# Patient Record
Sex: FEMALE | Race: OTHER | HISPANIC OR LATINO | URBAN - METROPOLITAN AREA
[De-identification: names, ages, dates, MRNs, and addresses within clinical notes are randomized per-mention and may not be internally consistent; named-entity substitution may affect disease eponyms.]

---

## 2017-04-04 ENCOUNTER — INPATIENT (INPATIENT)
Facility: HOSPITAL | Age: 57
LOS: 21 days | Discharge: HOME | End: 2017-04-26
Attending: PSYCHIATRY & NEUROLOGY | Admitting: PSYCHIATRY & NEUROLOGY
Payer: SELF-PAY

## 2017-04-04 VITALS
SYSTOLIC BLOOD PRESSURE: 149 MMHG | DIASTOLIC BLOOD PRESSURE: 104 MMHG | OXYGEN SATURATION: 100 % | HEART RATE: 101 BPM | RESPIRATION RATE: 16 BRPM | TEMPERATURE: 98 F

## 2017-04-04 DIAGNOSIS — F31.4 BIPOLAR DISORDER, CURRENT EPISODE DEPRESSED, SEVERE, WITHOUT PSYCHOTIC FEATURES: ICD-10-CM

## 2017-04-04 LAB
ALBUMIN SERPL ELPH-MCNC: 4.4 G/DL — SIGNIFICANT CHANGE UP (ref 3.3–5)
ALP SERPL-CCNC: 113 U/L — SIGNIFICANT CHANGE UP (ref 40–120)
ALT FLD-CCNC: 51 U/L — HIGH (ref 4–33)
APAP SERPL-MCNC: < 15 UG/ML — LOW (ref 15–25)
AST SERPL-CCNC: 57 U/L — HIGH (ref 4–32)
B-OH-BUTYR SERPL-SCNC: 0.2 MMOL/L — SIGNIFICANT CHANGE UP (ref 0–0.4)
BARBITURATES MEASUREMENT: NEGATIVE — SIGNIFICANT CHANGE UP
BASOPHILS # BLD AUTO: 0.03 K/UL — SIGNIFICANT CHANGE UP (ref 0–0.2)
BASOPHILS NFR BLD AUTO: 0.4 % — SIGNIFICANT CHANGE UP (ref 0–2)
BENZODIAZ SERPL-MCNC: NEGATIVE — SIGNIFICANT CHANGE UP
BILIRUB SERPL-MCNC: 0.7 MG/DL — SIGNIFICANT CHANGE UP (ref 0.2–1.2)
BUN SERPL-MCNC: 12 MG/DL — SIGNIFICANT CHANGE UP (ref 7–23)
CALCIUM SERPL-MCNC: 9.3 MG/DL — SIGNIFICANT CHANGE UP (ref 8.4–10.5)
CHLORIDE SERPL-SCNC: 95 MMOL/L — LOW (ref 98–107)
CO2 SERPL-SCNC: 25 MMOL/L — SIGNIFICANT CHANGE UP (ref 22–31)
CREAT SERPL-MCNC: 0.63 MG/DL — SIGNIFICANT CHANGE UP (ref 0.5–1.3)
EOSINOPHIL # BLD AUTO: 0.07 K/UL — SIGNIFICANT CHANGE UP (ref 0–0.5)
EOSINOPHIL NFR BLD AUTO: 1 % — SIGNIFICANT CHANGE UP (ref 0–6)
ETHANOL BLD-MCNC: < 10 MG/DL — SIGNIFICANT CHANGE UP
GLUCOSE SERPL-MCNC: 164 MG/DL — HIGH (ref 70–99)
HCT VFR BLD CALC: 40.8 % — SIGNIFICANT CHANGE UP (ref 34.5–45)
HGB BLD-MCNC: 13.4 G/DL — SIGNIFICANT CHANGE UP (ref 11.5–15.5)
IMM GRANULOCYTES NFR BLD AUTO: 0.3 % — SIGNIFICANT CHANGE UP (ref 0–1.5)
LYMPHOCYTES # BLD AUTO: 3.07 K/UL — SIGNIFICANT CHANGE UP (ref 1–3.3)
LYMPHOCYTES # BLD AUTO: 44.9 % — HIGH (ref 13–44)
MCHC RBC-ENTMCNC: 29.4 PG — SIGNIFICANT CHANGE UP (ref 27–34)
MCHC RBC-ENTMCNC: 32.8 % — SIGNIFICANT CHANGE UP (ref 32–36)
MCV RBC AUTO: 89.5 FL — SIGNIFICANT CHANGE UP (ref 80–100)
MONOCYTES # BLD AUTO: 0.42 K/UL — SIGNIFICANT CHANGE UP (ref 0–0.9)
MONOCYTES NFR BLD AUTO: 6.1 % — SIGNIFICANT CHANGE UP (ref 2–14)
NEUTROPHILS # BLD AUTO: 3.22 K/UL — SIGNIFICANT CHANGE UP (ref 1.8–7.4)
NEUTROPHILS NFR BLD AUTO: 47.3 % — SIGNIFICANT CHANGE UP (ref 43–77)
PLATELET # BLD AUTO: 381 K/UL — SIGNIFICANT CHANGE UP (ref 150–400)
PMV BLD: 10.2 FL — SIGNIFICANT CHANGE UP (ref 7–13)
POTASSIUM SERPL-MCNC: 3.5 MMOL/L — SIGNIFICANT CHANGE UP (ref 3.5–5.3)
POTASSIUM SERPL-SCNC: 3.5 MMOL/L — SIGNIFICANT CHANGE UP (ref 3.5–5.3)
PROT SERPL-MCNC: 7.3 G/DL — SIGNIFICANT CHANGE UP (ref 6–8.3)
RBC # BLD: 4.56 M/UL — SIGNIFICANT CHANGE UP (ref 3.8–5.2)
RBC # FLD: 13.1 % — SIGNIFICANT CHANGE UP (ref 10.3–14.5)
SALICYLATES SERPL-MCNC: < 5 MG/DL — LOW (ref 15–30)
SODIUM SERPL-SCNC: 140 MMOL/L — SIGNIFICANT CHANGE UP (ref 135–145)
TSH SERPL-MCNC: 1.44 UIU/ML — SIGNIFICANT CHANGE UP (ref 0.27–4.2)
WBC # BLD: 6.83 K/UL — SIGNIFICANT CHANGE UP (ref 3.8–10.5)
WBC # FLD AUTO: 6.83 K/UL — SIGNIFICANT CHANGE UP (ref 3.8–10.5)

## 2017-04-04 PROCEDURE — 99285 EMERGENCY DEPT VISIT HI MDM: CPT

## 2017-04-04 RX ORDER — ARIPIPRAZOLE 15 MG/1
5 TABLET ORAL DAILY
Qty: 0 | Refills: 0 | Status: DISCONTINUED | OUTPATIENT
Start: 2017-04-04 | End: 2017-04-06

## 2017-04-04 RX ORDER — LAMOTRIGINE 25 MG/1
25 TABLET, ORALLY DISINTEGRATING ORAL AT BEDTIME
Qty: 0 | Refills: 0 | Status: DISCONTINUED | OUTPATIENT
Start: 2017-04-04 | End: 2017-04-26

## 2017-04-04 RX ORDER — HALOPERIDOL DECANOATE 100 MG/ML
5 INJECTION INTRAMUSCULAR EVERY 6 HOURS
Qty: 0 | Refills: 0 | Status: DISCONTINUED | OUTPATIENT
Start: 2017-04-04 | End: 2017-04-26

## 2017-04-04 RX ORDER — INSULIN LISPRO 100/ML
VIAL (ML) SUBCUTANEOUS
Qty: 0 | Refills: 0 | Status: DISCONTINUED | OUTPATIENT
Start: 2017-04-04 | End: 2017-04-05

## 2017-04-04 RX ORDER — HALOPERIDOL DECANOATE 100 MG/ML
5 INJECTION INTRAMUSCULAR ONCE
Qty: 0 | Refills: 0 | Status: DISCONTINUED | OUTPATIENT
Start: 2017-04-04 | End: 2017-04-26

## 2017-04-04 RX ORDER — DIPHENHYDRAMINE HCL 50 MG
50 CAPSULE ORAL EVERY 6 HOURS
Qty: 0 | Refills: 0 | Status: DISCONTINUED | OUTPATIENT
Start: 2017-04-04 | End: 2017-04-26

## 2017-04-04 RX ORDER — DIPHENHYDRAMINE HCL 50 MG
50 CAPSULE ORAL ONCE
Qty: 0 | Refills: 0 | Status: DISCONTINUED | OUTPATIENT
Start: 2017-04-04 | End: 2017-04-26

## 2017-04-04 RX ORDER — QUETIAPINE FUMARATE 200 MG/1
25 TABLET, FILM COATED ORAL AT BEDTIME
Qty: 0 | Refills: 0 | Status: DISCONTINUED | OUTPATIENT
Start: 2017-04-04 | End: 2017-04-05

## 2017-04-04 RX ADMIN — Medication 2 MILLIGRAM(S): at 17:05

## 2017-04-04 NOTE — ED BEHAVIORAL HEALTH ASSESSMENT NOTE - DETAILS
about 15 years ago she put a bottle's worth of ambien in her mouth, then thought of her son (6 years old at that time) and spit them out Maternal aunt completed suicide via hanging self. Pt's sister completed suicide by [place] self-referred

## 2017-04-04 NOTE — ED PROVIDER NOTE - CHPI ED SYMPTOMS NEG
no weight loss/no homicidal/no agitation/no confusion/no weakness/no change in level of consciousness/no disorientation/no suicidal/no paranoia/no hallucinations

## 2017-04-04 NOTE — ED PROVIDER NOTE - CARE PLAN
Principal Discharge DX:	Bipolar disorder, current episode depressed, severe, without psychotic features

## 2017-04-04 NOTE — ED ADULT TRIAGE NOTE - CHIEF COMPLAINT QUOTE
Pt c/o increasing depression, but states she will "never act on it." Pt intermittently crying in triage.

## 2017-04-04 NOTE — ED PROVIDER NOTE - MEDICAL DECISION MAKING DETAILS
The patient is a 57y Female hx of bipolar, chronic depression, htn, sleep apnea, type IIDM and noncompliant w/ metformin and Januvia presents to ed for psych eval stating that she is very stressed out because she had an argument w/ her children and would not elaborate.  Pt is well appearing, calm and cooperative w/o visible signs of physical injuries or report of infectious symptoms-  Initial FS was 220mg/dl. Will give pt her missing dose Januvia 100mg tab, metformin dose unknown-   Spoke w/ pt son's Juan Alberto Romeo who confirmed Januvia dosage. Labs unremarkable, blood sugar improved to 162mg/dl, beta hydroxy 0.2- Will clear pt medically for psych disposition-  recs- continue home diabetic meds, FS Q 6 hours with sliding scale while admitted, needs glucometer upon d/c. The patient is a 57y Female hx of bipolar, chronic depression, htn, sleep apnea, type IIDM and noncompliant w/ metformin and Januvia presents to ed for psych eval stating that she is very stressed out because she had an argument w/ her children and would not elaborate.  Pt is well appearing, calm and cooperative w/o visible signs of physical injuries or report of infectious symptoms-  Initial FS was 220mg/dl. Will give pt her missing dose of Januvia 100mg tab, metformin dose unknown-   Spoke w/ pt son's Juan Alberto Romeo who confirmed Januvia dosage. Labs unremarkable, blood sugar improved to 162mg/dl, beta hydroxy 0.2- Will clear pt medically for psych disposition-  recs- continue home diabetic meds, glucophage 1 gram BID- FS Q 6 hours with sliding scale while admitted, needs glucometer upon d/c, awaiting for pt's son to call back with name of antihypertensive meds. The patient is a 57y Female hx of bipolar, chronic depression, htn, sleep apnea, type II DM and noncompliant w/ metformin and Januvia presents to ed for psych eval stating that she is very stressed out because she had an argument w/ her children and would not elaborate.  Pt is well appearing, calm and cooperative w/o visible signs of physical injuries or report of infectious symptoms-  Initial FS was 220mg/dl. Will give pt her missing dose of Januvia 100mg tab, metformin dose unknown-   Spoke w/ pt son's Juan Alberto Roemo who confirmed Januvia dosage. Labs unremarkable, blood sugar improved to 162mg/dl, beta hydroxy 0.2- Will clear pt medically for psych disposition-  recs- continue home diabetic meds, Januvia 100mg tab po QD, FS Q 6 hours with sliding scale while admitted, endocrine f/u in AM, needs glucometer upon d/c, awaiting for pt's son to call back with name of antihypertensive meds.

## 2017-04-04 NOTE — ED BEHAVIORAL HEALTH ASSESSMENT NOTE - DESCRIPTION
labile, agitated, became threatening towards staff DM, HTN, osteoporosis, arthritis, genital herpes, sleep apnea domiciled with adult children, previously working at [employer], was fired because unable to function

## 2017-04-04 NOTE — ED BEHAVIORAL HEALTH ASSESSMENT NOTE - RISK ASSESSMENT
high risk at this time given mood episode, lability, sig fam hx of suicide, poor adherence to medication

## 2017-04-04 NOTE — ED BEHAVIORAL HEALTH ASSESSMENT NOTE - PSYCHIATRIC ISSUES AND PLAN (INCLUDE STANDING AND PRN MEDICATION)
resume psychotropics at starting doses given poor adherence-> lamictal 25 mg PO QHS, Abilify 5 mg PO qdaily, seroquel for sleep (reports trazodone no longer helping, hx of OD attempt on ambien) resume psychotropics at starting doses given poor adherence-> lamictal 25 mg PO QHS, Abilify 5 mg PO qdaily.  Start seroquel for sleep for now, recommend d/c once more stable (reports trazodone no longer helping, hx of OD attempt on ambien)

## 2017-04-04 NOTE — ED PROVIDER NOTE - OBJECTIVE STATEMENT
The patient is a 57y Female hx of chronic depression, OA, htn, DM noncompliant w/ meds- Januvia and Glucophage presents to ED for psych eval stating that she is stressed out because she is not getting along with her children.  Pt denies all medical complaints at present, no recent injuries, trauma, falls or physical abuse.  Denies headache, back or neck pain, no abd/flank pain, LUTS, nausea or vomiting, no fever or chills, no cp or sob, no palpitations or diaphoresis.  Denies etoh or illicit drugs, no SI/HI, no AVH.  Endorsed no other symptoms.

## 2017-04-04 NOTE — ED BEHAVIORAL HEALTH ASSESSMENT NOTE - SUMMARY
56 yo  F, domiciled with adult son + adult daughter, hx of bipolar disorder, fired from job about 1 month ago, reported hx of about 20 inpatient psychiatric hospitalizations (last April 2016), no known hx of violence/substance abuse, 1 prior self-aborted suicide attempt, significant fam hx of complete suicide (sister jumped in front of train, maternal aunt hanged self), called 911 today herself c/o severely depressed mood x about 1 week.  Upon evaluation-> depressive sxs, very labile affect, persecutory ideation, became agitated, threatening while in the ED.   Required Ativan 2 mg IM.  Pt has not been able to function or care for self.  Has been poorly adherent to psychotropics.  Requires inpatient psychiatric hospitalization for safety and stabilization.

## 2017-04-04 NOTE — ED BEHAVIORAL HEALTH ASSESSMENT NOTE - HPI (INCLUDE ILLNESS QUALITY, SEVERITY, DURATION, TIMING, CONTEXT, MODIFYING FACTORS, ASSOCIATED SIGNS AND SYMPTOMS)
56 yo  F, domiciled with adult son + adult daughter, hx of bipolar disorder, fired from job about 1 month ago, reported hx of about 20 inpatient psychiatric hospitalizations (last April 2016), no known hx of violence/substance abuse, 1 prior self-aborted suicide attempt, significant fam hx of suicide (see below), called 911 today herself c/o severely depressed mood.  Pt reports that since returning from the [place] about 1 week ago she has felt very sad/depressed, poor energy/concentration/sleep, difficulty functioning.  Reports normal appetite.  She is very labile on interview.  Reports feeling that her family members are "giving her a hard time."  Reports poor adherence to her medication for last several months.  Last saw her psychiatrist 3 days ago, at [facility], she is unable to recall his name.  She denies suicidal/homicidal ideation/intent/plan.    Spoke with son Juan Alberto who reports patient has appeared emotional unstable, very tearful x about 4 days.  He reports this is how she appears when severely depressed.  She is able to recognize when she is overwhelmed and reaches her limit and calls 911 herself when she "can not take it anymore."  He denies that she is suicidal/physically aggressive or homicidal, but states that she knows when she is not doing well and contacts 911 to "avoid doing something stupid." She last called 911 one year ago and was psychiatrically hospitalized at that time. He confirms she has been poorly adherent to her medications.  He is concerned about her and how her behavior affects his 8 year old nephew and believes she would benefit from inpatient psychiatric hospitalization.  Per Juan Alberto when pt is having a mood episode she begins to feel that everyone is against her/treating her badly.  No recent psychosis, but has a hx of delusions while depressed in the past.

## 2017-04-04 NOTE — ED BEHAVIORAL HEALTH ASSESSMENT NOTE - MEDICAL ISSUES AND PLAN (INCLUDE STANDING AND PRN MEDICATION)
c/w medical medications per EM NP, for DM restart Januvia 100 mg PO Qdaily + sliding scale + FS Q6hrs, and recommend endocrinology follow-up in AM

## 2017-04-05 PROCEDURE — 99222 1ST HOSP IP/OBS MODERATE 55: CPT | Mod: GC

## 2017-04-05 PROCEDURE — 90853 GROUP PSYCHOTHERAPY: CPT

## 2017-04-05 RX ORDER — AMLODIPINE BESYLATE 2.5 MG/1
10 TABLET ORAL ONCE
Qty: 0 | Refills: 0 | Status: COMPLETED | OUTPATIENT
Start: 2017-04-05 | End: 2017-04-05

## 2017-04-05 RX ORDER — CARVEDILOL PHOSPHATE 80 MG/1
6.25 CAPSULE, EXTENDED RELEASE ORAL EVERY 12 HOURS
Qty: 0 | Refills: 0 | Status: DISCONTINUED | OUTPATIENT
Start: 2017-04-05 | End: 2017-04-07

## 2017-04-05 RX ORDER — DIPHENHYDRAMINE HCL 50 MG
50 CAPSULE ORAL EVERY 6 HOURS
Qty: 0 | Refills: 0 | Status: DISCONTINUED | OUTPATIENT
Start: 2017-04-05 | End: 2017-04-26

## 2017-04-05 RX ORDER — PANTOPRAZOLE SODIUM 20 MG/1
40 TABLET, DELAYED RELEASE ORAL
Qty: 0 | Refills: 0 | Status: DISCONTINUED | OUTPATIENT
Start: 2017-04-05 | End: 2017-04-26

## 2017-04-05 RX ORDER — CARVEDILOL PHOSPHATE 80 MG/1
6.25 CAPSULE, EXTENDED RELEASE ORAL ONCE
Qty: 0 | Refills: 0 | Status: COMPLETED | OUTPATIENT
Start: 2017-04-05 | End: 2017-04-05

## 2017-04-05 RX ORDER — PANTOPRAZOLE SODIUM 20 MG/1
40 TABLET, DELAYED RELEASE ORAL ONCE
Qty: 0 | Refills: 0 | Status: COMPLETED | OUTPATIENT
Start: 2017-04-05 | End: 2017-04-05

## 2017-04-05 RX ORDER — CARVEDILOL PHOSPHATE 80 MG/1
6.25 CAPSULE, EXTENDED RELEASE ORAL EVERY 12 HOURS
Qty: 0 | Refills: 0 | Status: DISCONTINUED | OUTPATIENT
Start: 2017-04-05 | End: 2017-04-05

## 2017-04-05 RX ORDER — ATORVASTATIN CALCIUM 80 MG/1
20 TABLET, FILM COATED ORAL AT BEDTIME
Qty: 0 | Refills: 0 | Status: DISCONTINUED | OUTPATIENT
Start: 2017-04-05 | End: 2017-04-26

## 2017-04-05 RX ORDER — QUETIAPINE FUMARATE 200 MG/1
25 TABLET, FILM COATED ORAL AT BEDTIME
Qty: 0 | Refills: 0 | Status: DISCONTINUED | OUTPATIENT
Start: 2017-04-05 | End: 2017-04-26

## 2017-04-05 RX ORDER — VALSARTAN 80 MG/1
320 TABLET ORAL DAILY
Qty: 0 | Refills: 0 | Status: DISCONTINUED | OUTPATIENT
Start: 2017-04-05 | End: 2017-04-26

## 2017-04-05 RX ORDER — IBUPROFEN 200 MG
600 TABLET ORAL EVERY 6 HOURS
Qty: 0 | Refills: 0 | Status: DISCONTINUED | OUTPATIENT
Start: 2017-04-05 | End: 2017-04-26

## 2017-04-05 RX ORDER — METFORMIN HYDROCHLORIDE 850 MG/1
1000 TABLET ORAL
Qty: 0 | Refills: 0 | Status: DISCONTINUED | OUTPATIENT
Start: 2017-04-05 | End: 2017-04-26

## 2017-04-05 RX ORDER — AMLODIPINE BESYLATE 2.5 MG/1
10 TABLET ORAL DAILY
Qty: 0 | Refills: 0 | Status: DISCONTINUED | OUTPATIENT
Start: 2017-04-05 | End: 2017-04-24

## 2017-04-05 RX ORDER — INSULIN LISPRO 100/ML
VIAL (ML) SUBCUTANEOUS
Qty: 0 | Refills: 0 | Status: DISCONTINUED | OUTPATIENT
Start: 2017-04-05 | End: 2017-04-26

## 2017-04-05 RX ORDER — INSULIN LISPRO 100/ML
VIAL (ML) SUBCUTANEOUS AT BEDTIME
Qty: 0 | Refills: 0 | Status: DISCONTINUED | OUTPATIENT
Start: 2017-04-05 | End: 2017-04-26

## 2017-04-05 RX ORDER — AMLODIPINE BESYLATE 2.5 MG/1
10 TABLET ORAL DAILY
Qty: 0 | Refills: 0 | Status: DISCONTINUED | OUTPATIENT
Start: 2017-04-05 | End: 2017-04-05

## 2017-04-05 RX ADMIN — ATORVASTATIN CALCIUM 20 MILLIGRAM(S): 80 TABLET, FILM COATED ORAL at 20:44

## 2017-04-05 RX ADMIN — Medication: at 16:48

## 2017-04-05 RX ADMIN — Medication: at 21:11

## 2017-04-05 RX ADMIN — Medication: at 12:12

## 2017-04-05 RX ADMIN — METFORMIN HYDROCHLORIDE 1000 MILLIGRAM(S): 850 TABLET ORAL at 16:48

## 2017-04-05 RX ADMIN — QUETIAPINE FUMARATE 25 MILLIGRAM(S): 200 TABLET, FILM COATED ORAL at 00:08

## 2017-04-05 RX ADMIN — ARIPIPRAZOLE 5 MILLIGRAM(S): 15 TABLET ORAL at 09:10

## 2017-04-05 RX ADMIN — LAMOTRIGINE 25 MILLIGRAM(S): 25 TABLET, ORALLY DISINTEGRATING ORAL at 20:44

## 2017-04-05 RX ADMIN — Medication 30 MILLILITER(S): at 21:11

## 2017-04-05 RX ADMIN — Medication 30 MILLILITER(S): at 00:10

## 2017-04-05 RX ADMIN — Medication 600 MILLIGRAM(S): at 10:48

## 2017-04-05 RX ADMIN — Medication 1 TABLET(S): at 20:44

## 2017-04-05 RX ADMIN — CARVEDILOL PHOSPHATE 6.25 MILLIGRAM(S): 80 CAPSULE, EXTENDED RELEASE ORAL at 20:44

## 2017-04-05 RX ADMIN — PANTOPRAZOLE SODIUM 40 MILLIGRAM(S): 20 TABLET, DELAYED RELEASE ORAL at 03:29

## 2017-04-05 RX ADMIN — QUETIAPINE FUMARATE 25 MILLIGRAM(S): 200 TABLET, FILM COATED ORAL at 21:48

## 2017-04-05 RX ADMIN — Medication 1 MILLIGRAM(S): at 22:41

## 2017-04-05 RX ADMIN — Medication 600 MILLIGRAM(S): at 12:13

## 2017-04-05 RX ADMIN — AMLODIPINE BESYLATE 10 MILLIGRAM(S): 2.5 TABLET ORAL at 12:12

## 2017-04-05 RX ADMIN — CARVEDILOL PHOSPHATE 6.25 MILLIGRAM(S): 80 CAPSULE, EXTENDED RELEASE ORAL at 12:12

## 2017-04-06 LAB
CHOLEST SERPL-MCNC: 229 MG/DL — HIGH (ref 120–199)
HBA1C BLD-MCNC: 9.5 % — HIGH (ref 4–5.6)
HDLC SERPL-MCNC: 65 MG/DL — SIGNIFICANT CHANGE UP (ref 45–65)
LIPID PNL WITH DIRECT LDL SERPL: 142 MG/DL — SIGNIFICANT CHANGE UP
TRIGL SERPL-MCNC: 157 MG/DL — HIGH (ref 10–149)

## 2017-04-06 PROCEDURE — 99232 SBSQ HOSP IP/OBS MODERATE 35: CPT | Mod: 25

## 2017-04-06 PROCEDURE — 90853 GROUP PSYCHOTHERAPY: CPT

## 2017-04-06 RX ORDER — ARIPIPRAZOLE 15 MG/1
10 TABLET ORAL DAILY
Qty: 0 | Refills: 0 | Status: DISCONTINUED | OUTPATIENT
Start: 2017-04-07 | End: 2017-04-10

## 2017-04-06 RX ADMIN — ATORVASTATIN CALCIUM 20 MILLIGRAM(S): 80 TABLET, FILM COATED ORAL at 21:14

## 2017-04-06 RX ADMIN — AMLODIPINE BESYLATE 10 MILLIGRAM(S): 2.5 TABLET ORAL at 09:09

## 2017-04-06 RX ADMIN — METFORMIN HYDROCHLORIDE 1000 MILLIGRAM(S): 850 TABLET ORAL at 17:10

## 2017-04-06 RX ADMIN — CARVEDILOL PHOSPHATE 6.25 MILLIGRAM(S): 80 CAPSULE, EXTENDED RELEASE ORAL at 09:08

## 2017-04-06 RX ADMIN — PANTOPRAZOLE SODIUM 40 MILLIGRAM(S): 20 TABLET, DELAYED RELEASE ORAL at 07:15

## 2017-04-06 RX ADMIN — VALSARTAN 320 MILLIGRAM(S): 80 TABLET ORAL at 09:08

## 2017-04-06 RX ADMIN — QUETIAPINE FUMARATE 25 MILLIGRAM(S): 200 TABLET, FILM COATED ORAL at 22:35

## 2017-04-06 RX ADMIN — ARIPIPRAZOLE 5 MILLIGRAM(S): 15 TABLET ORAL at 09:08

## 2017-04-06 RX ADMIN — LAMOTRIGINE 25 MILLIGRAM(S): 25 TABLET, ORALLY DISINTEGRATING ORAL at 21:15

## 2017-04-06 RX ADMIN — Medication: at 17:11

## 2017-04-06 RX ADMIN — Medication: at 12:20

## 2017-04-06 RX ADMIN — METFORMIN HYDROCHLORIDE 1000 MILLIGRAM(S): 850 TABLET ORAL at 09:08

## 2017-04-06 RX ADMIN — Medication 1 TABLET(S): at 09:08

## 2017-04-06 RX ADMIN — Medication 1 TABLET(S): at 21:15

## 2017-04-06 RX ADMIN — CARVEDILOL PHOSPHATE 6.25 MILLIGRAM(S): 80 CAPSULE, EXTENDED RELEASE ORAL at 21:15

## 2017-04-06 RX ADMIN — Medication: at 08:59

## 2017-04-06 RX ADMIN — Medication 30 MILLILITER(S): at 17:50

## 2017-04-07 PROCEDURE — 99223 1ST HOSP IP/OBS HIGH 75: CPT

## 2017-04-07 PROCEDURE — 90853 GROUP PSYCHOTHERAPY: CPT

## 2017-04-07 PROCEDURE — 99232 SBSQ HOSP IP/OBS MODERATE 35: CPT | Mod: 25

## 2017-04-07 PROCEDURE — 93010 ELECTROCARDIOGRAM REPORT: CPT

## 2017-04-07 RX ORDER — GLIMEPIRIDE 1 MG
1 TABLET ORAL
Qty: 0 | Refills: 0 | Status: DISCONTINUED | OUTPATIENT
Start: 2017-04-07 | End: 2017-04-10

## 2017-04-07 RX ORDER — ACYCLOVIR SODIUM 500 MG
400 VIAL (EA) INTRAVENOUS
Qty: 0 | Refills: 0 | Status: DISCONTINUED | OUTPATIENT
Start: 2017-04-07 | End: 2017-04-26

## 2017-04-07 RX ORDER — CARVEDILOL PHOSPHATE 80 MG/1
12.5 CAPSULE, EXTENDED RELEASE ORAL EVERY 12 HOURS
Qty: 0 | Refills: 0 | Status: DISCONTINUED | OUTPATIENT
Start: 2017-04-07 | End: 2017-04-24

## 2017-04-07 RX ORDER — HYDROXYZINE HCL 10 MG
50 TABLET ORAL EVERY 6 HOURS
Qty: 0 | Refills: 0 | Status: DISCONTINUED | OUTPATIENT
Start: 2017-04-07 | End: 2017-04-26

## 2017-04-07 RX ADMIN — ATORVASTATIN CALCIUM 20 MILLIGRAM(S): 80 TABLET, FILM COATED ORAL at 20:57

## 2017-04-07 RX ADMIN — Medication 600 MILLIGRAM(S): at 10:14

## 2017-04-07 RX ADMIN — Medication 600 MILLIGRAM(S): at 20:54

## 2017-04-07 RX ADMIN — Medication 400 MILLIGRAM(S): at 20:57

## 2017-04-07 RX ADMIN — METFORMIN HYDROCHLORIDE 1000 MILLIGRAM(S): 850 TABLET ORAL at 16:37

## 2017-04-07 RX ADMIN — Medication 50 MILLIGRAM(S): at 20:52

## 2017-04-07 RX ADMIN — Medication 1 TABLET(S): at 08:54

## 2017-04-07 RX ADMIN — Medication 600 MILLIGRAM(S): at 09:14

## 2017-04-07 RX ADMIN — VALSARTAN 320 MILLIGRAM(S): 80 TABLET ORAL at 08:54

## 2017-04-07 RX ADMIN — Medication 1 TABLET(S): at 20:57

## 2017-04-07 RX ADMIN — CARVEDILOL PHOSPHATE 12.5 MILLIGRAM(S): 80 CAPSULE, EXTENDED RELEASE ORAL at 20:57

## 2017-04-07 RX ADMIN — Medication 50 MILLIGRAM(S): at 20:54

## 2017-04-07 RX ADMIN — Medication: at 16:37

## 2017-04-07 RX ADMIN — Medication: at 08:56

## 2017-04-07 RX ADMIN — CARVEDILOL PHOSPHATE 6.25 MILLIGRAM(S): 80 CAPSULE, EXTENDED RELEASE ORAL at 08:54

## 2017-04-07 RX ADMIN — METFORMIN HYDROCHLORIDE 1000 MILLIGRAM(S): 850 TABLET ORAL at 09:14

## 2017-04-07 RX ADMIN — Medication 600 MILLIGRAM(S): at 22:04

## 2017-04-07 RX ADMIN — Medication: at 12:30

## 2017-04-07 RX ADMIN — LAMOTRIGINE 25 MILLIGRAM(S): 25 TABLET, ORALLY DISINTEGRATING ORAL at 20:57

## 2017-04-07 RX ADMIN — PANTOPRAZOLE SODIUM 40 MILLIGRAM(S): 20 TABLET, DELAYED RELEASE ORAL at 08:54

## 2017-04-07 RX ADMIN — AMLODIPINE BESYLATE 10 MILLIGRAM(S): 2.5 TABLET ORAL at 08:54

## 2017-04-07 RX ADMIN — ARIPIPRAZOLE 10 MILLIGRAM(S): 15 TABLET ORAL at 08:54

## 2017-04-07 RX ADMIN — Medication 30 MILLILITER(S): at 18:00

## 2017-04-08 PROCEDURE — 99232 SBSQ HOSP IP/OBS MODERATE 35: CPT

## 2017-04-08 RX ORDER — DOCUSATE SODIUM 100 MG
100 CAPSULE ORAL
Qty: 0 | Refills: 0 | Status: DISCONTINUED | OUTPATIENT
Start: 2017-04-08 | End: 2017-04-26

## 2017-04-08 RX ORDER — SENNA PLUS 8.6 MG/1
2 TABLET ORAL AT BEDTIME
Qty: 0 | Refills: 0 | Status: DISCONTINUED | OUTPATIENT
Start: 2017-04-08 | End: 2017-04-26

## 2017-04-08 RX ADMIN — METFORMIN HYDROCHLORIDE 1000 MILLIGRAM(S): 850 TABLET ORAL at 16:26

## 2017-04-08 RX ADMIN — METFORMIN HYDROCHLORIDE 1000 MILLIGRAM(S): 850 TABLET ORAL at 08:49

## 2017-04-08 RX ADMIN — Medication 600 MILLIGRAM(S): at 09:43

## 2017-04-08 RX ADMIN — Medication 50 MILLIGRAM(S): at 22:28

## 2017-04-08 RX ADMIN — Medication 30 MILLILITER(S): at 17:40

## 2017-04-08 RX ADMIN — CARVEDILOL PHOSPHATE 12.5 MILLIGRAM(S): 80 CAPSULE, EXTENDED RELEASE ORAL at 21:35

## 2017-04-08 RX ADMIN — ARIPIPRAZOLE 10 MILLIGRAM(S): 15 TABLET ORAL at 08:48

## 2017-04-08 RX ADMIN — CARVEDILOL PHOSPHATE 12.5 MILLIGRAM(S): 80 CAPSULE, EXTENDED RELEASE ORAL at 08:48

## 2017-04-08 RX ADMIN — Medication 400 MILLIGRAM(S): at 21:35

## 2017-04-08 RX ADMIN — Medication 50 MILLIGRAM(S): at 17:15

## 2017-04-08 RX ADMIN — AMLODIPINE BESYLATE 10 MILLIGRAM(S): 2.5 TABLET ORAL at 08:48

## 2017-04-08 RX ADMIN — Medication 400 MILLIGRAM(S): at 08:48

## 2017-04-08 RX ADMIN — Medication 100 MILLIGRAM(S): at 22:28

## 2017-04-08 RX ADMIN — Medication: at 08:45

## 2017-04-08 RX ADMIN — Medication 30 MILLILITER(S): at 10:00

## 2017-04-08 RX ADMIN — Medication 1 TABLET(S): at 21:35

## 2017-04-08 RX ADMIN — Medication 1 MILLIGRAM(S): at 08:49

## 2017-04-08 RX ADMIN — LAMOTRIGINE 25 MILLIGRAM(S): 25 TABLET, ORALLY DISINTEGRATING ORAL at 21:35

## 2017-04-08 RX ADMIN — VALSARTAN 320 MILLIGRAM(S): 80 TABLET ORAL at 08:49

## 2017-04-08 RX ADMIN — Medication 50 MILLIGRAM(S): at 10:22

## 2017-04-08 RX ADMIN — Medication: at 16:25

## 2017-04-08 RX ADMIN — ATORVASTATIN CALCIUM 20 MILLIGRAM(S): 80 TABLET, FILM COATED ORAL at 21:35

## 2017-04-08 RX ADMIN — Medication 1 TABLET(S): at 08:48

## 2017-04-08 RX ADMIN — QUETIAPINE FUMARATE 25 MILLIGRAM(S): 200 TABLET, FILM COATED ORAL at 00:00

## 2017-04-08 RX ADMIN — PANTOPRAZOLE SODIUM 40 MILLIGRAM(S): 20 TABLET, DELAYED RELEASE ORAL at 08:49

## 2017-04-09 PROCEDURE — 99232 SBSQ HOSP IP/OBS MODERATE 35: CPT

## 2017-04-09 RX ADMIN — AMLODIPINE BESYLATE 10 MILLIGRAM(S): 2.5 TABLET ORAL at 08:58

## 2017-04-09 RX ADMIN — Medication 1 TABLET(S): at 21:20

## 2017-04-09 RX ADMIN — ARIPIPRAZOLE 10 MILLIGRAM(S): 15 TABLET ORAL at 08:58

## 2017-04-09 RX ADMIN — QUETIAPINE FUMARATE 25 MILLIGRAM(S): 200 TABLET, FILM COATED ORAL at 00:32

## 2017-04-09 RX ADMIN — Medication 1 TABLET(S): at 08:58

## 2017-04-09 RX ADMIN — Medication 600 MILLIGRAM(S): at 22:20

## 2017-04-09 RX ADMIN — Medication: at 16:57

## 2017-04-09 RX ADMIN — Medication 400 MILLIGRAM(S): at 08:58

## 2017-04-09 RX ADMIN — Medication 1 MILLIGRAM(S): at 08:58

## 2017-04-09 RX ADMIN — ATORVASTATIN CALCIUM 20 MILLIGRAM(S): 80 TABLET, FILM COATED ORAL at 21:20

## 2017-04-09 RX ADMIN — CARVEDILOL PHOSPHATE 12.5 MILLIGRAM(S): 80 CAPSULE, EXTENDED RELEASE ORAL at 08:58

## 2017-04-09 RX ADMIN — METFORMIN HYDROCHLORIDE 1000 MILLIGRAM(S): 850 TABLET ORAL at 08:58

## 2017-04-09 RX ADMIN — Medication: at 08:09

## 2017-04-09 RX ADMIN — Medication 50 MILLIGRAM(S): at 10:40

## 2017-04-09 RX ADMIN — PANTOPRAZOLE SODIUM 40 MILLIGRAM(S): 20 TABLET, DELAYED RELEASE ORAL at 08:58

## 2017-04-09 RX ADMIN — VALSARTAN 320 MILLIGRAM(S): 80 TABLET ORAL at 08:58

## 2017-04-09 RX ADMIN — QUETIAPINE FUMARATE 25 MILLIGRAM(S): 200 TABLET, FILM COATED ORAL at 23:20

## 2017-04-09 RX ADMIN — METFORMIN HYDROCHLORIDE 1000 MILLIGRAM(S): 850 TABLET ORAL at 16:57

## 2017-04-09 RX ADMIN — Medication 600 MILLIGRAM(S): at 01:00

## 2017-04-09 RX ADMIN — Medication 600 MILLIGRAM(S): at 00:05

## 2017-04-09 RX ADMIN — LAMOTRIGINE 25 MILLIGRAM(S): 25 TABLET, ORALLY DISINTEGRATING ORAL at 21:20

## 2017-04-09 RX ADMIN — Medication 400 MILLIGRAM(S): at 21:20

## 2017-04-09 RX ADMIN — Medication: at 11:37

## 2017-04-09 RX ADMIN — CARVEDILOL PHOSPHATE 12.5 MILLIGRAM(S): 80 CAPSULE, EXTENDED RELEASE ORAL at 21:20

## 2017-04-09 RX ADMIN — Medication 600 MILLIGRAM(S): at 23:21

## 2017-04-09 RX ADMIN — Medication 600 MILLIGRAM(S): at 10:30

## 2017-04-10 LAB — T PALLIDUM AB TITR SER: NEGATIVE — SIGNIFICANT CHANGE UP

## 2017-04-10 PROCEDURE — 99232 SBSQ HOSP IP/OBS MODERATE 35: CPT

## 2017-04-10 PROCEDURE — 99232 SBSQ HOSP IP/OBS MODERATE 35: CPT | Mod: 25

## 2017-04-10 RX ORDER — QUETIAPINE FUMARATE 200 MG/1
50 TABLET, FILM COATED ORAL AT BEDTIME
Qty: 0 | Refills: 0 | Status: DISCONTINUED | OUTPATIENT
Start: 2017-04-10 | End: 2017-04-13

## 2017-04-10 RX ORDER — GLIMEPIRIDE 1 MG
2 TABLET ORAL
Qty: 0 | Refills: 0 | Status: DISCONTINUED | OUTPATIENT
Start: 2017-04-10 | End: 2017-04-12

## 2017-04-10 RX ORDER — ARIPIPRAZOLE 15 MG/1
15 TABLET ORAL DAILY
Qty: 0 | Refills: 0 | Status: DISCONTINUED | OUTPATIENT
Start: 2017-04-11 | End: 2017-04-11

## 2017-04-10 RX ADMIN — AMLODIPINE BESYLATE 10 MILLIGRAM(S): 2.5 TABLET ORAL at 09:00

## 2017-04-10 RX ADMIN — METFORMIN HYDROCHLORIDE 1000 MILLIGRAM(S): 850 TABLET ORAL at 17:00

## 2017-04-10 RX ADMIN — Medication 50 MILLIGRAM(S): at 22:46

## 2017-04-10 RX ADMIN — ATORVASTATIN CALCIUM 20 MILLIGRAM(S): 80 TABLET, FILM COATED ORAL at 22:32

## 2017-04-10 RX ADMIN — Medication 1 TABLET(S): at 08:56

## 2017-04-10 RX ADMIN — Medication 400 MILLIGRAM(S): at 08:58

## 2017-04-10 RX ADMIN — LAMOTRIGINE 25 MILLIGRAM(S): 25 TABLET, ORALLY DISINTEGRATING ORAL at 22:33

## 2017-04-10 RX ADMIN — Medication: at 12:35

## 2017-04-10 RX ADMIN — METFORMIN HYDROCHLORIDE 1000 MILLIGRAM(S): 850 TABLET ORAL at 08:54

## 2017-04-10 RX ADMIN — Medication 1 MILLIGRAM(S): at 09:00

## 2017-04-10 RX ADMIN — Medication 400 MILLIGRAM(S): at 22:32

## 2017-04-10 RX ADMIN — QUETIAPINE FUMARATE 50 MILLIGRAM(S): 200 TABLET, FILM COATED ORAL at 22:33

## 2017-04-10 RX ADMIN — ARIPIPRAZOLE 10 MILLIGRAM(S): 15 TABLET ORAL at 09:00

## 2017-04-10 RX ADMIN — PANTOPRAZOLE SODIUM 40 MILLIGRAM(S): 20 TABLET, DELAYED RELEASE ORAL at 08:56

## 2017-04-10 RX ADMIN — CARVEDILOL PHOSPHATE 12.5 MILLIGRAM(S): 80 CAPSULE, EXTENDED RELEASE ORAL at 22:32

## 2017-04-10 RX ADMIN — CARVEDILOL PHOSPHATE 12.5 MILLIGRAM(S): 80 CAPSULE, EXTENDED RELEASE ORAL at 08:59

## 2017-04-10 RX ADMIN — Medication: at 08:11

## 2017-04-10 RX ADMIN — Medication 1 TABLET(S): at 22:32

## 2017-04-10 RX ADMIN — Medication: at 16:59

## 2017-04-10 RX ADMIN — VALSARTAN 320 MILLIGRAM(S): 80 TABLET ORAL at 08:54

## 2017-04-11 PROCEDURE — 99232 SBSQ HOSP IP/OBS MODERATE 35: CPT

## 2017-04-11 RX ORDER — ALBUTEROL 90 UG/1
1 AEROSOL, METERED ORAL EVERY 6 HOURS
Qty: 0 | Refills: 0 | Status: DISCONTINUED | OUTPATIENT
Start: 2017-04-11 | End: 2017-04-26

## 2017-04-11 RX ORDER — DIVALPROEX SODIUM 500 MG/1
500 TABLET, DELAYED RELEASE ORAL AT BEDTIME
Qty: 0 | Refills: 0 | Status: DISCONTINUED | OUTPATIENT
Start: 2017-04-11 | End: 2017-04-13

## 2017-04-11 RX ORDER — ARIPIPRAZOLE 15 MG/1
20 TABLET ORAL DAILY
Qty: 0 | Refills: 0 | Status: DISCONTINUED | OUTPATIENT
Start: 2017-04-12 | End: 2017-04-12

## 2017-04-11 RX ADMIN — ATORVASTATIN CALCIUM 20 MILLIGRAM(S): 80 TABLET, FILM COATED ORAL at 21:55

## 2017-04-11 RX ADMIN — METFORMIN HYDROCHLORIDE 1000 MILLIGRAM(S): 850 TABLET ORAL at 09:35

## 2017-04-11 RX ADMIN — CARVEDILOL PHOSPHATE 12.5 MILLIGRAM(S): 80 CAPSULE, EXTENDED RELEASE ORAL at 09:34

## 2017-04-11 RX ADMIN — Medication 600 MILLIGRAM(S): at 05:10

## 2017-04-11 RX ADMIN — Medication 50 MILLIGRAM(S): at 21:45

## 2017-04-11 RX ADMIN — QUETIAPINE FUMARATE 50 MILLIGRAM(S): 200 TABLET, FILM COATED ORAL at 21:55

## 2017-04-11 RX ADMIN — Medication 600 MILLIGRAM(S): at 09:39

## 2017-04-11 RX ADMIN — DIVALPROEX SODIUM 500 MILLIGRAM(S): 500 TABLET, DELAYED RELEASE ORAL at 21:55

## 2017-04-11 RX ADMIN — PANTOPRAZOLE SODIUM 40 MILLIGRAM(S): 20 TABLET, DELAYED RELEASE ORAL at 09:35

## 2017-04-11 RX ADMIN — AMLODIPINE BESYLATE 10 MILLIGRAM(S): 2.5 TABLET ORAL at 09:33

## 2017-04-11 RX ADMIN — Medication 1 TABLET(S): at 09:34

## 2017-04-11 RX ADMIN — Medication 600 MILLIGRAM(S): at 15:36

## 2017-04-11 RX ADMIN — LAMOTRIGINE 25 MILLIGRAM(S): 25 TABLET, ORALLY DISINTEGRATING ORAL at 21:55

## 2017-04-11 RX ADMIN — Medication: at 09:35

## 2017-04-11 RX ADMIN — METFORMIN HYDROCHLORIDE 1000 MILLIGRAM(S): 850 TABLET ORAL at 16:54

## 2017-04-11 RX ADMIN — CARVEDILOL PHOSPHATE 12.5 MILLIGRAM(S): 80 CAPSULE, EXTENDED RELEASE ORAL at 21:55

## 2017-04-11 RX ADMIN — Medication 50 MILLIGRAM(S): at 15:16

## 2017-04-11 RX ADMIN — Medication 1 TABLET(S): at 21:55

## 2017-04-11 RX ADMIN — VALSARTAN 320 MILLIGRAM(S): 80 TABLET ORAL at 09:36

## 2017-04-11 RX ADMIN — QUETIAPINE FUMARATE 25 MILLIGRAM(S): 200 TABLET, FILM COATED ORAL at 22:07

## 2017-04-11 RX ADMIN — Medication: at 16:55

## 2017-04-11 RX ADMIN — Medication 400 MILLIGRAM(S): at 21:55

## 2017-04-11 RX ADMIN — Medication 2 MILLIGRAM(S): at 09:35

## 2017-04-11 RX ADMIN — Medication 600 MILLIGRAM(S): at 15:38

## 2017-04-11 RX ADMIN — Medication 400 MILLIGRAM(S): at 09:32

## 2017-04-11 RX ADMIN — Medication 600 MILLIGRAM(S): at 22:07

## 2017-04-11 RX ADMIN — ARIPIPRAZOLE 15 MILLIGRAM(S): 15 TABLET ORAL at 09:33

## 2017-04-12 PROCEDURE — 99232 SBSQ HOSP IP/OBS MODERATE 35: CPT

## 2017-04-12 RX ORDER — ARIPIPRAZOLE 15 MG/1
25 TABLET ORAL DAILY
Qty: 0 | Refills: 0 | Status: DISCONTINUED | OUTPATIENT
Start: 2017-04-13 | End: 2017-04-17

## 2017-04-12 RX ORDER — PHENYLEPHRINE-SHARK LIVER OIL-MINERAL OIL-PETROLATUM RECTAL OINTMENT
1 OINTMENT (GRAM) RECTAL
Qty: 0 | Refills: 0 | Status: DISCONTINUED | OUTPATIENT
Start: 2017-04-12 | End: 2017-04-26

## 2017-04-12 RX ORDER — GLIMEPIRIDE 1 MG
4 TABLET ORAL
Qty: 0 | Refills: 0 | Status: DISCONTINUED | OUTPATIENT
Start: 2017-04-13 | End: 2017-04-26

## 2017-04-12 RX ADMIN — Medication 400 MILLIGRAM(S): at 09:35

## 2017-04-12 RX ADMIN — METFORMIN HYDROCHLORIDE 1000 MILLIGRAM(S): 850 TABLET ORAL at 16:53

## 2017-04-12 RX ADMIN — ATORVASTATIN CALCIUM 20 MILLIGRAM(S): 80 TABLET, FILM COATED ORAL at 20:52

## 2017-04-12 RX ADMIN — CARVEDILOL PHOSPHATE 12.5 MILLIGRAM(S): 80 CAPSULE, EXTENDED RELEASE ORAL at 20:53

## 2017-04-12 RX ADMIN — CARVEDILOL PHOSPHATE 12.5 MILLIGRAM(S): 80 CAPSULE, EXTENDED RELEASE ORAL at 09:35

## 2017-04-12 RX ADMIN — AMLODIPINE BESYLATE 10 MILLIGRAM(S): 2.5 TABLET ORAL at 09:35

## 2017-04-12 RX ADMIN — Medication 600 MILLIGRAM(S): at 17:17

## 2017-04-12 RX ADMIN — Medication: at 09:07

## 2017-04-12 RX ADMIN — Medication: at 13:06

## 2017-04-12 RX ADMIN — Medication 2 MILLIGRAM(S): at 09:35

## 2017-04-12 RX ADMIN — Medication 600 MILLIGRAM(S): at 23:52

## 2017-04-12 RX ADMIN — Medication 600 MILLIGRAM(S): at 10:12

## 2017-04-12 RX ADMIN — ALBUTEROL 1 PUFF(S): 90 AEROSOL, METERED ORAL at 19:43

## 2017-04-12 RX ADMIN — Medication 50 MILLIGRAM(S): at 10:12

## 2017-04-12 RX ADMIN — Medication 1 TABLET(S): at 09:35

## 2017-04-12 RX ADMIN — ARIPIPRAZOLE 20 MILLIGRAM(S): 15 TABLET ORAL at 09:35

## 2017-04-12 RX ADMIN — Medication 400 MILLIGRAM(S): at 20:52

## 2017-04-12 RX ADMIN — Medication 50 MILLIGRAM(S): at 23:52

## 2017-04-12 RX ADMIN — Medication 200 MILLIGRAM(S): at 22:28

## 2017-04-12 RX ADMIN — METFORMIN HYDROCHLORIDE 1000 MILLIGRAM(S): 850 TABLET ORAL at 09:35

## 2017-04-12 RX ADMIN — ALBUTEROL 1 PUFF(S): 90 AEROSOL, METERED ORAL at 13:34

## 2017-04-12 RX ADMIN — LAMOTRIGINE 25 MILLIGRAM(S): 25 TABLET, ORALLY DISINTEGRATING ORAL at 20:53

## 2017-04-12 RX ADMIN — Medication 1 TABLET(S): at 20:53

## 2017-04-12 RX ADMIN — PANTOPRAZOLE SODIUM 40 MILLIGRAM(S): 20 TABLET, DELAYED RELEASE ORAL at 09:37

## 2017-04-12 RX ADMIN — QUETIAPINE FUMARATE 50 MILLIGRAM(S): 200 TABLET, FILM COATED ORAL at 20:53

## 2017-04-12 RX ADMIN — DIVALPROEX SODIUM 500 MILLIGRAM(S): 500 TABLET, DELAYED RELEASE ORAL at 20:53

## 2017-04-12 RX ADMIN — VALSARTAN 320 MILLIGRAM(S): 80 TABLET ORAL at 09:37

## 2017-04-13 PROCEDURE — 90853 GROUP PSYCHOTHERAPY: CPT

## 2017-04-13 PROCEDURE — 99232 SBSQ HOSP IP/OBS MODERATE 35: CPT | Mod: 25

## 2017-04-13 RX ORDER — DIVALPROEX SODIUM 500 MG/1
750 TABLET, DELAYED RELEASE ORAL AT BEDTIME
Qty: 0 | Refills: 0 | Status: DISCONTINUED | OUTPATIENT
Start: 2017-04-13 | End: 2017-04-14

## 2017-04-13 RX ORDER — QUETIAPINE FUMARATE 200 MG/1
100 TABLET, FILM COATED ORAL AT BEDTIME
Qty: 0 | Refills: 0 | Status: DISCONTINUED | OUTPATIENT
Start: 2017-04-13 | End: 2017-04-14

## 2017-04-13 RX ADMIN — Medication: at 08:55

## 2017-04-13 RX ADMIN — Medication: at 12:42

## 2017-04-13 RX ADMIN — METFORMIN HYDROCHLORIDE 1000 MILLIGRAM(S): 850 TABLET ORAL at 08:47

## 2017-04-13 RX ADMIN — QUETIAPINE FUMARATE 100 MILLIGRAM(S): 200 TABLET, FILM COATED ORAL at 20:54

## 2017-04-13 RX ADMIN — Medication 100 MILLIGRAM(S): at 20:54

## 2017-04-13 RX ADMIN — Medication 600 MILLIGRAM(S): at 15:34

## 2017-04-13 RX ADMIN — Medication 600 MILLIGRAM(S): at 00:46

## 2017-04-13 RX ADMIN — METFORMIN HYDROCHLORIDE 1000 MILLIGRAM(S): 850 TABLET ORAL at 17:02

## 2017-04-13 RX ADMIN — ALBUTEROL 1 PUFF(S): 90 AEROSOL, METERED ORAL at 10:24

## 2017-04-13 RX ADMIN — ATORVASTATIN CALCIUM 20 MILLIGRAM(S): 80 TABLET, FILM COATED ORAL at 20:53

## 2017-04-13 RX ADMIN — PANTOPRAZOLE SODIUM 40 MILLIGRAM(S): 20 TABLET, DELAYED RELEASE ORAL at 08:47

## 2017-04-13 RX ADMIN — Medication 4 MILLIGRAM(S): at 08:47

## 2017-04-13 RX ADMIN — LAMOTRIGINE 25 MILLIGRAM(S): 25 TABLET, ORALLY DISINTEGRATING ORAL at 20:54

## 2017-04-13 RX ADMIN — Medication 30 MILLILITER(S): at 17:07

## 2017-04-13 RX ADMIN — Medication 1 TABLET(S): at 08:47

## 2017-04-13 RX ADMIN — Medication 1 TABLET(S): at 20:53

## 2017-04-13 RX ADMIN — ARIPIPRAZOLE 25 MILLIGRAM(S): 15 TABLET ORAL at 08:47

## 2017-04-13 RX ADMIN — AMLODIPINE BESYLATE 10 MILLIGRAM(S): 2.5 TABLET ORAL at 08:47

## 2017-04-13 RX ADMIN — ALBUTEROL 1 PUFF(S): 90 AEROSOL, METERED ORAL at 00:03

## 2017-04-13 RX ADMIN — DIVALPROEX SODIUM 750 MILLIGRAM(S): 500 TABLET, DELAYED RELEASE ORAL at 20:54

## 2017-04-13 RX ADMIN — CARVEDILOL PHOSPHATE 12.5 MILLIGRAM(S): 80 CAPSULE, EXTENDED RELEASE ORAL at 08:47

## 2017-04-13 RX ADMIN — Medication 400 MILLIGRAM(S): at 20:53

## 2017-04-13 RX ADMIN — ALBUTEROL 1 PUFF(S): 90 AEROSOL, METERED ORAL at 20:23

## 2017-04-13 RX ADMIN — Medication: at 17:03

## 2017-04-13 RX ADMIN — Medication 400 MILLIGRAM(S): at 08:47

## 2017-04-13 RX ADMIN — Medication 600 MILLIGRAM(S): at 16:04

## 2017-04-13 RX ADMIN — CARVEDILOL PHOSPHATE 12.5 MILLIGRAM(S): 80 CAPSULE, EXTENDED RELEASE ORAL at 20:53

## 2017-04-13 RX ADMIN — Medication 50 MILLIGRAM(S): at 14:22

## 2017-04-13 RX ADMIN — VALSARTAN 320 MILLIGRAM(S): 80 TABLET ORAL at 08:47

## 2017-04-14 PROCEDURE — 99232 SBSQ HOSP IP/OBS MODERATE 35: CPT

## 2017-04-14 RX ORDER — DIVALPROEX SODIUM 500 MG/1
1000 TABLET, DELAYED RELEASE ORAL AT BEDTIME
Qty: 0 | Refills: 0 | Status: DISCONTINUED | OUTPATIENT
Start: 2017-04-14 | End: 2017-04-26

## 2017-04-14 RX ORDER — QUETIAPINE FUMARATE 200 MG/1
150 TABLET, FILM COATED ORAL AT BEDTIME
Qty: 0 | Refills: 0 | Status: DISCONTINUED | OUTPATIENT
Start: 2017-04-14 | End: 2017-04-17

## 2017-04-14 RX ADMIN — Medication 600 MILLIGRAM(S): at 23:20

## 2017-04-14 RX ADMIN — QUETIAPINE FUMARATE 150 MILLIGRAM(S): 200 TABLET, FILM COATED ORAL at 20:35

## 2017-04-14 RX ADMIN — Medication 600 MILLIGRAM(S): at 14:03

## 2017-04-14 RX ADMIN — Medication 30 MILLILITER(S): at 22:20

## 2017-04-14 RX ADMIN — CARVEDILOL PHOSPHATE 12.5 MILLIGRAM(S): 80 CAPSULE, EXTENDED RELEASE ORAL at 09:31

## 2017-04-14 RX ADMIN — Medication: at 17:11

## 2017-04-14 RX ADMIN — LAMOTRIGINE 25 MILLIGRAM(S): 25 TABLET, ORALLY DISINTEGRATING ORAL at 20:35

## 2017-04-14 RX ADMIN — Medication: at 08:47

## 2017-04-14 RX ADMIN — Medication 400 MILLIGRAM(S): at 20:35

## 2017-04-14 RX ADMIN — Medication 400 MILLIGRAM(S): at 09:02

## 2017-04-14 RX ADMIN — Medication 1 TABLET(S): at 20:35

## 2017-04-14 RX ADMIN — METFORMIN HYDROCHLORIDE 1000 MILLIGRAM(S): 850 TABLET ORAL at 09:04

## 2017-04-14 RX ADMIN — ATORVASTATIN CALCIUM 20 MILLIGRAM(S): 80 TABLET, FILM COATED ORAL at 20:35

## 2017-04-14 RX ADMIN — Medication: at 14:02

## 2017-04-14 RX ADMIN — AMLODIPINE BESYLATE 10 MILLIGRAM(S): 2.5 TABLET ORAL at 09:02

## 2017-04-14 RX ADMIN — Medication 30 MILLILITER(S): at 08:46

## 2017-04-14 RX ADMIN — METFORMIN HYDROCHLORIDE 1000 MILLIGRAM(S): 850 TABLET ORAL at 17:12

## 2017-04-14 RX ADMIN — Medication 600 MILLIGRAM(S): at 22:39

## 2017-04-14 RX ADMIN — VALSARTAN 320 MILLIGRAM(S): 80 TABLET ORAL at 09:05

## 2017-04-14 RX ADMIN — Medication 50 MILLIGRAM(S): at 13:50

## 2017-04-14 RX ADMIN — PANTOPRAZOLE SODIUM 40 MILLIGRAM(S): 20 TABLET, DELAYED RELEASE ORAL at 09:04

## 2017-04-14 RX ADMIN — Medication 600 MILLIGRAM(S): at 08:47

## 2017-04-14 RX ADMIN — ARIPIPRAZOLE 25 MILLIGRAM(S): 15 TABLET ORAL at 09:02

## 2017-04-14 RX ADMIN — DIVALPROEX SODIUM 1000 MILLIGRAM(S): 500 TABLET, DELAYED RELEASE ORAL at 20:35

## 2017-04-14 RX ADMIN — Medication 4 MILLIGRAM(S): at 09:03

## 2017-04-14 RX ADMIN — ALBUTEROL 1 PUFF(S): 90 AEROSOL, METERED ORAL at 17:53

## 2017-04-14 RX ADMIN — Medication 1 TABLET(S): at 09:03

## 2017-04-14 RX ADMIN — CARVEDILOL PHOSPHATE 12.5 MILLIGRAM(S): 80 CAPSULE, EXTENDED RELEASE ORAL at 20:35

## 2017-04-15 RX ADMIN — Medication 400 MILLIGRAM(S): at 21:14

## 2017-04-15 RX ADMIN — ALBUTEROL 1 PUFF(S): 90 AEROSOL, METERED ORAL at 04:03

## 2017-04-15 RX ADMIN — METFORMIN HYDROCHLORIDE 1000 MILLIGRAM(S): 850 TABLET ORAL at 16:31

## 2017-04-15 RX ADMIN — Medication 4 MILLIGRAM(S): at 09:49

## 2017-04-15 RX ADMIN — Medication 1 TABLET(S): at 21:14

## 2017-04-15 RX ADMIN — VALSARTAN 320 MILLIGRAM(S): 80 TABLET ORAL at 09:49

## 2017-04-15 RX ADMIN — CARVEDILOL PHOSPHATE 12.5 MILLIGRAM(S): 80 CAPSULE, EXTENDED RELEASE ORAL at 09:49

## 2017-04-15 RX ADMIN — Medication 30 MILLILITER(S): at 09:50

## 2017-04-15 RX ADMIN — ARIPIPRAZOLE 25 MILLIGRAM(S): 15 TABLET ORAL at 09:49

## 2017-04-15 RX ADMIN — DIVALPROEX SODIUM 1000 MILLIGRAM(S): 500 TABLET, DELAYED RELEASE ORAL at 21:17

## 2017-04-15 RX ADMIN — Medication 400 MILLIGRAM(S): at 09:49

## 2017-04-15 RX ADMIN — Medication 50 MILLIGRAM(S): at 00:43

## 2017-04-15 RX ADMIN — Medication 50 MILLIGRAM(S): at 09:50

## 2017-04-15 RX ADMIN — AMLODIPINE BESYLATE 10 MILLIGRAM(S): 2.5 TABLET ORAL at 09:49

## 2017-04-15 RX ADMIN — LAMOTRIGINE 25 MILLIGRAM(S): 25 TABLET, ORALLY DISINTEGRATING ORAL at 21:18

## 2017-04-15 RX ADMIN — Medication 1 TABLET(S): at 09:49

## 2017-04-15 RX ADMIN — Medication 600 MILLIGRAM(S): at 05:04

## 2017-04-15 RX ADMIN — PANTOPRAZOLE SODIUM 40 MILLIGRAM(S): 20 TABLET, DELAYED RELEASE ORAL at 09:49

## 2017-04-15 RX ADMIN — Medication: at 12:20

## 2017-04-15 RX ADMIN — QUETIAPINE FUMARATE 150 MILLIGRAM(S): 200 TABLET, FILM COATED ORAL at 21:17

## 2017-04-15 RX ADMIN — Medication 600 MILLIGRAM(S): at 04:00

## 2017-04-15 RX ADMIN — CARVEDILOL PHOSPHATE 12.5 MILLIGRAM(S): 80 CAPSULE, EXTENDED RELEASE ORAL at 21:17

## 2017-04-15 RX ADMIN — Medication: at 08:21

## 2017-04-15 RX ADMIN — ATORVASTATIN CALCIUM 20 MILLIGRAM(S): 80 TABLET, FILM COATED ORAL at 21:14

## 2017-04-15 RX ADMIN — Medication 50 MILLIGRAM(S): at 18:18

## 2017-04-15 RX ADMIN — METFORMIN HYDROCHLORIDE 1000 MILLIGRAM(S): 850 TABLET ORAL at 09:49

## 2017-04-16 RX ADMIN — Medication 600 MILLIGRAM(S): at 22:21

## 2017-04-16 RX ADMIN — AMLODIPINE BESYLATE 10 MILLIGRAM(S): 2.5 TABLET ORAL at 09:14

## 2017-04-16 RX ADMIN — Medication 400 MILLIGRAM(S): at 09:14

## 2017-04-16 RX ADMIN — Medication 4 MILLIGRAM(S): at 09:14

## 2017-04-16 RX ADMIN — ALBUTEROL 1 PUFF(S): 90 AEROSOL, METERED ORAL at 02:50

## 2017-04-16 RX ADMIN — LAMOTRIGINE 25 MILLIGRAM(S): 25 TABLET, ORALLY DISINTEGRATING ORAL at 21:07

## 2017-04-16 RX ADMIN — Medication 1 TABLET(S): at 21:06

## 2017-04-16 RX ADMIN — Medication 30 MILLILITER(S): at 18:53

## 2017-04-16 RX ADMIN — CARVEDILOL PHOSPHATE 12.5 MILLIGRAM(S): 80 CAPSULE, EXTENDED RELEASE ORAL at 09:14

## 2017-04-16 RX ADMIN — ATORVASTATIN CALCIUM 20 MILLIGRAM(S): 80 TABLET, FILM COATED ORAL at 21:06

## 2017-04-16 RX ADMIN — Medication 30 MILLILITER(S): at 10:31

## 2017-04-16 RX ADMIN — Medication 100 MILLIGRAM(S): at 21:08

## 2017-04-16 RX ADMIN — ARIPIPRAZOLE 25 MILLIGRAM(S): 15 TABLET ORAL at 09:14

## 2017-04-16 RX ADMIN — PANTOPRAZOLE SODIUM 40 MILLIGRAM(S): 20 TABLET, DELAYED RELEASE ORAL at 09:14

## 2017-04-16 RX ADMIN — QUETIAPINE FUMARATE 150 MILLIGRAM(S): 200 TABLET, FILM COATED ORAL at 21:07

## 2017-04-16 RX ADMIN — Medication: at 08:01

## 2017-04-16 RX ADMIN — Medication 400 MILLIGRAM(S): at 21:06

## 2017-04-16 RX ADMIN — METFORMIN HYDROCHLORIDE 1000 MILLIGRAM(S): 850 TABLET ORAL at 16:39

## 2017-04-16 RX ADMIN — Medication: at 16:39

## 2017-04-16 RX ADMIN — VALSARTAN 320 MILLIGRAM(S): 80 TABLET ORAL at 09:14

## 2017-04-16 RX ADMIN — Medication 600 MILLIGRAM(S): at 21:20

## 2017-04-16 RX ADMIN — Medication 50 MILLIGRAM(S): at 18:28

## 2017-04-16 RX ADMIN — METFORMIN HYDROCHLORIDE 1000 MILLIGRAM(S): 850 TABLET ORAL at 09:14

## 2017-04-16 RX ADMIN — Medication 1 TABLET(S): at 09:14

## 2017-04-16 RX ADMIN — DIVALPROEX SODIUM 1000 MILLIGRAM(S): 500 TABLET, DELAYED RELEASE ORAL at 21:06

## 2017-04-16 RX ADMIN — CARVEDILOL PHOSPHATE 12.5 MILLIGRAM(S): 80 CAPSULE, EXTENDED RELEASE ORAL at 21:06

## 2017-04-16 RX ADMIN — Medication: at 12:50

## 2017-04-17 PROCEDURE — 99232 SBSQ HOSP IP/OBS MODERATE 35: CPT | Mod: 25

## 2017-04-17 PROCEDURE — 99232 SBSQ HOSP IP/OBS MODERATE 35: CPT

## 2017-04-17 PROCEDURE — 90853 GROUP PSYCHOTHERAPY: CPT

## 2017-04-17 RX ORDER — PIOGLITAZONE HYDROCHLORIDE 15 MG/1
15 TABLET ORAL DAILY
Qty: 0 | Refills: 0 | Status: DISCONTINUED | OUTPATIENT
Start: 2017-04-17 | End: 2017-04-19

## 2017-04-17 RX ORDER — ARIPIPRAZOLE 15 MG/1
30 TABLET ORAL DAILY
Qty: 0 | Refills: 0 | Status: DISCONTINUED | OUTPATIENT
Start: 2017-04-18 | End: 2017-04-21

## 2017-04-17 RX ORDER — QUETIAPINE FUMARATE 200 MG/1
200 TABLET, FILM COATED ORAL AT BEDTIME
Qty: 0 | Refills: 0 | Status: DISCONTINUED | OUTPATIENT
Start: 2017-04-17 | End: 2017-04-18

## 2017-04-17 RX ADMIN — PANTOPRAZOLE SODIUM 40 MILLIGRAM(S): 20 TABLET, DELAYED RELEASE ORAL at 08:40

## 2017-04-17 RX ADMIN — DIVALPROEX SODIUM 1000 MILLIGRAM(S): 500 TABLET, DELAYED RELEASE ORAL at 21:15

## 2017-04-17 RX ADMIN — Medication: at 12:10

## 2017-04-17 RX ADMIN — CARVEDILOL PHOSPHATE 12.5 MILLIGRAM(S): 80 CAPSULE, EXTENDED RELEASE ORAL at 21:15

## 2017-04-17 RX ADMIN — LAMOTRIGINE 25 MILLIGRAM(S): 25 TABLET, ORALLY DISINTEGRATING ORAL at 21:15

## 2017-04-17 RX ADMIN — ATORVASTATIN CALCIUM 20 MILLIGRAM(S): 80 TABLET, FILM COATED ORAL at 21:14

## 2017-04-17 RX ADMIN — Medication 1 TABLET(S): at 21:14

## 2017-04-17 RX ADMIN — Medication 400 MILLIGRAM(S): at 21:14

## 2017-04-17 RX ADMIN — QUETIAPINE FUMARATE 200 MILLIGRAM(S): 200 TABLET, FILM COATED ORAL at 22:34

## 2017-04-17 RX ADMIN — METFORMIN HYDROCHLORIDE 1000 MILLIGRAM(S): 850 TABLET ORAL at 18:09

## 2017-04-17 RX ADMIN — Medication: at 08:46

## 2017-04-17 RX ADMIN — ALBUTEROL 1 PUFF(S): 90 AEROSOL, METERED ORAL at 11:48

## 2017-04-17 RX ADMIN — ALBUTEROL 1 PUFF(S): 90 AEROSOL, METERED ORAL at 06:18

## 2017-04-17 RX ADMIN — Medication 50 MILLIGRAM(S): at 22:05

## 2017-04-17 RX ADMIN — METFORMIN HYDROCHLORIDE 1000 MILLIGRAM(S): 850 TABLET ORAL at 08:40

## 2017-04-17 RX ADMIN — AMLODIPINE BESYLATE 10 MILLIGRAM(S): 2.5 TABLET ORAL at 08:31

## 2017-04-17 RX ADMIN — Medication: at 18:09

## 2017-04-17 RX ADMIN — Medication 4 MILLIGRAM(S): at 08:40

## 2017-04-17 RX ADMIN — CARVEDILOL PHOSPHATE 12.5 MILLIGRAM(S): 80 CAPSULE, EXTENDED RELEASE ORAL at 08:31

## 2017-04-17 RX ADMIN — VALSARTAN 320 MILLIGRAM(S): 80 TABLET ORAL at 08:31

## 2017-04-17 RX ADMIN — Medication 1 TABLET(S): at 08:31

## 2017-04-17 RX ADMIN — Medication 400 MILLIGRAM(S): at 08:31

## 2017-04-17 RX ADMIN — ARIPIPRAZOLE 25 MILLIGRAM(S): 15 TABLET ORAL at 08:31

## 2017-04-18 LAB
ALBUMIN SERPL ELPH-MCNC: 4 G/DL — SIGNIFICANT CHANGE UP (ref 3.3–5)
ALP SERPL-CCNC: 97 U/L — SIGNIFICANT CHANGE UP (ref 40–120)
ALT FLD-CCNC: 21 U/L — SIGNIFICANT CHANGE UP (ref 4–33)
AST SERPL-CCNC: 17 U/L — SIGNIFICANT CHANGE UP (ref 4–32)
BASOPHILS # BLD AUTO: 0.03 K/UL — SIGNIFICANT CHANGE UP (ref 0–0.2)
BASOPHILS NFR BLD AUTO: 0.4 % — SIGNIFICANT CHANGE UP (ref 0–2)
BILIRUB SERPL-MCNC: 0.3 MG/DL — SIGNIFICANT CHANGE UP (ref 0.2–1.2)
BUN SERPL-MCNC: 21 MG/DL — SIGNIFICANT CHANGE UP (ref 7–23)
CALCIUM SERPL-MCNC: 10 MG/DL — SIGNIFICANT CHANGE UP (ref 8.4–10.5)
CHLORIDE SERPL-SCNC: 97 MMOL/L — LOW (ref 98–107)
CO2 SERPL-SCNC: 23 MMOL/L — SIGNIFICANT CHANGE UP (ref 22–31)
CREAT SERPL-MCNC: 0.57 MG/DL — SIGNIFICANT CHANGE UP (ref 0.5–1.3)
EOSINOPHIL # BLD AUTO: 0.16 K/UL — SIGNIFICANT CHANGE UP (ref 0–0.5)
EOSINOPHIL NFR BLD AUTO: 2.3 % — SIGNIFICANT CHANGE UP (ref 0–6)
GLUCOSE SERPL-MCNC: 326 MG/DL — HIGH (ref 70–99)
HCT VFR BLD CALC: 36.5 % — SIGNIFICANT CHANGE UP (ref 34.5–45)
HGB BLD-MCNC: 11.6 G/DL — SIGNIFICANT CHANGE UP (ref 11.5–15.5)
IMM GRANULOCYTES NFR BLD AUTO: 0.9 % — SIGNIFICANT CHANGE UP (ref 0–1.5)
LYMPHOCYTES # BLD AUTO: 3.05 K/UL — SIGNIFICANT CHANGE UP (ref 1–3.3)
LYMPHOCYTES # BLD AUTO: 43.3 % — SIGNIFICANT CHANGE UP (ref 13–44)
MCHC RBC-ENTMCNC: 28.8 PG — SIGNIFICANT CHANGE UP (ref 27–34)
MCHC RBC-ENTMCNC: 31.8 % — LOW (ref 32–36)
MCV RBC AUTO: 90.6 FL — SIGNIFICANT CHANGE UP (ref 80–100)
MONOCYTES # BLD AUTO: 0.52 K/UL — SIGNIFICANT CHANGE UP (ref 0–0.9)
MONOCYTES NFR BLD AUTO: 7.4 % — SIGNIFICANT CHANGE UP (ref 2–14)
NEUTROPHILS # BLD AUTO: 3.23 K/UL — SIGNIFICANT CHANGE UP (ref 1.8–7.4)
NEUTROPHILS NFR BLD AUTO: 45.7 % — SIGNIFICANT CHANGE UP (ref 43–77)
PLATELET # BLD AUTO: 345 K/UL — SIGNIFICANT CHANGE UP (ref 150–400)
PMV BLD: 10 FL — SIGNIFICANT CHANGE UP (ref 7–13)
POTASSIUM SERPL-MCNC: 4.2 MMOL/L — SIGNIFICANT CHANGE UP (ref 3.5–5.3)
POTASSIUM SERPL-SCNC: 4.2 MMOL/L — SIGNIFICANT CHANGE UP (ref 3.5–5.3)
PROT SERPL-MCNC: 6.6 G/DL — SIGNIFICANT CHANGE UP (ref 6–8.3)
RBC # BLD: 4.03 M/UL — SIGNIFICANT CHANGE UP (ref 3.8–5.2)
RBC # FLD: 12.6 % — SIGNIFICANT CHANGE UP (ref 10.3–14.5)
SODIUM SERPL-SCNC: 139 MMOL/L — SIGNIFICANT CHANGE UP (ref 135–145)
VALPROATE SERPL-MCNC: 68.3 UG/ML — SIGNIFICANT CHANGE UP (ref 50–100)
WBC # BLD: 7.05 K/UL — SIGNIFICANT CHANGE UP (ref 3.8–10.5)
WBC # FLD AUTO: 7.05 K/UL — SIGNIFICANT CHANGE UP (ref 3.8–10.5)

## 2017-04-18 PROCEDURE — 99232 SBSQ HOSP IP/OBS MODERATE 35: CPT

## 2017-04-18 RX ORDER — QUETIAPINE FUMARATE 200 MG/1
250 TABLET, FILM COATED ORAL AT BEDTIME
Qty: 0 | Refills: 0 | Status: DISCONTINUED | OUTPATIENT
Start: 2017-04-18 | End: 2017-04-20

## 2017-04-18 RX ADMIN — VALSARTAN 320 MILLIGRAM(S): 80 TABLET ORAL at 09:45

## 2017-04-18 RX ADMIN — DIVALPROEX SODIUM 1000 MILLIGRAM(S): 500 TABLET, DELAYED RELEASE ORAL at 21:09

## 2017-04-18 RX ADMIN — PANTOPRAZOLE SODIUM 40 MILLIGRAM(S): 20 TABLET, DELAYED RELEASE ORAL at 09:45

## 2017-04-18 RX ADMIN — ATORVASTATIN CALCIUM 20 MILLIGRAM(S): 80 TABLET, FILM COATED ORAL at 21:08

## 2017-04-18 RX ADMIN — AMLODIPINE BESYLATE 10 MILLIGRAM(S): 2.5 TABLET ORAL at 09:43

## 2017-04-18 RX ADMIN — QUETIAPINE FUMARATE 250 MILLIGRAM(S): 200 TABLET, FILM COATED ORAL at 21:09

## 2017-04-18 RX ADMIN — Medication 600 MILLIGRAM(S): at 09:46

## 2017-04-18 RX ADMIN — LAMOTRIGINE 25 MILLIGRAM(S): 25 TABLET, ORALLY DISINTEGRATING ORAL at 21:09

## 2017-04-18 RX ADMIN — Medication: at 09:47

## 2017-04-18 RX ADMIN — METFORMIN HYDROCHLORIDE 1000 MILLIGRAM(S): 850 TABLET ORAL at 16:10

## 2017-04-18 RX ADMIN — METFORMIN HYDROCHLORIDE 1000 MILLIGRAM(S): 850 TABLET ORAL at 09:44

## 2017-04-18 RX ADMIN — Medication: at 16:19

## 2017-04-18 RX ADMIN — Medication 30 MILLILITER(S): at 15:20

## 2017-04-18 RX ADMIN — Medication 50 MILLIGRAM(S): at 09:46

## 2017-04-18 RX ADMIN — CARVEDILOL PHOSPHATE 12.5 MILLIGRAM(S): 80 CAPSULE, EXTENDED RELEASE ORAL at 21:08

## 2017-04-18 RX ADMIN — CARVEDILOL PHOSPHATE 12.5 MILLIGRAM(S): 80 CAPSULE, EXTENDED RELEASE ORAL at 09:43

## 2017-04-18 RX ADMIN — PIOGLITAZONE HYDROCHLORIDE 15 MILLIGRAM(S): 15 TABLET ORAL at 09:45

## 2017-04-18 RX ADMIN — Medication: at 21:09

## 2017-04-18 RX ADMIN — Medication 4 MILLIGRAM(S): at 09:44

## 2017-04-18 RX ADMIN — ARIPIPRAZOLE 30 MILLIGRAM(S): 15 TABLET ORAL at 09:43

## 2017-04-18 RX ADMIN — Medication: at 11:52

## 2017-04-18 RX ADMIN — ALBUTEROL 1 PUFF(S): 90 AEROSOL, METERED ORAL at 13:23

## 2017-04-18 RX ADMIN — ALBUTEROL 1 PUFF(S): 90 AEROSOL, METERED ORAL at 09:46

## 2017-04-18 RX ADMIN — Medication 1 TABLET(S): at 09:43

## 2017-04-18 RX ADMIN — Medication 1 TABLET(S): at 21:08

## 2017-04-18 RX ADMIN — Medication 400 MILLIGRAM(S): at 21:08

## 2017-04-18 RX ADMIN — Medication 400 MILLIGRAM(S): at 09:42

## 2017-04-19 PROCEDURE — 99232 SBSQ HOSP IP/OBS MODERATE 35: CPT

## 2017-04-19 RX ADMIN — ALBUTEROL 1 PUFF(S): 90 AEROSOL, METERED ORAL at 06:14

## 2017-04-19 RX ADMIN — VALSARTAN 320 MILLIGRAM(S): 80 TABLET ORAL at 09:03

## 2017-04-19 RX ADMIN — ARIPIPRAZOLE 30 MILLIGRAM(S): 15 TABLET ORAL at 09:02

## 2017-04-19 RX ADMIN — LAMOTRIGINE 25 MILLIGRAM(S): 25 TABLET, ORALLY DISINTEGRATING ORAL at 20:50

## 2017-04-19 RX ADMIN — Medication: at 08:23

## 2017-04-19 RX ADMIN — Medication: at 16:52

## 2017-04-19 RX ADMIN — Medication 600 MILLIGRAM(S): at 03:20

## 2017-04-19 RX ADMIN — Medication 600 MILLIGRAM(S): at 14:04

## 2017-04-19 RX ADMIN — METFORMIN HYDROCHLORIDE 1000 MILLIGRAM(S): 850 TABLET ORAL at 09:02

## 2017-04-19 RX ADMIN — ATORVASTATIN CALCIUM 20 MILLIGRAM(S): 80 TABLET, FILM COATED ORAL at 20:50

## 2017-04-19 RX ADMIN — CARVEDILOL PHOSPHATE 12.5 MILLIGRAM(S): 80 CAPSULE, EXTENDED RELEASE ORAL at 20:50

## 2017-04-19 RX ADMIN — Medication 400 MILLIGRAM(S): at 20:50

## 2017-04-19 RX ADMIN — Medication 50 MILLIGRAM(S): at 12:16

## 2017-04-19 RX ADMIN — AMLODIPINE BESYLATE 10 MILLIGRAM(S): 2.5 TABLET ORAL at 09:02

## 2017-04-19 RX ADMIN — Medication: at 20:50

## 2017-04-19 RX ADMIN — CARVEDILOL PHOSPHATE 12.5 MILLIGRAM(S): 80 CAPSULE, EXTENDED RELEASE ORAL at 09:02

## 2017-04-19 RX ADMIN — Medication: at 12:28

## 2017-04-19 RX ADMIN — METFORMIN HYDROCHLORIDE 1000 MILLIGRAM(S): 850 TABLET ORAL at 16:52

## 2017-04-19 RX ADMIN — PIOGLITAZONE HYDROCHLORIDE 15 MILLIGRAM(S): 15 TABLET ORAL at 09:02

## 2017-04-19 RX ADMIN — QUETIAPINE FUMARATE 250 MILLIGRAM(S): 200 TABLET, FILM COATED ORAL at 20:50

## 2017-04-19 RX ADMIN — ALBUTEROL 1 PUFF(S): 90 AEROSOL, METERED ORAL at 12:16

## 2017-04-19 RX ADMIN — Medication 600 MILLIGRAM(S): at 04:14

## 2017-04-19 RX ADMIN — Medication 400 MILLIGRAM(S): at 09:02

## 2017-04-19 RX ADMIN — Medication 4 MILLIGRAM(S): at 09:02

## 2017-04-19 RX ADMIN — PANTOPRAZOLE SODIUM 40 MILLIGRAM(S): 20 TABLET, DELAYED RELEASE ORAL at 09:02

## 2017-04-19 RX ADMIN — Medication 600 MILLIGRAM(S): at 13:34

## 2017-04-19 RX ADMIN — DIVALPROEX SODIUM 1000 MILLIGRAM(S): 500 TABLET, DELAYED RELEASE ORAL at 20:50

## 2017-04-19 RX ADMIN — Medication 1 TABLET(S): at 20:50

## 2017-04-19 RX ADMIN — Medication 1 TABLET(S): at 09:02

## 2017-04-20 PROCEDURE — 99232 SBSQ HOSP IP/OBS MODERATE 35: CPT

## 2017-04-20 RX ORDER — QUETIAPINE FUMARATE 200 MG/1
300 TABLET, FILM COATED ORAL AT BEDTIME
Qty: 0 | Refills: 0 | Status: DISCONTINUED | OUTPATIENT
Start: 2017-04-20 | End: 2017-04-26

## 2017-04-20 RX ADMIN — Medication 1 TABLET(S): at 08:40

## 2017-04-20 RX ADMIN — Medication: at 16:48

## 2017-04-20 RX ADMIN — DIVALPROEX SODIUM 1000 MILLIGRAM(S): 500 TABLET, DELAYED RELEASE ORAL at 21:22

## 2017-04-20 RX ADMIN — PHENYLEPHRINE-SHARK LIVER OIL-MINERAL OIL-PETROLATUM RECTAL OINTMENT 1 APPLICATION(S): at 23:44

## 2017-04-20 RX ADMIN — CARVEDILOL PHOSPHATE 12.5 MILLIGRAM(S): 80 CAPSULE, EXTENDED RELEASE ORAL at 08:40

## 2017-04-20 RX ADMIN — ATORVASTATIN CALCIUM 20 MILLIGRAM(S): 80 TABLET, FILM COATED ORAL at 21:22

## 2017-04-20 RX ADMIN — Medication: at 21:32

## 2017-04-20 RX ADMIN — METFORMIN HYDROCHLORIDE 1000 MILLIGRAM(S): 850 TABLET ORAL at 16:04

## 2017-04-20 RX ADMIN — ALBUTEROL 1 PUFF(S): 90 AEROSOL, METERED ORAL at 23:44

## 2017-04-20 RX ADMIN — VALSARTAN 320 MILLIGRAM(S): 80 TABLET ORAL at 08:41

## 2017-04-20 RX ADMIN — Medication 30 MILLILITER(S): at 10:14

## 2017-04-20 RX ADMIN — AMLODIPINE BESYLATE 10 MILLIGRAM(S): 2.5 TABLET ORAL at 08:40

## 2017-04-20 RX ADMIN — Medication: at 12:10

## 2017-04-20 RX ADMIN — ALBUTEROL 1 PUFF(S): 90 AEROSOL, METERED ORAL at 16:06

## 2017-04-20 RX ADMIN — METFORMIN HYDROCHLORIDE 1000 MILLIGRAM(S): 850 TABLET ORAL at 08:40

## 2017-04-20 RX ADMIN — Medication: at 08:11

## 2017-04-20 RX ADMIN — LAMOTRIGINE 25 MILLIGRAM(S): 25 TABLET, ORALLY DISINTEGRATING ORAL at 21:23

## 2017-04-20 RX ADMIN — ARIPIPRAZOLE 30 MILLIGRAM(S): 15 TABLET ORAL at 08:40

## 2017-04-20 RX ADMIN — Medication 50 MILLIGRAM(S): at 09:12

## 2017-04-20 RX ADMIN — Medication 400 MILLIGRAM(S): at 08:40

## 2017-04-20 RX ADMIN — Medication 1 TABLET(S): at 21:22

## 2017-04-20 RX ADMIN — Medication 400 MILLIGRAM(S): at 21:22

## 2017-04-20 RX ADMIN — PANTOPRAZOLE SODIUM 40 MILLIGRAM(S): 20 TABLET, DELAYED RELEASE ORAL at 08:40

## 2017-04-20 RX ADMIN — CARVEDILOL PHOSPHATE 12.5 MILLIGRAM(S): 80 CAPSULE, EXTENDED RELEASE ORAL at 21:22

## 2017-04-20 RX ADMIN — Medication 4 MILLIGRAM(S): at 08:40

## 2017-04-20 RX ADMIN — QUETIAPINE FUMARATE 300 MILLIGRAM(S): 200 TABLET, FILM COATED ORAL at 21:23

## 2017-04-20 RX ADMIN — ALBUTEROL 1 PUFF(S): 90 AEROSOL, METERED ORAL at 09:12

## 2017-04-21 PROCEDURE — 93010 ELECTROCARDIOGRAM REPORT: CPT

## 2017-04-21 PROCEDURE — 99232 SBSQ HOSP IP/OBS MODERATE 35: CPT

## 2017-04-21 RX ORDER — ARIPIPRAZOLE 15 MG/1
15 TABLET ORAL DAILY
Qty: 0 | Refills: 0 | Status: DISCONTINUED | OUTPATIENT
Start: 2017-04-22 | End: 2017-04-24

## 2017-04-21 RX ADMIN — ARIPIPRAZOLE 30 MILLIGRAM(S): 15 TABLET ORAL at 08:44

## 2017-04-21 RX ADMIN — Medication 600 MILLIGRAM(S): at 11:19

## 2017-04-21 RX ADMIN — Medication 400 MILLIGRAM(S): at 08:44

## 2017-04-21 RX ADMIN — METFORMIN HYDROCHLORIDE 1000 MILLIGRAM(S): 850 TABLET ORAL at 08:43

## 2017-04-21 RX ADMIN — Medication 1 TABLET(S): at 21:51

## 2017-04-21 RX ADMIN — LAMOTRIGINE 25 MILLIGRAM(S): 25 TABLET, ORALLY DISINTEGRATING ORAL at 21:51

## 2017-04-21 RX ADMIN — Medication: at 12:23

## 2017-04-21 RX ADMIN — Medication 400 MILLIGRAM(S): at 21:51

## 2017-04-21 RX ADMIN — CARVEDILOL PHOSPHATE 12.5 MILLIGRAM(S): 80 CAPSULE, EXTENDED RELEASE ORAL at 08:44

## 2017-04-21 RX ADMIN — AMLODIPINE BESYLATE 10 MILLIGRAM(S): 2.5 TABLET ORAL at 08:44

## 2017-04-21 RX ADMIN — Medication: at 17:05

## 2017-04-21 RX ADMIN — ATORVASTATIN CALCIUM 20 MILLIGRAM(S): 80 TABLET, FILM COATED ORAL at 21:51

## 2017-04-21 RX ADMIN — Medication 50 MILLIGRAM(S): at 01:46

## 2017-04-21 RX ADMIN — Medication 4 MILLIGRAM(S): at 08:43

## 2017-04-21 RX ADMIN — Medication 600 MILLIGRAM(S): at 11:20

## 2017-04-21 RX ADMIN — PANTOPRAZOLE SODIUM 40 MILLIGRAM(S): 20 TABLET, DELAYED RELEASE ORAL at 08:43

## 2017-04-21 RX ADMIN — ALBUTEROL 1 PUFF(S): 90 AEROSOL, METERED ORAL at 11:20

## 2017-04-21 RX ADMIN — PHENYLEPHRINE-SHARK LIVER OIL-MINERAL OIL-PETROLATUM RECTAL OINTMENT 1 APPLICATION(S): at 11:19

## 2017-04-21 RX ADMIN — QUETIAPINE FUMARATE 300 MILLIGRAM(S): 200 TABLET, FILM COATED ORAL at 21:50

## 2017-04-21 RX ADMIN — METFORMIN HYDROCHLORIDE 1000 MILLIGRAM(S): 850 TABLET ORAL at 17:05

## 2017-04-21 RX ADMIN — Medication 1 TABLET(S): at 08:44

## 2017-04-21 RX ADMIN — Medication: at 07:43

## 2017-04-21 RX ADMIN — CARVEDILOL PHOSPHATE 12.5 MILLIGRAM(S): 80 CAPSULE, EXTENDED RELEASE ORAL at 21:51

## 2017-04-21 RX ADMIN — Medication 30 MILLILITER(S): at 19:27

## 2017-04-21 RX ADMIN — VALSARTAN 320 MILLIGRAM(S): 80 TABLET ORAL at 08:44

## 2017-04-21 RX ADMIN — DIVALPROEX SODIUM 1000 MILLIGRAM(S): 500 TABLET, DELAYED RELEASE ORAL at 21:51

## 2017-04-22 RX ADMIN — ARIPIPRAZOLE 15 MILLIGRAM(S): 15 TABLET ORAL at 08:57

## 2017-04-22 RX ADMIN — Medication 1 TABLET(S): at 21:11

## 2017-04-22 RX ADMIN — QUETIAPINE FUMARATE 300 MILLIGRAM(S): 200 TABLET, FILM COATED ORAL at 21:14

## 2017-04-22 RX ADMIN — PANTOPRAZOLE SODIUM 40 MILLIGRAM(S): 20 TABLET, DELAYED RELEASE ORAL at 08:58

## 2017-04-22 RX ADMIN — Medication: at 12:46

## 2017-04-22 RX ADMIN — METFORMIN HYDROCHLORIDE 1000 MILLIGRAM(S): 850 TABLET ORAL at 08:57

## 2017-04-22 RX ADMIN — Medication 400 MILLIGRAM(S): at 21:11

## 2017-04-22 RX ADMIN — Medication 4 MILLIGRAM(S): at 08:57

## 2017-04-22 RX ADMIN — CARVEDILOL PHOSPHATE 12.5 MILLIGRAM(S): 80 CAPSULE, EXTENDED RELEASE ORAL at 08:57

## 2017-04-22 RX ADMIN — DIVALPROEX SODIUM 1000 MILLIGRAM(S): 500 TABLET, DELAYED RELEASE ORAL at 21:11

## 2017-04-22 RX ADMIN — Medication 30 MILLILITER(S): at 09:28

## 2017-04-22 RX ADMIN — Medication 1 TABLET(S): at 08:57

## 2017-04-22 RX ADMIN — Medication: at 16:47

## 2017-04-22 RX ADMIN — METFORMIN HYDROCHLORIDE 1000 MILLIGRAM(S): 850 TABLET ORAL at 16:52

## 2017-04-22 RX ADMIN — ALBUTEROL 1 PUFF(S): 90 AEROSOL, METERED ORAL at 05:55

## 2017-04-22 RX ADMIN — CARVEDILOL PHOSPHATE 12.5 MILLIGRAM(S): 80 CAPSULE, EXTENDED RELEASE ORAL at 21:11

## 2017-04-22 RX ADMIN — Medication 400 MILLIGRAM(S): at 08:57

## 2017-04-22 RX ADMIN — Medication: at 08:31

## 2017-04-22 RX ADMIN — ATORVASTATIN CALCIUM 20 MILLIGRAM(S): 80 TABLET, FILM COATED ORAL at 21:11

## 2017-04-22 RX ADMIN — VALSARTAN 320 MILLIGRAM(S): 80 TABLET ORAL at 08:58

## 2017-04-22 RX ADMIN — LAMOTRIGINE 25 MILLIGRAM(S): 25 TABLET, ORALLY DISINTEGRATING ORAL at 21:14

## 2017-04-22 RX ADMIN — AMLODIPINE BESYLATE 10 MILLIGRAM(S): 2.5 TABLET ORAL at 08:57

## 2017-04-23 RX ORDER — FUROSEMIDE 40 MG
5 TABLET ORAL ONCE
Qty: 0 | Refills: 0 | Status: COMPLETED | OUTPATIENT
Start: 2017-04-23 | End: 2017-04-23

## 2017-04-23 RX ADMIN — Medication: at 12:38

## 2017-04-23 RX ADMIN — QUETIAPINE FUMARATE 300 MILLIGRAM(S): 200 TABLET, FILM COATED ORAL at 20:54

## 2017-04-23 RX ADMIN — Medication 400 MILLIGRAM(S): at 20:53

## 2017-04-23 RX ADMIN — Medication 1 TABLET(S): at 20:53

## 2017-04-23 RX ADMIN — Medication 600 MILLIGRAM(S): at 18:47

## 2017-04-23 RX ADMIN — METFORMIN HYDROCHLORIDE 1000 MILLIGRAM(S): 850 TABLET ORAL at 16:51

## 2017-04-23 RX ADMIN — CARVEDILOL PHOSPHATE 12.5 MILLIGRAM(S): 80 CAPSULE, EXTENDED RELEASE ORAL at 08:53

## 2017-04-23 RX ADMIN — ARIPIPRAZOLE 15 MILLIGRAM(S): 15 TABLET ORAL at 08:53

## 2017-04-23 RX ADMIN — Medication 1 TABLET(S): at 08:53

## 2017-04-23 RX ADMIN — Medication 30 MILLILITER(S): at 15:29

## 2017-04-23 RX ADMIN — Medication: at 16:51

## 2017-04-23 RX ADMIN — CARVEDILOL PHOSPHATE 12.5 MILLIGRAM(S): 80 CAPSULE, EXTENDED RELEASE ORAL at 20:54

## 2017-04-23 RX ADMIN — Medication 5 MILLIGRAM(S): at 22:10

## 2017-04-23 RX ADMIN — Medication 600 MILLIGRAM(S): at 04:43

## 2017-04-23 RX ADMIN — DIVALPROEX SODIUM 1000 MILLIGRAM(S): 500 TABLET, DELAYED RELEASE ORAL at 20:54

## 2017-04-23 RX ADMIN — LAMOTRIGINE 25 MILLIGRAM(S): 25 TABLET, ORALLY DISINTEGRATING ORAL at 20:54

## 2017-04-23 RX ADMIN — AMLODIPINE BESYLATE 10 MILLIGRAM(S): 2.5 TABLET ORAL at 08:53

## 2017-04-23 RX ADMIN — VALSARTAN 320 MILLIGRAM(S): 80 TABLET ORAL at 08:53

## 2017-04-23 RX ADMIN — Medication 400 MILLIGRAM(S): at 08:53

## 2017-04-23 RX ADMIN — Medication 600 MILLIGRAM(S): at 05:43

## 2017-04-23 RX ADMIN — PANTOPRAZOLE SODIUM 40 MILLIGRAM(S): 20 TABLET, DELAYED RELEASE ORAL at 08:53

## 2017-04-23 RX ADMIN — Medication 4 MILLIGRAM(S): at 08:53

## 2017-04-23 RX ADMIN — ATORVASTATIN CALCIUM 20 MILLIGRAM(S): 80 TABLET, FILM COATED ORAL at 20:53

## 2017-04-23 RX ADMIN — METFORMIN HYDROCHLORIDE 1000 MILLIGRAM(S): 850 TABLET ORAL at 08:53

## 2017-04-23 RX ADMIN — Medication: at 08:52

## 2017-04-24 PROCEDURE — 99233 SBSQ HOSP IP/OBS HIGH 50: CPT

## 2017-04-24 PROCEDURE — 99232 SBSQ HOSP IP/OBS MODERATE 35: CPT

## 2017-04-24 RX ORDER — CARVEDILOL PHOSPHATE 80 MG/1
25 CAPSULE, EXTENDED RELEASE ORAL EVERY 12 HOURS
Qty: 0 | Refills: 0 | Status: DISCONTINUED | OUTPATIENT
Start: 2017-04-24 | End: 2017-04-26

## 2017-04-24 RX ORDER — ARIPIPRAZOLE 15 MG/1
10 TABLET ORAL DAILY
Qty: 0 | Refills: 0 | Status: DISCONTINUED | OUTPATIENT
Start: 2017-04-25 | End: 2017-04-25

## 2017-04-24 RX ADMIN — Medication 1 TABLET(S): at 20:59

## 2017-04-24 RX ADMIN — Medication 4 MILLIGRAM(S): at 09:05

## 2017-04-24 RX ADMIN — METFORMIN HYDROCHLORIDE 1000 MILLIGRAM(S): 850 TABLET ORAL at 16:49

## 2017-04-24 RX ADMIN — LAMOTRIGINE 25 MILLIGRAM(S): 25 TABLET, ORALLY DISINTEGRATING ORAL at 20:59

## 2017-04-24 RX ADMIN — METFORMIN HYDROCHLORIDE 1000 MILLIGRAM(S): 850 TABLET ORAL at 09:05

## 2017-04-24 RX ADMIN — QUETIAPINE FUMARATE 300 MILLIGRAM(S): 200 TABLET, FILM COATED ORAL at 20:59

## 2017-04-24 RX ADMIN — VALSARTAN 320 MILLIGRAM(S): 80 TABLET ORAL at 09:05

## 2017-04-24 RX ADMIN — CARVEDILOL PHOSPHATE 25 MILLIGRAM(S): 80 CAPSULE, EXTENDED RELEASE ORAL at 20:59

## 2017-04-24 RX ADMIN — ARIPIPRAZOLE 15 MILLIGRAM(S): 15 TABLET ORAL at 09:05

## 2017-04-24 RX ADMIN — CARVEDILOL PHOSPHATE 12.5 MILLIGRAM(S): 80 CAPSULE, EXTENDED RELEASE ORAL at 09:05

## 2017-04-24 RX ADMIN — Medication: at 16:47

## 2017-04-24 RX ADMIN — Medication 400 MILLIGRAM(S): at 09:05

## 2017-04-24 RX ADMIN — ATORVASTATIN CALCIUM 20 MILLIGRAM(S): 80 TABLET, FILM COATED ORAL at 20:59

## 2017-04-24 RX ADMIN — PANTOPRAZOLE SODIUM 40 MILLIGRAM(S): 20 TABLET, DELAYED RELEASE ORAL at 09:05

## 2017-04-24 RX ADMIN — AMLODIPINE BESYLATE 10 MILLIGRAM(S): 2.5 TABLET ORAL at 09:05

## 2017-04-24 RX ADMIN — Medication: at 11:54

## 2017-04-24 RX ADMIN — Medication 400 MILLIGRAM(S): at 20:59

## 2017-04-24 RX ADMIN — Medication: at 08:05

## 2017-04-24 RX ADMIN — Medication 1 TABLET(S): at 09:05

## 2017-04-24 RX ADMIN — DIVALPROEX SODIUM 1000 MILLIGRAM(S): 500 TABLET, DELAYED RELEASE ORAL at 20:59

## 2017-04-25 PROCEDURE — 99232 SBSQ HOSP IP/OBS MODERATE 35: CPT

## 2017-04-25 RX ORDER — PHENYLEPHRINE-SHARK LIVER OIL-MINERAL OIL-PETROLATUM RECTAL OINTMENT
1 OINTMENT (GRAM) RECTAL
Qty: 0 | Refills: 0 | COMMUNITY
Start: 2017-04-25

## 2017-04-25 RX ORDER — DIVALPROEX SODIUM 500 MG/1
2 TABLET, DELAYED RELEASE ORAL
Qty: 30 | Refills: 0 | OUTPATIENT
Start: 2017-04-25 | End: 2017-05-10

## 2017-04-25 RX ORDER — ACYCLOVIR SODIUM 500 MG
1 VIAL (EA) INTRAVENOUS
Qty: 30 | Refills: 0 | OUTPATIENT
Start: 2017-04-25 | End: 2017-05-10

## 2017-04-25 RX ORDER — GLIMEPIRIDE 1 MG
1 TABLET ORAL
Qty: 15 | Refills: 0 | OUTPATIENT
Start: 2017-04-25 | End: 2017-05-10

## 2017-04-25 RX ORDER — PANTOPRAZOLE SODIUM 20 MG/1
1 TABLET, DELAYED RELEASE ORAL
Qty: 15 | Refills: 0 | OUTPATIENT
Start: 2017-04-25 | End: 2017-05-10

## 2017-04-25 RX ORDER — ARIPIPRAZOLE 15 MG/1
5 TABLET ORAL DAILY
Qty: 0 | Refills: 0 | Status: DISCONTINUED | OUTPATIENT
Start: 2017-04-26 | End: 2017-04-26

## 2017-04-25 RX ORDER — SITAGLIPTIN 50 MG/1
1 TABLET, FILM COATED ORAL
Qty: 15 | Refills: 0 | OUTPATIENT
Start: 2017-04-25 | End: 2017-05-10

## 2017-04-25 RX ORDER — ARIPIPRAZOLE 15 MG/1
1 TABLET ORAL
Qty: 15 | Refills: 0 | OUTPATIENT
Start: 2017-04-25 | End: 2017-05-10

## 2017-04-25 RX ORDER — VALSARTAN 80 MG/1
1 TABLET ORAL
Qty: 15 | Refills: 0 | OUTPATIENT
Start: 2017-04-25 | End: 2017-05-10

## 2017-04-25 RX ORDER — LAMOTRIGINE 25 MG/1
1 TABLET, ORALLY DISINTEGRATING ORAL
Qty: 15 | Refills: 0 | OUTPATIENT
Start: 2017-04-25 | End: 2017-05-10

## 2017-04-25 RX ORDER — DIVALPROEX SODIUM 500 MG/1
1 TABLET, DELAYED RELEASE ORAL
Qty: 30 | Refills: 0 | COMMUNITY
Start: 2017-04-25 | End: 2017-05-10

## 2017-04-25 RX ORDER — ARIPIPRAZOLE 15 MG/1
1 TABLET ORAL
Qty: 0 | Refills: 0 | COMMUNITY

## 2017-04-25 RX ORDER — SITAGLIPTIN 50 MG/1
1 TABLET, FILM COATED ORAL
Qty: 0 | Refills: 0 | COMMUNITY

## 2017-04-25 RX ORDER — METFORMIN HYDROCHLORIDE 850 MG/1
1 TABLET ORAL
Qty: 30 | Refills: 0 | OUTPATIENT
Start: 2017-04-25 | End: 2017-05-10

## 2017-04-25 RX ORDER — LAMOTRIGINE 25 MG/1
100 TABLET, ORALLY DISINTEGRATING ORAL
Qty: 0 | Refills: 0 | COMMUNITY

## 2017-04-25 RX ORDER — ALBUTEROL 90 UG/1
1 AEROSOL, METERED ORAL
Qty: 1 | Refills: 0 | OUTPATIENT
Start: 2017-04-25 | End: 2017-05-10

## 2017-04-25 RX ORDER — CARVEDILOL PHOSPHATE 80 MG/1
1 CAPSULE, EXTENDED RELEASE ORAL
Qty: 30 | Refills: 0 | OUTPATIENT
Start: 2017-04-25 | End: 2017-05-10

## 2017-04-25 RX ADMIN — ARIPIPRAZOLE 10 MILLIGRAM(S): 15 TABLET ORAL at 08:32

## 2017-04-25 RX ADMIN — VALSARTAN 320 MILLIGRAM(S): 80 TABLET ORAL at 08:32

## 2017-04-25 RX ADMIN — METFORMIN HYDROCHLORIDE 1000 MILLIGRAM(S): 850 TABLET ORAL at 16:57

## 2017-04-25 RX ADMIN — Medication: at 12:25

## 2017-04-25 RX ADMIN — Medication: at 16:56

## 2017-04-25 RX ADMIN — Medication 1 TABLET(S): at 08:32

## 2017-04-25 RX ADMIN — Medication 400 MILLIGRAM(S): at 08:32

## 2017-04-25 RX ADMIN — Medication 1 TABLET(S): at 21:25

## 2017-04-25 RX ADMIN — METFORMIN HYDROCHLORIDE 1000 MILLIGRAM(S): 850 TABLET ORAL at 08:32

## 2017-04-25 RX ADMIN — ALBUTEROL 1 PUFF(S): 90 AEROSOL, METERED ORAL at 19:03

## 2017-04-25 RX ADMIN — Medication 4 MILLIGRAM(S): at 08:32

## 2017-04-25 RX ADMIN — PANTOPRAZOLE SODIUM 40 MILLIGRAM(S): 20 TABLET, DELAYED RELEASE ORAL at 08:32

## 2017-04-25 RX ADMIN — CARVEDILOL PHOSPHATE 25 MILLIGRAM(S): 80 CAPSULE, EXTENDED RELEASE ORAL at 08:32

## 2017-04-25 RX ADMIN — Medication 400 MILLIGRAM(S): at 21:25

## 2017-04-25 RX ADMIN — DIVALPROEX SODIUM 1000 MILLIGRAM(S): 500 TABLET, DELAYED RELEASE ORAL at 21:25

## 2017-04-25 RX ADMIN — Medication: at 08:32

## 2017-04-25 RX ADMIN — QUETIAPINE FUMARATE 300 MILLIGRAM(S): 200 TABLET, FILM COATED ORAL at 21:26

## 2017-04-25 RX ADMIN — ATORVASTATIN CALCIUM 20 MILLIGRAM(S): 80 TABLET, FILM COATED ORAL at 21:25

## 2017-04-25 RX ADMIN — CARVEDILOL PHOSPHATE 25 MILLIGRAM(S): 80 CAPSULE, EXTENDED RELEASE ORAL at 21:25

## 2017-04-26 VITALS — TEMPERATURE: 99 F | RESPIRATION RATE: 18 BRPM

## 2017-04-26 PROCEDURE — 99239 HOSP IP/OBS DSCHRG MGMT >30: CPT

## 2017-04-26 RX ADMIN — METFORMIN HYDROCHLORIDE 1000 MILLIGRAM(S): 850 TABLET ORAL at 16:19

## 2017-04-26 RX ADMIN — ARIPIPRAZOLE 5 MILLIGRAM(S): 15 TABLET ORAL at 09:01

## 2017-04-26 RX ADMIN — PANTOPRAZOLE SODIUM 40 MILLIGRAM(S): 20 TABLET, DELAYED RELEASE ORAL at 09:02

## 2017-04-26 RX ADMIN — Medication: at 16:36

## 2017-04-26 RX ADMIN — Medication: at 11:35

## 2017-04-26 RX ADMIN — Medication 4 MILLIGRAM(S): at 09:02

## 2017-04-26 RX ADMIN — Medication 400 MILLIGRAM(S): at 09:01

## 2017-04-26 RX ADMIN — Medication: at 08:02

## 2017-04-26 RX ADMIN — METFORMIN HYDROCHLORIDE 1000 MILLIGRAM(S): 850 TABLET ORAL at 09:02

## 2017-04-26 RX ADMIN — VALSARTAN 320 MILLIGRAM(S): 80 TABLET ORAL at 09:02

## 2017-04-26 RX ADMIN — CARVEDILOL PHOSPHATE 25 MILLIGRAM(S): 80 CAPSULE, EXTENDED RELEASE ORAL at 09:02

## 2017-04-26 RX ADMIN — Medication 1 TABLET(S): at 09:02

## 2017-04-27 RX ORDER — QUETIAPINE FUMARATE 200 MG/1
1 TABLET, FILM COATED ORAL
Qty: 15 | Refills: 0 | OUTPATIENT
Start: 2017-04-27 | End: 2017-05-12

## 2017-05-01 ENCOUNTER — OUTPATIENT (OUTPATIENT)
Dept: OUTPATIENT SERVICES | Facility: HOSPITAL | Age: 57
LOS: 1 days | Discharge: HOME | End: 2017-05-01

## 2017-05-02 DIAGNOSIS — F31.9 BIPOLAR DISORDER, UNSPECIFIED: ICD-10-CM

## 2017-06-07 ENCOUNTER — INPATIENT (INPATIENT)
Facility: HOSPITAL | Age: 57
LOS: 1 days | Discharge: HOME | End: 2017-06-09
Attending: HOSPITALIST | Admitting: HOSPITALIST
Payer: MEDICAID

## 2017-06-07 VITALS
RESPIRATION RATE: 18 BRPM | SYSTOLIC BLOOD PRESSURE: 113 MMHG | TEMPERATURE: 98 F | OXYGEN SATURATION: 97 % | HEART RATE: 100 BPM | DIASTOLIC BLOOD PRESSURE: 74 MMHG

## 2017-06-07 DIAGNOSIS — R10.9 UNSPECIFIED ABDOMINAL PAIN: ICD-10-CM

## 2017-06-07 DIAGNOSIS — F32.9 MAJOR DEPRESSIVE DISORDER, SINGLE EPISODE, UNSPECIFIED: ICD-10-CM

## 2017-06-07 DIAGNOSIS — R82.90 UNSPECIFIED ABNORMAL FINDINGS IN URINE: ICD-10-CM

## 2017-06-07 DIAGNOSIS — I10 ESSENTIAL (PRIMARY) HYPERTENSION: ICD-10-CM

## 2017-06-07 DIAGNOSIS — E11.9 TYPE 2 DIABETES MELLITUS WITHOUT COMPLICATIONS: ICD-10-CM

## 2017-06-07 DIAGNOSIS — E87.2 ACIDOSIS: ICD-10-CM

## 2017-06-07 DIAGNOSIS — Z79.01 LONG TERM (CURRENT) USE OF ANTICOAGULANTS: ICD-10-CM

## 2017-06-07 LAB
ALBUMIN SERPL ELPH-MCNC: 4.1 G/DL — SIGNIFICANT CHANGE UP (ref 3.3–5)
ALP SERPL-CCNC: 73 U/L — SIGNIFICANT CHANGE UP (ref 40–120)
ALT FLD-CCNC: 12 U/L — SIGNIFICANT CHANGE UP (ref 4–33)
AMYLASE P1 CFR SERPL: 57 U/L — SIGNIFICANT CHANGE UP (ref 25–125)
APPEARANCE UR: SIGNIFICANT CHANGE UP
AST SERPL-CCNC: 14 U/L — SIGNIFICANT CHANGE UP (ref 4–32)
BACTERIA # UR AUTO: SIGNIFICANT CHANGE UP
BASE EXCESS BLDV CALC-SCNC: 4.4 MMOL/L — SIGNIFICANT CHANGE UP
BASE EXCESS BLDV CALC-SCNC: 5.6 MMOL/L — SIGNIFICANT CHANGE UP
BASOPHILS # BLD AUTO: 0.03 K/UL — SIGNIFICANT CHANGE UP (ref 0–0.2)
BASOPHILS NFR BLD AUTO: 0.3 % — SIGNIFICANT CHANGE UP (ref 0–2)
BILIRUB SERPL-MCNC: 0.4 MG/DL — SIGNIFICANT CHANGE UP (ref 0.2–1.2)
BILIRUB UR-MCNC: SIGNIFICANT CHANGE UP
BLOOD GAS VENOUS - CREATININE: 0.59 MG/DL — SIGNIFICANT CHANGE UP (ref 0.5–1.3)
BLOOD GAS VENOUS - CREATININE: 0.73 MG/DL — SIGNIFICANT CHANGE UP (ref 0.5–1.3)
BLOOD UR QL VISUAL: HIGH
BUN SERPL-MCNC: 19 MG/DL — SIGNIFICANT CHANGE UP (ref 7–23)
CALCIUM SERPL-MCNC: 9.8 MG/DL — SIGNIFICANT CHANGE UP (ref 8.4–10.5)
CHLORIDE BLDV-SCNC: 98 MMOL/L — SIGNIFICANT CHANGE UP (ref 96–108)
CHLORIDE BLDV-SCNC: 99 MMOL/L — SIGNIFICANT CHANGE UP (ref 96–108)
CHLORIDE SERPL-SCNC: 95 MMOL/L — LOW (ref 98–107)
CO2 SERPL-SCNC: 26 MMOL/L — SIGNIFICANT CHANGE UP (ref 22–31)
COLOR SPEC: YELLOW — SIGNIFICANT CHANGE UP
CREAT SERPL-MCNC: 0.88 MG/DL — SIGNIFICANT CHANGE UP (ref 0.5–1.3)
EOSINOPHIL # BLD AUTO: 0.06 K/UL — SIGNIFICANT CHANGE UP (ref 0–0.5)
EOSINOPHIL NFR BLD AUTO: 0.6 % — SIGNIFICANT CHANGE UP (ref 0–6)
GAS PNL BLDV: 140 MMOL/L — SIGNIFICANT CHANGE UP (ref 136–146)
GAS PNL BLDV: 140 MMOL/L — SIGNIFICANT CHANGE UP (ref 136–146)
GLUCOSE BLDV-MCNC: 137 — HIGH (ref 70–99)
GLUCOSE BLDV-MCNC: 150 — HIGH (ref 70–99)
GLUCOSE SERPL-MCNC: 133 MG/DL — HIGH (ref 70–99)
GLUCOSE UR-MCNC: 50 — SIGNIFICANT CHANGE UP
HCO3 BLDV-SCNC: 26 MMOL/L — SIGNIFICANT CHANGE UP (ref 20–27)
HCO3 BLDV-SCNC: 27 MMOL/L — SIGNIFICANT CHANGE UP (ref 20–27)
HCT VFR BLD CALC: 42 % — SIGNIFICANT CHANGE UP (ref 34.5–45)
HCT VFR BLDV CALC: 41.3 % — SIGNIFICANT CHANGE UP (ref 34.5–45)
HCT VFR BLDV CALC: 43.5 % — SIGNIFICANT CHANGE UP (ref 34.5–45)
HGB BLD-MCNC: 14 G/DL — SIGNIFICANT CHANGE UP (ref 11.5–15.5)
HGB BLDV-MCNC: 13.5 G/DL — SIGNIFICANT CHANGE UP (ref 11.5–15.5)
HGB BLDV-MCNC: 14.2 G/DL — SIGNIFICANT CHANGE UP (ref 11.5–15.5)
HYALINE CASTS # UR AUTO: SIGNIFICANT CHANGE UP (ref 0–?)
IMM GRANULOCYTES NFR BLD AUTO: 0.3 % — SIGNIFICANT CHANGE UP (ref 0–1.5)
KETONES UR-MCNC: SIGNIFICANT CHANGE UP
LACTATE BLDV-MCNC: 5.1 MMOL/L — CRITICAL HIGH (ref 0.5–2)
LACTATE BLDV-MCNC: 5.6 MMOL/L — CRITICAL HIGH (ref 0.5–2)
LEUKOCYTE ESTERASE UR-ACNC: HIGH
LIDOCAIN IGE QN: 15.6 U/L — SIGNIFICANT CHANGE UP (ref 7–60)
LYMPHOCYTES # BLD AUTO: 2.38 K/UL — SIGNIFICANT CHANGE UP (ref 1–3.3)
LYMPHOCYTES # BLD AUTO: 23.4 % — SIGNIFICANT CHANGE UP (ref 13–44)
MCHC RBC-ENTMCNC: 30.1 PG — SIGNIFICANT CHANGE UP (ref 27–34)
MCHC RBC-ENTMCNC: 33.3 % — SIGNIFICANT CHANGE UP (ref 32–36)
MCV RBC AUTO: 90.3 FL — SIGNIFICANT CHANGE UP (ref 80–100)
MONOCYTES # BLD AUTO: 0.6 K/UL — SIGNIFICANT CHANGE UP (ref 0–0.9)
MONOCYTES NFR BLD AUTO: 5.9 % — SIGNIFICANT CHANGE UP (ref 2–14)
MUCOUS THREADS # UR AUTO: SIGNIFICANT CHANGE UP
NEUTROPHILS # BLD AUTO: 7.07 K/UL — SIGNIFICANT CHANGE UP (ref 1.8–7.4)
NEUTROPHILS NFR BLD AUTO: 69.5 % — SIGNIFICANT CHANGE UP (ref 43–77)
NITRITE UR-MCNC: NEGATIVE — SIGNIFICANT CHANGE UP
NON-SQ EPI CELLS # UR AUTO: 1 — SIGNIFICANT CHANGE UP
PCO2 BLDV: 58 MMHG — HIGH (ref 41–51)
PCO2 BLDV: 62 MMHG — HIGH (ref 41–51)
PH BLDV: 7.31 PH — LOW (ref 7.32–7.43)
PH BLDV: 7.35 PH — SIGNIFICANT CHANGE UP (ref 7.32–7.43)
PH UR: 6 — SIGNIFICANT CHANGE UP (ref 4.6–8)
PLATELET # BLD AUTO: 319 K/UL — SIGNIFICANT CHANGE UP (ref 150–400)
PMV BLD: 10.4 FL — SIGNIFICANT CHANGE UP (ref 7–13)
PO2 BLDV: 25 MMHG — LOW (ref 35–40)
PO2 BLDV: 32 MMHG — LOW (ref 35–40)
POTASSIUM BLDV-SCNC: 3.5 MMOL/L — SIGNIFICANT CHANGE UP (ref 3.4–4.5)
POTASSIUM BLDV-SCNC: 3.8 MMOL/L — SIGNIFICANT CHANGE UP (ref 3.4–4.5)
POTASSIUM SERPL-MCNC: 4 MMOL/L — SIGNIFICANT CHANGE UP (ref 3.5–5.3)
POTASSIUM SERPL-SCNC: 4 MMOL/L — SIGNIFICANT CHANGE UP (ref 3.5–5.3)
PROT SERPL-MCNC: 7.4 G/DL — SIGNIFICANT CHANGE UP (ref 6–8.3)
PROT UR-MCNC: 300 — HIGH
RBC # BLD: 4.65 M/UL — SIGNIFICANT CHANGE UP (ref 3.8–5.2)
RBC # FLD: 13.2 % — SIGNIFICANT CHANGE UP (ref 10.3–14.5)
RBC CASTS # UR COMP ASSIST: SIGNIFICANT CHANGE UP (ref 0–?)
SAO2 % BLDV: 34.9 % — LOW (ref 60–85)
SAO2 % BLDV: 49.5 % — LOW (ref 60–85)
SODIUM SERPL-SCNC: 142 MMOL/L — SIGNIFICANT CHANGE UP (ref 135–145)
SP GR SPEC: 1.02 — SIGNIFICANT CHANGE UP (ref 1–1.03)
SQUAMOUS # UR AUTO: SIGNIFICANT CHANGE UP
UROBILINOGEN FLD QL: 1 E.U. — SIGNIFICANT CHANGE UP (ref 0.1–0.2)
VALPROATE SERPL-MCNC: 49.6 UG/ML — LOW (ref 50–100)
WBC # BLD: 10.17 K/UL — SIGNIFICANT CHANGE UP (ref 3.8–10.5)
WBC # FLD AUTO: 10.17 K/UL — SIGNIFICANT CHANGE UP (ref 3.8–10.5)
WBC UR QL: >50 — HIGH (ref 0–?)

## 2017-06-07 PROCEDURE — 99223 1ST HOSP IP/OBS HIGH 75: CPT | Mod: GC

## 2017-06-07 RX ORDER — SODIUM CHLORIDE 9 MG/ML
1000 INJECTION INTRAMUSCULAR; INTRAVENOUS; SUBCUTANEOUS ONCE
Qty: 0 | Refills: 0 | Status: COMPLETED | OUTPATIENT
Start: 2017-06-07 | End: 2017-06-07

## 2017-06-07 RX ORDER — AMLODIPINE BESYLATE 2.5 MG/1
1 TABLET ORAL
Qty: 0 | Refills: 0 | COMMUNITY

## 2017-06-07 RX ORDER — CARVEDILOL PHOSPHATE 80 MG/1
25 CAPSULE, EXTENDED RELEASE ORAL DAILY
Qty: 0 | Refills: 0 | Status: DISCONTINUED | OUTPATIENT
Start: 2017-06-07 | End: 2017-06-08

## 2017-06-07 RX ORDER — CEFTRIAXONE 500 MG/1
1 INJECTION, POWDER, FOR SOLUTION INTRAMUSCULAR; INTRAVENOUS EVERY 24 HOURS
Qty: 0 | Refills: 0 | Status: DISCONTINUED | OUTPATIENT
Start: 2017-06-08 | End: 2017-06-08

## 2017-06-07 RX ORDER — CEPHALEXIN 500 MG
500 CAPSULE ORAL ONCE
Qty: 0 | Refills: 0 | Status: COMPLETED | OUTPATIENT
Start: 2017-06-07 | End: 2017-06-07

## 2017-06-07 RX ORDER — ATORVASTATIN CALCIUM 80 MG/1
20 TABLET, FILM COATED ORAL AT BEDTIME
Qty: 0 | Refills: 0 | Status: DISCONTINUED | OUTPATIENT
Start: 2017-06-07 | End: 2017-06-09

## 2017-06-07 RX ORDER — QUETIAPINE FUMARATE 200 MG/1
400 TABLET, FILM COATED ORAL AT BEDTIME
Qty: 0 | Refills: 0 | Status: DISCONTINUED | OUTPATIENT
Start: 2017-06-07 | End: 2017-06-09

## 2017-06-07 RX ORDER — DEXTROSE 50 % IN WATER 50 %
25 SYRINGE (ML) INTRAVENOUS ONCE
Qty: 0 | Refills: 0 | Status: DISCONTINUED | OUTPATIENT
Start: 2017-06-07 | End: 2017-06-09

## 2017-06-07 RX ORDER — DEXTROSE 50 % IN WATER 50 %
12.5 SYRINGE (ML) INTRAVENOUS ONCE
Qty: 0 | Refills: 0 | Status: DISCONTINUED | OUTPATIENT
Start: 2017-06-07 | End: 2017-06-09

## 2017-06-07 RX ORDER — CARVEDILOL PHOSPHATE 80 MG/1
1 CAPSULE, EXTENDED RELEASE ORAL
Qty: 0 | Refills: 0 | COMMUNITY

## 2017-06-07 RX ORDER — INSULIN LISPRO 100/ML
VIAL (ML) SUBCUTANEOUS AT BEDTIME
Qty: 0 | Refills: 0 | Status: DISCONTINUED | OUTPATIENT
Start: 2017-06-07 | End: 2017-06-09

## 2017-06-07 RX ORDER — LACTOBACILLUS ACIDOPHILUS 100MM CELL
1 CAPSULE ORAL
Qty: 0 | Refills: 0 | Status: DISCONTINUED | OUTPATIENT
Start: 2017-06-07 | End: 2017-06-08

## 2017-06-07 RX ORDER — FLUTICASONE PROPIONATE 50 MCG
1 SPRAY, SUSPENSION NASAL
Qty: 0 | Refills: 0 | COMMUNITY

## 2017-06-07 RX ORDER — DIVALPROEX SODIUM 500 MG/1
500 TABLET, DELAYED RELEASE ORAL AT BEDTIME
Qty: 0 | Refills: 0 | Status: DISCONTINUED | OUTPATIENT
Start: 2017-06-07 | End: 2017-06-09

## 2017-06-07 RX ORDER — INSULIN LISPRO 100/ML
VIAL (ML) SUBCUTANEOUS
Qty: 0 | Refills: 0 | Status: DISCONTINUED | OUTPATIENT
Start: 2017-06-07 | End: 2017-06-09

## 2017-06-07 RX ORDER — SODIUM CHLORIDE 9 MG/ML
1000 INJECTION, SOLUTION INTRAVENOUS
Qty: 0 | Refills: 0 | Status: DISCONTINUED | OUTPATIENT
Start: 2017-06-07 | End: 2017-06-09

## 2017-06-07 RX ORDER — GLUCAGON INJECTION, SOLUTION 0.5 MG/.1ML
1 INJECTION, SOLUTION SUBCUTANEOUS ONCE
Qty: 0 | Refills: 0 | Status: DISCONTINUED | OUTPATIENT
Start: 2017-06-07 | End: 2017-06-09

## 2017-06-07 RX ORDER — ENOXAPARIN SODIUM 100 MG/ML
40 INJECTION SUBCUTANEOUS EVERY 24 HOURS
Qty: 0 | Refills: 0 | Status: DISCONTINUED | OUTPATIENT
Start: 2017-06-07 | End: 2017-06-08

## 2017-06-07 RX ORDER — HYDROCORTISONE 1 %
1 OINTMENT (GRAM) TOPICAL
Qty: 0 | Refills: 0 | COMMUNITY

## 2017-06-07 RX ORDER — ACETAMINOPHEN 500 MG
650 TABLET ORAL EVERY 6 HOURS
Qty: 0 | Refills: 0 | Status: DISCONTINUED | OUTPATIENT
Start: 2017-06-07 | End: 2017-06-09

## 2017-06-07 RX ORDER — ACETAMINOPHEN 500 MG
650 TABLET ORAL EVERY 6 HOURS
Qty: 0 | Refills: 0 | Status: DISCONTINUED | OUTPATIENT
Start: 2017-06-07 | End: 2017-06-07

## 2017-06-07 RX ORDER — ACYCLOVIR SODIUM 500 MG
400 VIAL (EA) INTRAVENOUS
Qty: 0 | Refills: 0 | Status: DISCONTINUED | OUTPATIENT
Start: 2017-06-07 | End: 2017-06-09

## 2017-06-07 RX ORDER — SODIUM CHLORIDE 9 MG/ML
1000 INJECTION INTRAMUSCULAR; INTRAVENOUS; SUBCUTANEOUS
Qty: 0 | Refills: 0 | Status: DISCONTINUED | OUTPATIENT
Start: 2017-06-07 | End: 2017-06-09

## 2017-06-07 RX ORDER — DEXTROSE 50 % IN WATER 50 %
1 SYRINGE (ML) INTRAVENOUS ONCE
Qty: 0 | Refills: 0 | Status: DISCONTINUED | OUTPATIENT
Start: 2017-06-07 | End: 2017-06-09

## 2017-06-07 RX ORDER — OMEPRAZOLE 10 MG/1
1 CAPSULE, DELAYED RELEASE ORAL
Qty: 0 | Refills: 0 | COMMUNITY

## 2017-06-07 RX ADMIN — SODIUM CHLORIDE 1000 MILLILITER(S): 9 INJECTION INTRAMUSCULAR; INTRAVENOUS; SUBCUTANEOUS at 17:07

## 2017-06-07 RX ADMIN — DIVALPROEX SODIUM 500 MILLIGRAM(S): 500 TABLET, DELAYED RELEASE ORAL at 22:52

## 2017-06-07 RX ADMIN — ATORVASTATIN CALCIUM 20 MILLIGRAM(S): 80 TABLET, FILM COATED ORAL at 22:52

## 2017-06-07 RX ADMIN — Medication 400 MILLIGRAM(S): at 22:52

## 2017-06-07 RX ADMIN — SODIUM CHLORIDE 1000 MILLILITER(S): 9 INJECTION INTRAMUSCULAR; INTRAVENOUS; SUBCUTANEOUS at 19:41

## 2017-06-07 RX ADMIN — SODIUM CHLORIDE 1000 MILLILITER(S): 9 INJECTION INTRAMUSCULAR; INTRAVENOUS; SUBCUTANEOUS at 17:26

## 2017-06-07 RX ADMIN — Medication 500 MILLIGRAM(S): at 19:10

## 2017-06-07 RX ADMIN — QUETIAPINE FUMARATE 400 MILLIGRAM(S): 200 TABLET, FILM COATED ORAL at 22:52

## 2017-06-07 NOTE — ED ADULT TRIAGE NOTE - CHIEF COMPLAINT QUOTE
Pt comes from [place] outpatient psych with c/o weakness, dizziness and diarrhea. Denies any pain in triage.

## 2017-06-07 NOTE — H&P ADULT - PROBLEM SELECTOR PLAN 5
BPs in acceptable range.   - Given recent diarrhea, will hold home BP meds for now and resume as BP tolerates. BPs in acceptable range.   - Given recent diarrhea, will hold home BP meds for now and resume as BP tolerates.  - Will dose Carvedilol 12.5mg  BID, monitor BP and HR BPs in acceptable range.   - Given diarrhea and dehydration will hold home Diovan for now and resume as BP tolerates.  - Will dose Carvedilol 12.5mg  BID (see H&P), monitor BP and HR

## 2017-06-07 NOTE — ED PROVIDER NOTE - OBJECTIVE STATEMENT
56 y/o female pmh htn, dm, bipolar c/o diarrhea and dizziness x today. Pt admits to having 3 episodes of watery diarrhea. Pt then went to see her psychiatrist for med adjustment. Pt took the bus home and while sitting on the bus began to feel dizziness(unable to specify lightheaded vs spinning). Symptoms lasted around 1 hour and then resolved on its own. Pt denies current dizziness. Pt admits to feeling depressed but it is chronic, denies SI or HI. Denies chest pain, sob, n/v, dysuria, numbness, tingling, weakness, syncope, fever or chills.

## 2017-06-07 NOTE — ED PROVIDER NOTE - ATTENDING CONTRIBUTION TO CARE
marilyn: pmh includes depression.  had several loose BMs today with abd pain. Sx resolved prior to ED arrival. no recent antibiotics or travel. Feels well now and is hungary.   exam: unremarkable.

## 2017-06-07 NOTE — H&P ADULT - NSHPREVIEWOFSYSTEMS_GEN_ALL_CORE
REVIEW OF SYSTEMS:    CONSTITUTIONAL: No weakness, fevers or chills  EYES/ENT: No visual changes;  No vertigo or throat pain   NECK: No pain or stiffness  RESPIRATORY: No cough, wheezing, hemoptysis; No shortness of breath  CARDIOVASCULAR: No chest pain or palpitations  GASTROINTESTINAL: +Lower abdominal pain. +Diarrhea. No nausea, vomiting, or hematemesis; No melena or hematochezia.  GENITOURINARY: No dysuria, frequency or hematuria  NEUROLOGICAL: No numbness or weakness  PSYCH: +Depression, Denies any SI/HI  SKIN: No itching, burning, rashes, or lesions   All other review of systems is negative unless indicated above.

## 2017-06-07 NOTE — H&P ADULT - ASSESSMENT
56 yo F with h/o bipolar disorder with depression (multiple inpatient hospitalizations, 1 self-aborted suicide attempt), DM2, HTN, osteoarthritis, and genital herpes presented with complaint of 1 day of lower abdominal pain, watery diarrhea, and dizziness, found to have lactic acidosis of unclear etiology. 58 yo F with h/o bipolar disorder with depression (multiple inpatient hospitalizations, 1 self-aborted suicide attempt), DM2, HTN, osteoarthritis, and genital herpes a/w diarrhea c/b dehydration, generalized weakness and lactic acidosis in the setting of Metformin regimen. Clinically improving, diarrhea seems to be in resolution;

## 2017-06-07 NOTE — H&P ADULT - NSHPSOCIALHISTORY_GEN_ALL_CORE
Tobacco: denies  EtOH: denies  Illicit drugs: denies  Lives with daughter and son. Currently umemployed.

## 2017-06-07 NOTE — H&P ADULT - MUSCULOSKELETAL
detailed exam no joint swelling/normal strength/no joint warmth/no joint erythema/no calf tenderness/ROM intact

## 2017-06-07 NOTE — H&P ADULT - PROBLEM SELECTOR PLAN 3
UA with negative nitrite, mod LE, >50 WBC, and mod bacteria, also with many squamous epithelial. Pt currently asymptomatic. No dysuria, increased urinary frequency, or urinary urgency.  - Will discontinue antibiotics for UTI and monitor for now  - F/u on results of urine cx UA with negative nitrite, mod LE, >50 WBC, and mod bacteria, also with many squamous epithelial. Pt currently asymptomatic. No dysuria, increased urinary frequency, or urinary urgency.   - Given pt's h/o poorly controlled DM2 and possible unreliable historian given psych history, will treat empirically with ceftriaxone for now.  - F/u on results of urine cx UA with negative nitrite, mod LE, >50 WBC, and mod bacteria, also with many squamous epithelial.   - Given pt's h/o poorly controlled DM2 and possible unreliable historian given psych history, will treat empirically with ceftriaxone for now.  - F/u on results of urine cx

## 2017-06-07 NOTE — ED ADULT NURSE NOTE - CHIEF COMPLAINT QUOTE
Pt comes from [place] outpatient psych with c/o weakness, dizziness and diarrhea. Denies any pain in triage.

## 2017-06-07 NOTE — H&P ADULT - HISTORY OF PRESENT ILLNESS
56 yo F with h/o bipolar disorder with depression (multiple inpatient hospitalizations, 1 self-aborted suicide attempt), DM2, HTN, osteoarthritis, and genital herpes presents from psychiatrist's office with complaint of dizziness (sensation of room spinning) while she was having diarrhea in the restroom. The dizziness lasted an hour and self-resolved when pt arrived to the ED. Pt reports that on Wednesday morning around 11am, she started having sharp, crampy lower abdominal pain, described as 9/10 in intensity, intermittent, nonradiating, and relieved with BM. Pt reports that her all her BMs today have been diarrhea, with 3 episodes of large-volume, watery, not foul-smelling, non-bloody, and non-mucusy diarrhea. Had normal BMs yesterday. Pt did not have breakfast or anything else to eat this morning prior to onset of the abdominal pain. Her last meal was on Tuesday evening ~6pm and consisted of plantains with eggs. No one else had same meal. Pt denies any associated F/C, CP, SOB, N/V, flank pain, or URI symptoms. No dysuria, increased urinary frequency, or urinary urgency. No recent antibiotic use, healthcare exposure, sick contacts, or travel. Pt states that she had both an EGD and colonoscopy ~2 years ago and both were normal.     In the ED, T 98.2, HR , -151 / 74-81, RR 18, O2 Sat 97% RA. WBC wnl at 10.17, Cl 95, HCO3 26, BUN 19, Cr 0.88. LFTs and lipase wnl. VBG lactate elevated to 5.6, trended down to 5.1 with 3L bolus of NS. UA with negative nitrite, mod LE, >50 WBC, and mod bacteria, also with many squamous epithelial. 58 yo F with h/o bipolar disorder with depression (multiple inpatient hospitalizations, 1 self-aborted suicide attempt), DM2, HTN, osteoarthritis, and genital herpes presents from psychiatrist's office with complaint of dizziness (sensation of room spinning) while she was having diarrhea in the restroom. The dizziness lasted an hour and self-resolved when pt arrived to the ED. Pt reports that on Wednesday morning around 11am, she started having sharp, crampy lower abdominal pain, described as 9/10 in intensity, intermittent, nonradiating, and relieved with BM. Pt reports that her all her BMs today have been diarrhea, with 3 episodes of large-volume, watery, not foul-smelling, non-bloody, and non-mucusy diarrhea. Had normal BMs yesterday. Pt did not have breakfast or anything else to eat this morning prior to onset of the abdominal pain. Her last meal was on Tuesday evening ~6pm and consisted of plantains with eggs. No one else had same meal. Pt did not have anything to eat after onset of her abdominal pain and diarrhea, but did have dinner while in the ED and was able to tolerate it without any issues. Pt denies any associated F/C, CP, SOB, N/V, flank pain, or URI symptoms. No dysuria, increased urinary frequency, or urinary urgency. No recent antibiotic use, healthcare exposure, sick contacts, or travel. Pt states that she had both an EGD and colonoscopy ~2 years ago and both were normal.     In the ED, T 98.2, HR , -151 / 74-81, RR 18, O2 Sat 97% RA. WBC wnl at 10.17, Cl 95, HCO3 26, BUN 19, Cr 0.88. LFTs and lipase wnl. VBG lactate elevated to 5.6, trended down to 5.1 with 3L bolus of NS. UA with negative nitrite, mod LE, >50 WBC, and mod bacteria, also with many squamous epithelial. 56 yo Female with h/o bipolar disorder with depression (multiple inpatient hospitalizations, 1 self-aborted suicide attempt), DM Type2, HTN, osteoarthritis, and genital herpes presents from psychiatrist's office with complaint of dizziness (sensation of room spinning) while she was having diarrhea in the restroom. The dizziness lasted an hour and self-resolved when pt arrived to the ED. Pt reports that on Wednesday morning around 11am, she started having sharp, crampy lower abdominal pain, described as 9/10 in intensity, intermittent, nonradiating, and relieved with BM. Pt reports that her all her BMs today have been diarrhea, with 3 episodes of large-volume, watery, not foul-smelling, non-bloody, and non-mucusy diarrhea. Had normal BMs yesterday. Pt did not have breakfast or anything else to eat this morning prior to onset of the abdominal pain. Her last meal was on Tuesday evening ~6pm and consisted of plantains with eggs. No one else had same meal. Pt did not have anything to eat after onset of her abdominal pain and diarrhea, but did have dinner while in the ED and was able to tolerate it without any issues. Pt denies any associated F/C, CP, SOB, N/V, flank pain, or URI symptoms. No dysuria, increased urinary frequency, or urinary urgency. No recent antibiotic use, healthcare exposure, sick contacts, or travel. Pt states that she had both an EGD and colonoscopy ~2 years ago and both were normal.     In the ED, T 98.2, HR , -151 / 74-81, RR 18, O2 Sat 97% RA. WBC wnl at 10.17, Cl 95, HCO3 26, BUN 19, Cr 0.88. LFTs and lipase wnl. VBG lactate elevated to 5.6, trended down to 5.1 with 3L bolus of NS. UA with negative nitrite, mod LE, >50 WBC, and mod bacteria, also with many squamous epithelial. 58 yo Female with h/o bipolar disorder with depression (multiple inpatient hospitalizations, 1 self-aborted suicide attempt), DM Type2, HTN, osteoarthritis, and genital herpes presents from psychiatrist's office with complaint of dizziness (sensation of room spinning) while she was having diarrhea in the restroom. The dizziness lasted an hour and self-resolved when pt arrived to the ED. Pt reports that on Wednesday morning around 11am, she started having sharp, crampy lower abdominal pain, described as 9/10 in intensity, intermittent, nonradiating, and relieved with BM. Pt reports that her all her BMs today have been diarrhea, with 3 episodes of large-volume, watery, not foul-smelling, non-bloody, and non-mucusy diarrhea. Had normal BMs yesterday. Pt did not have breakfast or anything else to eat this morning prior to onset of the abdominal pain. Her last meal was on Tuesday evening ~6pm and consisted of plantains with eggs. No one else had same meal. Pt did not have anything to eat after onset of her abdominal pain and diarrhea, but did have dinner while in the ED and was able to tolerate it without any issues. Pt denies any associated F/C, CP, SOB, N/V, flank pain, or URI symptoms. No dysuria, increased urinary frequency, or urinary urgency. No recent antibiotic use, healthcare exposure, sick contacts, or travel. Pt states that she had both an EGD and colonoscopy ~2 years ago and both were normal. Of note, the pt not sure if takes Carvedilol daily or BID;     In the ED, T 98.2, HR , -151 / 74-81, RR 18, O2 Sat 97% RA. WBC wnl at 10.17, Cl 95, HCO3 26, BUN 19, Cr 0.88. LFTs and lipase wnl. VBG lactate elevated to 5.6, trended down to 5.1 with 3L bolus of NS. UA with negative nitrite, mod LE, >50 WBC, and mod bacteria, also with many squamous epithelial.

## 2017-06-07 NOTE — H&P ADULT - NSHPPHYSICALEXAM_GEN_ALL_CORE
PHYSICAL EXAM:    General: No acute distress.  HEENT: Normocephalic, atraumatic.  PERRL.  EOMI.  No scleral icterus.  Moist MM.  No oropharyngeal exudates.    Neck: Supple.  Full range of motion.  No JVD.  No thyromegaly.  Trachea midline.  No lymphadenopathy.   Heart: RRR.  Normal S1 and S2.  No murmurs, rubs, or gallops.   Lungs: CTAB. No wheezes, crackles, or rhonchi.    Abdomen: BS+, soft, mild TTP at epigastrium (R>L).  No guarding or rebound tenderness.  Nondistended.  No organomegaly.  Skin: Warm and dry.  No rashes.  Extremities: No edema, clubbing, or cyanosis.  2+ peripheral pulses b/l.  Musculoskeletal: No deformities.  No spinal or paraspinal tenderness.  Neuro: No focal deficits.  Psych: Flat affect.  Mood "depressed."  Denies SI/HI.

## 2017-06-07 NOTE — H&P ADULT - PROBLEM SELECTOR PLAN 4
- Will hold home DM meds and start HISS  - Based on FS, consider adding Lantus or premeal Humalog HgbA1c 9.5% in 4/2017.  - Will hold home DM meds and start HISS  - Based on FS, consider adding Lantus or premeal Humalog

## 2017-06-07 NOTE — H&P ADULT - PROBLEM SELECTOR PLAN 6
Denies any SI/HI.   - Given pt's extensive psych history and possibility of meds/toxins contributing to elevated lactate, will obtain Psych consult in AM Denies any SI/HI.

## 2017-06-07 NOTE — H&P ADULT - NSHPLABSRESULTS_GEN_ALL_CORE
14.0   10.17 )-----------( 319      ( 2017 16:50 )             42.0     06-07    142  |  95<L>  |  19  ----------------------------<  133<H>  4.0   |  26  |  0.88    Ca    9.8      2017 16:50    TPro  7.4  /  Alb  4.1  /  TBili  0.4  /  DBili  x   /  AST  14  /  ALT  12  /  AlkPhos  73  06-07    Urinalysis Basic - ( 2017 16:50 )    Color: YELLOW / Appearance: TURBID / S.024 / pH: 6.0  Gluc: 50 / Ketone: TRACE  / Bili: SMALL / Urobili: 1 E.U.   Blood: SMALL / Protein: 300 / Nitrite: NEGATIVE   Leuk Esterase: MODERATE / RBC: 10-25 / WBC >50   Sq Epi: MANY / Non Sq Epi: x / Bacteria: MOD

## 2017-06-07 NOTE — H&P ADULT - PROBLEM SELECTOR PLAN 1
VBG lactate 5.6, improved to 5.1 with 3L bolus of NS. Unclear etiology. Likely Type B lactic acidosis and unlikely to be from inadequate oxygen delivery causing tissue hypoperfusion. Low suspicion for mesenteric ischemia causing abdominal pain with diarrhea. Pt nontoxic appearing. Can be from use of metformin and depakote.   - Check VBG lactate in the AM  - Pt s/p 3L bolus of NS; c/w IVF with NS at 75 cc/hr  - Hold metformin VBG lactate 5.6, improved to 5.1 with 3L bolus of NS. Unclear etiology. Can be from tissue hypoperfusion in setting of diarrhea. Low suspicion for mesenteric infarction as pt nontoxic appearing and HD stable. Can also be from medications (metformin, depakote) vs. toxins (given pt's psych history) vs. CO poisoning.  - Check VBG lactate in the AM  - Pt s/p 3L bolus of NS; c/w IVF with NS at 75 cc/hr  - Hold metformin but c/w depakote for now. Will obtain Psych consult in AM.  - Check urine and serum tox screens and carboxyhemoglobin VBG lactate 5.6, improved to 5.1 with 3L bolus of NS. Unclear etiology. Type B lactic acidosis. Low suspicion for tissue hypoperfusion in setting of diarrhea and for mesenteric infarction as pt nontoxic appearing and HD stable. Can also be from medications (metformin, depakote) vs. toxins (given pt's psych history) vs. CO poisoning.  - Check VBG lactate in the AM  - Pt s/p 3L bolus of NS; c/w IVF with NS at 75 cc/hr  - Hold metformin but c/w depakote for now. Will obtain Psych consult in AM.  - Check urine and serum tox screens and carboxyhemoglobin VBG lactate 5.6, improved to 5.1 with 3L bolus of NS. Unclear etiology. Type B lactic acidosis. Low suspicion for tissue hypoperfusion. The pt is on metformin.  - Check VBG lactate in the AM  - Pt s/p 3L bolus of NS; c/w IVF with NS at 75 cc/hr  - Hold metformin  - Safety of Metformin regimen discussed with the pt   - Check urine and serum tox screens

## 2017-06-07 NOTE — H&P ADULT - PROBLEM SELECTOR PLAN 2
Associated with watery diarrhea. Can be infectious (gastroenteritis vs. C diff colitis) or as a result of pt's elevated lactate. Pt nontoxic appearing, afebrile, and HD stable.  - Will check C diff, stool cxs, O&P.  - Pt s/p 3L bolus of NS; c/w IVF with NS at 75 cc/hr  - Pt able to tolerate PO intake. Had dinner without issues. Will start pt on carb consistent/DASH/TLC diet. Associated with watery diarrhea. Can be infectious (gastroenteritis vs. C diff colitis) or as a result of pt's elevated lactate. Pt nontoxic appearing, afebrile, and HD stable.  - Will check stool cxs and O&P. Consider C diff if watery diarrhea continues.  - Pt s/p 3L bolus of NS; c/w IVF with NS at 75 cc/hr  - Pt able to tolerate PO intake. Had dinner without issues. Will start pt on carb consistent/DASH/TLC diet.

## 2017-06-07 NOTE — ED ADULT NURSE NOTE - OBJECTIVE STATEMENT
Pt received in spot 8. Alert and oriented x3. Ambulatory. Co dizziness and nausea this am after outpatient psych appt. Denies syncopal episode, falls. Denies n/v, chest pain, sob, fevers, chills. Labs sent. IV placed. VS as stated. NSR on cm. Comfort measures provided. Will continue to monitor. Pt received in spot 8. Alert and oriented x3. Ambulatory. Hx of bipolar, dm, and htn. Denies SI or HI.  Co dizziness and nausea this am after outpatient psych appt. Denies syncopal episode, falls. Denies n/v, chest pain, sob, fevers, chills. Denies any dizziness at current time. Labs sent. IV placed. VS as stated. NSR on cm. Comfort measures provided. Will continue to monitor. Pt received in spot 8. Alert and oriented x3. Ambulatory. Hx of bipolar, dm, and htn. Denies SI or HI.  Co dizziness and diarrhea this am after outpatient psych appt. Admits to having 3 episodes of diarrhea. Denies recent antibiotic use. Denies syncopal episode, falls. Denies n/v, chest pain, sob, fevers, chills. Denies any dizziness at current time. Labs sent. IV placed. VS as stated. NSR on cm. Comfort measures provided. Will continue to monitor.

## 2017-06-07 NOTE — H&P ADULT - LYMPHATIC
supraclavicular L/anterior cervical L/supraclavicular R/posterior cervical L/posterior cervical R/anterior cervical R

## 2017-06-08 DIAGNOSIS — E11.9 TYPE 2 DIABETES MELLITUS WITHOUT COMPLICATIONS: ICD-10-CM

## 2017-06-08 DIAGNOSIS — F31.9 BIPOLAR DISORDER, UNSPECIFIED: ICD-10-CM

## 2017-06-08 DIAGNOSIS — R82.71 BACTERIURIA: ICD-10-CM

## 2017-06-08 DIAGNOSIS — E11.65 TYPE 2 DIABETES MELLITUS WITH HYPERGLYCEMIA: ICD-10-CM

## 2017-06-08 DIAGNOSIS — R19.7 DIARRHEA, UNSPECIFIED: ICD-10-CM

## 2017-06-08 DIAGNOSIS — N39.0 URINARY TRACT INFECTION, SITE NOT SPECIFIED: ICD-10-CM

## 2017-06-08 DIAGNOSIS — Z29.9 ENCOUNTER FOR PROPHYLACTIC MEASURES, UNSPECIFIED: ICD-10-CM

## 2017-06-08 LAB
ALBUMIN SERPL ELPH-MCNC: 3.4 G/DL — SIGNIFICANT CHANGE UP (ref 3.3–5)
ALP SERPL-CCNC: 60 U/L — SIGNIFICANT CHANGE UP (ref 40–120)
ALT FLD-CCNC: 11 U/L — SIGNIFICANT CHANGE UP (ref 4–33)
AMPHET UR-MCNC: NEGATIVE — SIGNIFICANT CHANGE UP
APAP SERPL-MCNC: < 15 UG/ML — LOW (ref 15–25)
AST SERPL-CCNC: 13 U/L — SIGNIFICANT CHANGE UP (ref 4–32)
BARBITURATES MEASUREMENT: NEGATIVE — SIGNIFICANT CHANGE UP
BARBITURATES UR SCN-MCNC: NEGATIVE — SIGNIFICANT CHANGE UP
BASE EXCESS BLDV CALC-SCNC: 5.2 MMOL/L — SIGNIFICANT CHANGE UP
BENZODIAZ SERPL-MCNC: NEGATIVE — SIGNIFICANT CHANGE UP
BENZODIAZ UR-MCNC: NEGATIVE — SIGNIFICANT CHANGE UP
BILIRUB SERPL-MCNC: 0.3 MG/DL — SIGNIFICANT CHANGE UP (ref 0.2–1.2)
BLOOD GAS VENOUS - CREATININE: 0.42 MG/DL — LOW (ref 0.5–1.3)
BUN SERPL-MCNC: 12 MG/DL — SIGNIFICANT CHANGE UP (ref 7–23)
BUN SERPL-MCNC: 14 MG/DL — SIGNIFICANT CHANGE UP (ref 7–23)
CALCIUM SERPL-MCNC: 8.6 MG/DL — SIGNIFICANT CHANGE UP (ref 8.4–10.5)
CALCIUM SERPL-MCNC: 8.7 MG/DL — SIGNIFICANT CHANGE UP (ref 8.4–10.5)
CANNABINOIDS UR-MCNC: NEGATIVE — SIGNIFICANT CHANGE UP
CHLORIDE BLDV-SCNC: 105 MMOL/L — SIGNIFICANT CHANGE UP (ref 96–108)
CHLORIDE SERPL-SCNC: 100 MMOL/L — SIGNIFICANT CHANGE UP (ref 98–107)
CHLORIDE SERPL-SCNC: 99 MMOL/L — SIGNIFICANT CHANGE UP (ref 98–107)
CO2 SERPL-SCNC: 28 MMOL/L — SIGNIFICANT CHANGE UP (ref 22–31)
CO2 SERPL-SCNC: 29 MMOL/L — SIGNIFICANT CHANGE UP (ref 22–31)
COCAINE METAB.OTHER UR-MCNC: NEGATIVE — SIGNIFICANT CHANGE UP
CREAT SERPL-MCNC: 0.45 MG/DL — LOW (ref 0.5–1.3)
CREAT SERPL-MCNC: 0.53 MG/DL — SIGNIFICANT CHANGE UP (ref 0.5–1.3)
ETHANOL BLD-MCNC: < 10 MG/DL — SIGNIFICANT CHANGE UP
GAS PNL BLDV: 138 MMOL/L — SIGNIFICANT CHANGE UP (ref 136–146)
GLUCOSE BLDV-MCNC: 170 — HIGH (ref 70–99)
GLUCOSE SERPL-MCNC: 163 MG/DL — HIGH (ref 70–99)
GLUCOSE SERPL-MCNC: 164 MG/DL — HIGH (ref 70–99)
HBA1C BLD-MCNC: 8.5 % — HIGH (ref 4–5.6)
HCO3 BLDV-SCNC: 28 MMOL/L — HIGH (ref 20–27)
HCT VFR BLD CALC: 37 % — SIGNIFICANT CHANGE UP (ref 34.5–45)
HCT VFR BLDV CALC: 43.9 % — SIGNIFICANT CHANGE UP (ref 34.5–45)
HGB BLD-MCNC: 12 G/DL — SIGNIFICANT CHANGE UP (ref 11.5–15.5)
HGB BLDV-MCNC: 14.3 G/DL — SIGNIFICANT CHANGE UP (ref 11.5–15.5)
LACTATE BLDV-MCNC: 2.7 MMOL/L — HIGH (ref 0.5–2)
LACTATE SERPL-SCNC: 2.7 MMOL/L — HIGH (ref 0.5–2)
MAGNESIUM SERPL-MCNC: 1.4 MG/DL — LOW (ref 1.6–2.6)
MCHC RBC-ENTMCNC: 29.6 PG — SIGNIFICANT CHANGE UP (ref 27–34)
MCHC RBC-ENTMCNC: 32.4 % — SIGNIFICANT CHANGE UP (ref 32–36)
MCV RBC AUTO: 91.1 FL — SIGNIFICANT CHANGE UP (ref 80–100)
METHADONE UR-MCNC: NEGATIVE — SIGNIFICANT CHANGE UP
OPIATES UR-MCNC: NEGATIVE — SIGNIFICANT CHANGE UP
OXYCODONE UR-MCNC: NEGATIVE — SIGNIFICANT CHANGE UP
PCO2 BLDV: 56 MMHG — HIGH (ref 41–51)
PCP UR-MCNC: NEGATIVE — SIGNIFICANT CHANGE UP
PH BLDV: 7.36 PH — SIGNIFICANT CHANGE UP (ref 7.32–7.43)
PHOSPHATE SERPL-MCNC: 3.7 MG/DL — SIGNIFICANT CHANGE UP (ref 2.5–4.5)
PLATELET # BLD AUTO: 281 K/UL — SIGNIFICANT CHANGE UP (ref 150–400)
PMV BLD: 10.4 FL — SIGNIFICANT CHANGE UP (ref 7–13)
PO2 BLDV: 66 MMHG — HIGH (ref 35–40)
POTASSIUM BLDV-SCNC: 3.1 MMOL/L — LOW (ref 3.4–4.5)
POTASSIUM SERPL-MCNC: 3.4 MMOL/L — LOW (ref 3.5–5.3)
POTASSIUM SERPL-MCNC: 3.8 MMOL/L — SIGNIFICANT CHANGE UP (ref 3.5–5.3)
POTASSIUM SERPL-SCNC: 3.4 MMOL/L — LOW (ref 3.5–5.3)
POTASSIUM SERPL-SCNC: 3.8 MMOL/L — SIGNIFICANT CHANGE UP (ref 3.5–5.3)
PROT SERPL-MCNC: 6.1 G/DL — SIGNIFICANT CHANGE UP (ref 6–8.3)
RBC # BLD: 4.06 M/UL — SIGNIFICANT CHANGE UP (ref 3.8–5.2)
RBC # FLD: 13.3 % — SIGNIFICANT CHANGE UP (ref 10.3–14.5)
SALICYLATES SERPL-MCNC: < 5 MG/DL — LOW (ref 15–30)
SAO2 % BLDV: 91 % — HIGH (ref 60–85)
SODIUM SERPL-SCNC: 142 MMOL/L — SIGNIFICANT CHANGE UP (ref 135–145)
SODIUM SERPL-SCNC: 142 MMOL/L — SIGNIFICANT CHANGE UP (ref 135–145)
WBC # BLD: 7.96 K/UL — SIGNIFICANT CHANGE UP (ref 3.8–10.5)
WBC # FLD AUTO: 7.96 K/UL — SIGNIFICANT CHANGE UP (ref 3.8–10.5)

## 2017-06-08 PROCEDURE — 99233 SBSQ HOSP IP/OBS HIGH 50: CPT | Mod: GC

## 2017-06-08 PROCEDURE — 93010 ELECTROCARDIOGRAM REPORT: CPT

## 2017-06-08 RX ORDER — SODIUM CHLORIDE 9 MG/ML
1000 INJECTION INTRAMUSCULAR; INTRAVENOUS; SUBCUTANEOUS ONCE
Qty: 0 | Refills: 0 | Status: COMPLETED | OUTPATIENT
Start: 2017-06-08 | End: 2017-06-08

## 2017-06-08 RX ORDER — POTASSIUM CHLORIDE 20 MEQ
40 PACKET (EA) ORAL ONCE
Qty: 0 | Refills: 0 | Status: COMPLETED | OUTPATIENT
Start: 2017-06-08 | End: 2017-06-08

## 2017-06-08 RX ORDER — MAGNESIUM SULFATE 500 MG/ML
1 VIAL (ML) INJECTION ONCE
Qty: 0 | Refills: 0 | Status: COMPLETED | OUTPATIENT
Start: 2017-06-08 | End: 2017-06-08

## 2017-06-08 RX ORDER — CARVEDILOL PHOSPHATE 80 MG/1
12.5 CAPSULE, EXTENDED RELEASE ORAL EVERY 12 HOURS
Qty: 0 | Refills: 0 | Status: DISCONTINUED | OUTPATIENT
Start: 2017-06-08 | End: 2017-06-09

## 2017-06-08 RX ADMIN — ENOXAPARIN SODIUM 40 MILLIGRAM(S): 100 INJECTION SUBCUTANEOUS at 05:29

## 2017-06-08 RX ADMIN — Medication 400 MILLIGRAM(S): at 17:26

## 2017-06-08 RX ADMIN — Medication 1: at 17:25

## 2017-06-08 RX ADMIN — SODIUM CHLORIDE 75 MILLILITER(S): 9 INJECTION INTRAMUSCULAR; INTRAVENOUS; SUBCUTANEOUS at 00:08

## 2017-06-08 RX ADMIN — Medication 400 MILLIGRAM(S): at 05:28

## 2017-06-08 RX ADMIN — ATORVASTATIN CALCIUM 20 MILLIGRAM(S): 80 TABLET, FILM COATED ORAL at 21:34

## 2017-06-08 RX ADMIN — DIVALPROEX SODIUM 500 MILLIGRAM(S): 500 TABLET, DELAYED RELEASE ORAL at 21:34

## 2017-06-08 RX ADMIN — Medication 40 MILLIEQUIVALENT(S): at 08:58

## 2017-06-08 RX ADMIN — SODIUM CHLORIDE 2000 MILLILITER(S): 9 INJECTION INTRAMUSCULAR; INTRAVENOUS; SUBCUTANEOUS at 10:58

## 2017-06-08 RX ADMIN — Medication 100 GRAM(S): at 08:58

## 2017-06-08 RX ADMIN — Medication 1: at 13:12

## 2017-06-08 RX ADMIN — QUETIAPINE FUMARATE 400 MILLIGRAM(S): 200 TABLET, FILM COATED ORAL at 21:34

## 2017-06-08 RX ADMIN — CARVEDILOL PHOSPHATE 12.5 MILLIGRAM(S): 80 CAPSULE, EXTENDED RELEASE ORAL at 05:28

## 2017-06-08 RX ADMIN — CARVEDILOL PHOSPHATE 12.5 MILLIGRAM(S): 80 CAPSULE, EXTENDED RELEASE ORAL at 17:26

## 2017-06-08 RX ADMIN — CEFTRIAXONE 100 GRAM(S): 500 INJECTION, POWDER, FOR SOLUTION INTRAMUSCULAR; INTRAVENOUS at 05:28

## 2017-06-08 RX ADMIN — SODIUM CHLORIDE 1000 MILLILITER(S): 9 INJECTION INTRAMUSCULAR; INTRAVENOUS; SUBCUTANEOUS at 16:21

## 2017-06-08 NOTE — PROGRESS NOTE ADULT - ASSESSMENT
57F with history of DM2, HTN and BPAD presents for acute onset diarrheal stools a/w abdominal pain found with lactic acidosis likely secondary to acute gastroenteritis

## 2017-06-08 NOTE — PROGRESS NOTE ADULT - PROBLEM SELECTOR PLAN 1
- acute onset starting night prior to admission   - send stool O/P & culture  - supportive care, IV fluid hydration - acute onset starting night prior to admission, a/w crampy abdominal pain - appears to be self-resolving  - afebrile, no leukocytosis, VSS - low concern for sepsis  - send stool O/P & culture  - supportive care with IV fluid hydration, Immodium PRN

## 2017-06-08 NOTE — PROGRESS NOTE ADULT - PROBLEM SELECTOR PLAN 3
- A1c 8.4% on home metformin 1000 BID, glimeperide, Januvia  - cont low-intensity Humalog SS  - CC diet - grossly positive UA with moderate bacteria  - patient denies symptoms such as dysuria but endorses some suprapubic TTP  - s/p ceftriaxone 1 g x1  - will discontinue antibiotics now, monitor for symptoms

## 2017-06-08 NOTE — PROGRESS NOTE ADULT - PROBLEM SELECTOR PLAN 2
- grossly positive UA with moderate bacteria  - patient denies symptoms such as dysuria but endorses some suprapubic TTP  - s/p ceftriaxone 1 g x1  - will discontinue antibiotics now, monitor for symptoms - admission lactate 5.5 -->5.1 --> 2.7 s/p 3L IV fluids  - likely drug-induced (ie.. metformin) vs. dehydration in setting of gastroenteritis  - will bolus 1L 0.9NS  - repeat lactate and BMP after IVF bolus, replete lytes as necessary - admission lactate 5.5 -->5.1 --> 2.7 s/p 3L IV fluids  - likely drug-induced (i.e. metformin) vs. dehydration in setting of acute gastroenteritis  - will bolus 1L 0.9NS  - will repeat lactate and BMP after IVF bolus, replete lytes

## 2017-06-08 NOTE — PROGRESS NOTE ADULT - PROBLEM SELECTOR PLAN 4
- A1c 8.4% on home metformin 1000 BID, glimeperide, Januvia  - cont low-intensity Humalog SS  - CC diet

## 2017-06-08 NOTE — PROGRESS NOTE ADULT - SUBJECTIVE AND OBJECTIVE BOX
Patient is a 57y old  Female who presents with a chief complaint of Abdominal pain, diarrhea, and dizziness (2017 22:12)      SUBJECTIVE / OVERNIGHT EVENTS:    MEDICATIONS  (STANDING):  diVALproex ER 500milliGRAM(s) Oral at bedtime  atorvastatin 20milliGRAM(s) Oral at bedtime  QUEtiapine 400milliGRAM(s) Oral at bedtime  acyclovir   Tablet 400milliGRAM(s) Oral two times a day  enoxaparin Injectable 40milliGRAM(s) SubCutaneous every 24 hours  sodium chloride 0.9%. 1000milliLiter(s) IV Continuous <Continuous>  insulin lispro (HumaLOG) corrective regimen sliding scale  SubCutaneous three times a day before meals  insulin lispro (HumaLOG) corrective regimen sliding scale  SubCutaneous at bedtime  dextrose 5%. 1000milliLiter(s) IV Continuous <Continuous>  dextrose 50% Injectable 12.5Gram(s) IV Push once  dextrose 50% Injectable 25Gram(s) IV Push once  dextrose 50% Injectable 25Gram(s) IV Push once  cefTRIAXone   IVPB 1Gram(s) IV Intermittent every 24 hours  carvedilol 12.5milliGRAM(s) Oral every 12 hours    MEDICATIONS  (PRN):  dextrose Gel 1Dose(s) Oral once PRN Blood Glucose LESS THAN 70 milliGRAM(s)/deciliter  glucagon  Injectable 1milliGRAM(s) IntraMuscular once PRN Glucose LESS THAN 70 milligrams/deciliter  acetaminophen   Tablet. 650milliGRAM(s) Oral every 6 hours PRN Mild to Moderate Pain      Vital Signs Last 24 Hrs  T(C): 36.8, Max: 36.9 (- @ 20:43)  HR: 74 (74 - 105)  BP: 121/77 (113/74 - 151/81)  RR: 17 (17 - 19)  SpO2: 100% (94% - 100%)  Wt(kg): --  CAPILLARY BLOOD GLUCOSE  148 (2017 08:56)  144 (2017 23:38)    I&O's Summary      PHYSICAL EXAM:  GENERAL: NAD, well-developed  HEAD:  Atraumatic, Normocephalic  EYES: EOMI, PERRLA, conjunctiva and sclera clear  NECK: Supple, No JVD  CHEST/LUNG: Clear to auscultation bilaterally; No wheeze  HEART: Regular rate and rhythm; No murmurs, rubs, or gallops  ABDOMEN: Soft, Nontender, Nondistended; Bowel sounds present  EXTREMITIES:  2+ Peripheral Pulses, No clubbing, cyanosis, or edema  PSYCH: AAOx3  NEUROLOGY: non-focal  SKIN: No rashes or lesions    LABS:                        12.0   7.96  )-----------( 281      ( 2017 07:42 )             37.0     06-08    142  |  99  |  14  ----------------------------<  164<H>  3.4<L>   |  28  |  0.53    Ca    8.6      2017 07:42  Phos  3.7     06-08  Mg     1.4     06-08    TPro  6.1  /  Alb  3.4  /  TBili  0.3  /  DBili  x   /  AST  13  /  ALT  11  /  AlkPhos  60  06-08          Urinalysis Basic - ( 2017 16:50 )    Color: YELLOW / Appearance: TURBID / S.024 / pH: 6.0  Gluc: 50 / Ketone: TRACE  / Bili: SMALL / Urobili: 1 E.U.   Blood: SMALL / Protein: 300 / Nitrite: NEGATIVE   Leuk Esterase: MODERATE / RBC: 10-25 / WBC >50   Sq Epi: MANY / Non Sq Epi: x / Bacteria: MOD        RADIOLOGY & ADDITIONAL TESTS:    Imaging Personally Reviewed:    Consultant(s) Notes Reviewed:      Care Discussed with Consultants/Other Providers: Patient is a 57y old  Female who presents with a chief complaint of Abdominal pain, diarrhea, and dizziness (2017 22:12)      SUBJECTIVE / OVERNIGHT EVENTS:        MEDICATIONS  (STANDING):  diVALproex ER 500milliGRAM(s) Oral at bedtime  atorvastatin 20milliGRAM(s) Oral at bedtime  QUEtiapine 400milliGRAM(s) Oral at bedtime  acyclovir   Tablet 400milliGRAM(s) Oral two times a day  enoxaparin Injectable 40milliGRAM(s) SubCutaneous every 24 hours  sodium chloride 0.9%. 1000milliLiter(s) IV Continuous <Continuous>  insulin lispro (HumaLOG) corrective regimen sliding scale  SubCutaneous three times a day before meals  insulin lispro (HumaLOG) corrective regimen sliding scale  SubCutaneous at bedtime  dextrose 5%. 1000milliLiter(s) IV Continuous <Continuous>  dextrose 50% Injectable 12.5Gram(s) IV Push once  dextrose 50% Injectable 25Gram(s) IV Push once  dextrose 50% Injectable 25Gram(s) IV Push once  cefTRIAXone   IVPB 1Gram(s) IV Intermittent every 24 hours  carvedilol 12.5milliGRAM(s) Oral every 12 hours    MEDICATIONS  (PRN):  dextrose Gel 1Dose(s) Oral once PRN Blood Glucose LESS THAN 70 milliGRAM(s)/deciliter  glucagon  Injectable 1milliGRAM(s) IntraMuscular once PRN Glucose LESS THAN 70 milligrams/deciliter  acetaminophen   Tablet. 650milliGRAM(s) Oral every 6 hours PRN Mild to Moderate Pain      Vital Signs Last 24 Hrs  T(C): 36.8, Max: 36.9 (- @ 20:43)  HR: 74 (74 - 105)  BP: 121/77 (113/74 - 151/81)  RR: 17 (17 - 19)  SpO2: 100% (94% - 100%)  Wt(kg): --  CAPILLARY BLOOD GLUCOSE  148 (2017 08:56)  144 (2017 23:38)    I&O's Summary      PHYSICAL EXAM:  GENERAL: NAD, well-developed  HEAD:  Atraumatic, Normocephalic  EYES: EOMI, PERRLA, conjunctiva and sclera clear  NECK: Supple, No JVD  CHEST/LUNG: Clear to auscultation bilaterally; No wheeze  HEART: Regular rate and rhythm; No murmurs, rubs, or gallops  ABDOMEN: Soft, Nontender, Nondistended; Bowel sounds present  EXTREMITIES:  2+ Peripheral Pulses, No clubbing, cyanosis, or edema  PSYCH: AAOx3  NEUROLOGY: non-focal  SKIN: No rashes or lesions    LABS:                        12.0   7.96  )-----------( 281      ( 2017 07:42 )             37.0     06-08    142  |  99  |  14  ----------------------------<  164<H>  3.4<L>   |  28  |  0.53    Ca    8.6      2017 07:42  Phos  3.7     06-08  Mg     1.4     06-08    TPro  6.1  /  Alb  3.4  /  TBili  0.3  /  DBili  x   /  AST  13  /  ALT  11  /  AlkPhos  60  06-08          Urinalysis Basic - ( 2017 16:50 )    Color: YELLOW / Appearance: TURBID / S.024 / pH: 6.0  Gluc: 50 / Ketone: TRACE  / Bili: SMALL / Urobili: 1 E.U.   Blood: SMALL / Protein: 300 / Nitrite: NEGATIVE   Leuk Esterase: MODERATE / RBC: 10-25 / WBC >50   Sq Epi: MANY / Non Sq Epi: x / Bacteria: MOD        RADIOLOGY & ADDITIONAL TESTS:    Imaging Personally Reviewed:    Consultant(s) Notes Reviewed:      Care Discussed with Consultants/Other Providers: Patient is a 57y old  Female who presents with a chief complaint of Abdominal pain, diarrhea, and dizziness (2017 22:12)      SUBJECTIVE / OVERNIGHT EVENTS:    Patient states abdominal pain has resolved overnight; no further episodes of diarrhea, denies N/V, has an appetite would like to eat solid food. She endorses depressed mood for the last 2-3 months but denies previous suicide attempts and suicidal ideations at this time. Unclear whether she follows with Psychiatry vs. Behavioral Therapy however patient states she was advised to go to ED by her Psychiatrist vs. Therapist and an EMS was called. No history of fever, chills, night sweats, back/flank pain, hematuria, dysuria or hematuria; sick contacts, recent antibiotic course,     MEDICATIONS  (STANDING):  diVALproex ER 500milliGRAM(s) Oral at bedtime  atorvastatin 20milliGRAM(s) Oral at bedtime  QUEtiapine 400milliGRAM(s) Oral at bedtime  acyclovir   Tablet 400milliGRAM(s) Oral two times a day  enoxaparin Injectable 40milliGRAM(s) SubCutaneous every 24 hours  sodium chloride 0.9%. 1000milliLiter(s) IV Continuous <Continuous>  insulin lispro (HumaLOG) corrective regimen sliding scale  SubCutaneous three times a day before meals  insulin lispro (HumaLOG) corrective regimen sliding scale  SubCutaneous at bedtime  dextrose 5%. 1000milliLiter(s) IV Continuous <Continuous>  dextrose 50% Injectable 12.5Gram(s) IV Push once  dextrose 50% Injectable 25Gram(s) IV Push once  dextrose 50% Injectable 25Gram(s) IV Push once  cefTRIAXone   IVPB 1Gram(s) IV Intermittent every 24 hours  carvedilol 12.5milliGRAM(s) Oral every 12 hours    MEDICATIONS  (PRN):  dextrose Gel 1Dose(s) Oral once PRN Blood Glucose LESS THAN 70 milliGRAM(s)/deciliter  glucagon  Injectable 1milliGRAM(s) IntraMuscular once PRN Glucose LESS THAN 70 milligrams/deciliter  acetaminophen   Tablet. 650milliGRAM(s) Oral every 6 hours PRN Mild to Moderate Pain      Vital Signs Last 24 Hrs  T(C): 36.8, Max: 36.9 (06-07 @ 20:43)  HR: 74 (74 - 105)  BP: 121/77 (113/74 - 151/81)  RR: 17 (17 - 19)  SpO2: 100% (94% - 100%)  Wt(kg): --  CAPILLARY BLOOD GLUCOSE  148 (2017 08:56)  144 (2017 23:38)    I&O's Summary      PHYSICAL EXAM:    GENERAL: NAD, well-developed, arousable to name but appears lethargic   HEAD:  Atraumatic, normocephalic  EYES: EOMI, PERRLA, conjunctiva and sclera clear  NECK: Supple, no JVD  CHEST/LUNG: Clear to auscultation bilaterally; no wheezes  HEART: Regular rate and rhythm; No murmurs, rubs, or gallops  ABDOMEN: Obese but soft, non-distended, mildly TTP over epigastrium and suprapubic region, nondistended; hypoactive bowel sounds  EXTREMITIES:  2+ peripheral pulses, no LE edema  PSYCH: AAOx3  NEUROLOGY: non-focal  SKIN: No rashes or lesions    LABS:                        12.0   7.96  )-----------( 281      ( 2017 07:42 )             37.0     06-08    142  |  99  |  14  ----------------------------<  164<H>  3.4<L>   |  28  |  0.53    Ca    8.6      2017 07:42  Phos  3.7     06-08  Mg     1.4     -08    TPro  6.1  /  Alb  3.4  /  TBili  0.3  /  DBili  x   /  AST  13  /  ALT  11  /  AlkPhos  60  06-08          Urinalysis Basic - ( 2017 16:50 )    Color: YELLOW / Appearance: TURBID / S.024 / pH: 6.0  Gluc: 50 / Ketone: TRACE  / Bili: SMALL / Urobili: 1 E.U.   Blood: SMALL / Protein: 300 / Nitrite: NEGATIVE   Leuk Esterase: MODERATE / RBC: 10-25 / WBC >50   Sq Epi: MANY / Non Sq Epi: x / Bacteria: MOD        RADIOLOGY & ADDITIONAL TESTS:    Imaging Personally Reviewed:    Consultant(s) Notes Reviewed:      Care Discussed with Consultants/Other Providers: Patient is a 57y old  Female who presents with a chief complaint of Abdominal pain, diarrhea, and dizziness (2017 22:12)      SUBJECTIVE / OVERNIGHT EVENTS:    Patient states abdominal pain has resolved overnight; no further episodes of diarrhea, denies N/V, has an appetite would like to eat solid food. She endorses depressed mood for the last 2-3 months but denies previous suicide attempts and suicidal ideations at this time. Unclear whether she follows with Psychiatry vs. Behavioral Therapy however patient states she was advised to go to ED by her Psychiatrist vs. Therapist and an EMS was called. No history of fever, chills, night sweats, back/flank pain, hematuria, dysuria or hematuria; sick contacts, recent antibiotic course or hospitalizations or introduction to new foods.     MEDICATIONS  (STANDING):  diVALproex ER 500milliGRAM(s) Oral at bedtime  atorvastatin 20milliGRAM(s) Oral at bedtime  QUEtiapine 400milliGRAM(s) Oral at bedtime  acyclovir   Tablet 400milliGRAM(s) Oral two times a day  enoxaparin Injectable 40milliGRAM(s) SubCutaneous every 24 hours  sodium chloride 0.9%. 1000milliLiter(s) IV Continuous <Continuous>  insulin lispro (HumaLOG) corrective regimen sliding scale  SubCutaneous three times a day before meals  insulin lispro (HumaLOG) corrective regimen sliding scale  SubCutaneous at bedtime  dextrose 5%. 1000milliLiter(s) IV Continuous <Continuous>  dextrose 50% Injectable 12.5Gram(s) IV Push once  dextrose 50% Injectable 25Gram(s) IV Push once  dextrose 50% Injectable 25Gram(s) IV Push once  cefTRIAXone   IVPB 1Gram(s) IV Intermittent every 24 hours  carvedilol 12.5milliGRAM(s) Oral every 12 hours    MEDICATIONS  (PRN):  dextrose Gel 1Dose(s) Oral once PRN Blood Glucose LESS THAN 70 milliGRAM(s)/deciliter  glucagon  Injectable 1milliGRAM(s) IntraMuscular once PRN Glucose LESS THAN 70 milligrams/deciliter  acetaminophen   Tablet. 650milliGRAM(s) Oral every 6 hours PRN Mild to Moderate Pain      Vital Signs Last 24 Hrs  T(C): 36.8, Max: 36.9 (06-07 @ 20:43)  HR: 74 (74 - 105)  BP: 121/77 (113/74 - 151/81)  RR: 17 (17 - 19)  SpO2: 100% (94% - 100%)  Wt(kg): --  CAPILLARY BLOOD GLUCOSE  148 (2017 08:56)  144 (2017 23:38)    I&O's Summary      PHYSICAL EXAM:    GENERAL: NAD, well-developed, arousable to name but appears lethargic   HEAD:  Atraumatic, normocephalic  EYES: EOMI, PERRLA, conjunctiva and sclera clear  NECK: Supple, no JVD  CHEST/LUNG: Clear to auscultation bilaterally; no wheezes  HEART: Regular rate and rhythm; No murmurs, rubs, or gallops  ABDOMEN: Obese but soft, non-distended, mildly TTP over epigastrium and suprapubic region, nondistended; hypoactive bowel sounds  EXTREMITIES:  2+ peripheral pulses, no LE edema  PSYCH: AAOx3  NEUROLOGY: non-focal  SKIN: No rashes or lesions    LABS:                        12.0   7.96  )-----------( 281      ( 2017 07:42 )             37.0     06-08    142  |  99  |  14  ----------------------------<  164<H>  3.4<L>   |  28  |  0.53    Ca    8.6      2017 07:42  Phos  3.7     06-08  Mg     1.4     06-08    TPro  6.1  /  Alb  3.4  /  TBili  0.3  /  DBili  x   /  AST  13  /  ALT  11  /  AlkPhos  60  06-08          Urinalysis Basic - ( 2017 16:50 )    Color: YELLOW / Appearance: TURBID / S.024 / pH: 6.0  Gluc: 50 / Ketone: TRACE  / Bili: SMALL / Urobili: 1 E.U.   Blood: SMALL / Protein: 300 / Nitrite: NEGATIVE   Leuk Esterase: MODERATE / RBC: 10-25 / WBC >50   Sq Epi: MANY / Non Sq Epi: x / Bacteria: MOD        RADIOLOGY & ADDITIONAL TESTS:    Imaging Personally Reviewed:    Consultant(s) Notes Reviewed:      Care Discussed with Consultants/Other Providers:

## 2017-06-09 VITALS
OXYGEN SATURATION: 98 % | SYSTOLIC BLOOD PRESSURE: 143 MMHG | TEMPERATURE: 98 F | HEART RATE: 76 BPM | RESPIRATION RATE: 18 BRPM | DIASTOLIC BLOOD PRESSURE: 86 MMHG

## 2017-06-09 LAB
BACTERIA UR CULT: SIGNIFICANT CHANGE UP
BUN SERPL-MCNC: 10 MG/DL — SIGNIFICANT CHANGE UP (ref 7–23)
CALCIUM SERPL-MCNC: 8.7 MG/DL — SIGNIFICANT CHANGE UP (ref 8.4–10.5)
CHLORIDE SERPL-SCNC: 102 MMOL/L — SIGNIFICANT CHANGE UP (ref 98–107)
CO2 SERPL-SCNC: 28 MMOL/L — SIGNIFICANT CHANGE UP (ref 22–31)
CREAT SERPL-MCNC: 0.43 MG/DL — LOW (ref 0.5–1.3)
GLUCOSE SERPL-MCNC: 152 MG/DL — HIGH (ref 70–99)
MAGNESIUM SERPL-MCNC: 1.6 MG/DL — SIGNIFICANT CHANGE UP (ref 1.6–2.6)
PHOSPHATE SERPL-MCNC: 2.6 MG/DL — SIGNIFICANT CHANGE UP (ref 2.5–4.5)
POTASSIUM SERPL-MCNC: 3.7 MMOL/L — SIGNIFICANT CHANGE UP (ref 3.5–5.3)
POTASSIUM SERPL-SCNC: 3.7 MMOL/L — SIGNIFICANT CHANGE UP (ref 3.5–5.3)
SODIUM SERPL-SCNC: 145 MMOL/L — SIGNIFICANT CHANGE UP (ref 135–145)
SPECIMEN SOURCE: SIGNIFICANT CHANGE UP

## 2017-06-09 PROCEDURE — 99239 HOSP IP/OBS DSCHRG MGMT >30: CPT

## 2017-06-09 RX ORDER — METFORMIN HYDROCHLORIDE 850 MG/1
1 TABLET ORAL
Qty: 0 | Refills: 0 | COMMUNITY

## 2017-06-09 RX ADMIN — Medication 400 MILLIGRAM(S): at 06:16

## 2017-06-09 RX ADMIN — CARVEDILOL PHOSPHATE 12.5 MILLIGRAM(S): 80 CAPSULE, EXTENDED RELEASE ORAL at 06:16

## 2017-06-09 NOTE — PROGRESS NOTE ADULT - ATTENDING COMMENTS
Gastroenteritis resolved, pt tolerating regular diet. D/C home with follow-up. Time on D/C 35 minutes.
Resolving gastroenteritis. Pt had presented with dehydration, now resolved. To transition to oral hydration and diet. Hypokalemia and hypomagnesemia on admission, now repleted. Monitor glucose on correctional lispro.

## 2017-06-09 NOTE — PROGRESS NOTE ADULT - PROBLEM SELECTOR PLAN 5
- endorses depressed mood, denies SI/previous suicide attempts  - follows with Psychiatry outpatient  - cont home Seroquel, Depakote
- endorses depressed mood, denies SI/previous suicide attempts  - follows with Psychiatry outpatient  - cont home Seroquel, Depakote

## 2017-06-09 NOTE — PROGRESS NOTE ADULT - PROBLEM SELECTOR PLAN 3
- grossly positive UA with moderate bacteria  - patient denies symptoms such as dysuria but endorses some suprapubic TTP  - s/p ceftriaxone 1 g x1  - will discontinue antibiotics now, monitor for symptoms - grossly positive UA with moderate bacteria, patient remains asymptomatic   - s/p ceftriaxone 1 g x1  - cont monitor off antibiotics

## 2017-06-09 NOTE — DISCHARGE NOTE ADULT - CARE PLAN
Principal Discharge DX:	Gastroenteritis  Goal:	No intervention required. Symptoms resolved.  Instructions for follow-up, activity and diet:	You were admitted for frequent diarrhea and were found to be very dehydrated. You were given plenty of IV fluids. Your diarrhea self-resolved and you are now able to eat solid foods. Your symptoms were likely due to a transient viral inflammation of your gut which has now passed. Please continue to drink plenty of water and stay well hydrated after your discharge.  Secondary Diagnosis:	Lactic acidosis  Goal:	Follow up with your primary medical doctor  Instructions for follow-up, activity and diet:	While you were hospitalized you were found to have elevated levels of  Secondary Diagnosis:	Type 2 diabetes mellitus  Goal:	Follow up with your primary medical doctor  Secondary Diagnosis:	Asymptomatic bacteriuria  Goal:	No intervention required. Principal Discharge DX:	Gastroenteritis  Goal:	No intervention required. Symptoms resolved.  Instructions for follow-up, activity and diet:	You were admitted for frequent diarrhea and were found to be very dehydrated. You were given plenty of IV fluids. Your diarrhea self-resolved and you are now able to eat solid foods. Your symptoms were likely due to a transient viral inflammation of your gut which has now passed. Please continue to drink plenty of water and stay well hydrated after your discharge.  Secondary Diagnosis:	Lactic acidosis  Goal:	Follow up with your primary medical doctor  Instructions for follow-up, activity and diet:	While you were hospitalized you were found to have elevated levels of an acid called lactate in your blood. This caused your blood to become very acidic and you required plenty of IV fluids to reduce the amount of lactate. This was likely caused by dehydration due to the frequent diarrhea you had or induced by the metformin you take for your diabetes. Stop taking metformin when you go home. Please make an appointment with your primary medical doctor (Dr. Andrea Temple) to review the medications you are taking for diabetes.  Secondary Diagnosis:	Type 2 diabetes mellitus  Goal:	Follow up with your primary medical doctor  Instructions for follow-up, activity and diet:	Your A1c was elevated to 8.5 this admission. Your diabetes can be better controlled with diet, exercise and medications. Stop taking metformin when you go home. The metformin may have caused elevated levels of acid in your blood. Please make an appointment with your primary medical doctor (Dr. Andrea Temple) to discuss alternative medications to manage your diabetes.  Goal:	No intervention required.

## 2017-06-09 NOTE — DISCHARGE NOTE ADULT - CONDITIONS AT DISCHARGE
Patient is alert and oriented x4, able to make needs known. patient is being discharged home. patient is stable.

## 2017-06-09 NOTE — PROGRESS NOTE ADULT - PROBLEM SELECTOR PLAN 1
- acute onset starting night prior to admission, a/w crampy abdominal pain - appears to be self-resolving  - afebrile, no leukocytosis, VSS - low concern for sepsis  - send stool O/P & culture  - supportive care with IV fluid hydration, Immodium PRN - acute onset a/w crampy abdominal pain - RESOLVED  - afebrile, no leukocytosis, VSS, tolerating PO diet  - no further need for stool O/P & culture  - supportive care with IV fluid hydration, Immodium PRN

## 2017-06-09 NOTE — DISCHARGE NOTE ADULT - PLAN OF CARE
Follow up with your primary medical doctor No intervention required. No intervention required. Symptoms resolved. You were admitted for frequent diarrhea and were found to be very dehydrated. You were given plenty of IV fluids. Your diarrhea self-resolved and you are now able to eat solid foods. Your symptoms were likely due to a transient viral inflammation of your gut which has now passed. Please continue to drink plenty of water and stay well hydrated after your discharge. While you were hospitalized you were found to have elevated levels of Your A1c was elevated to 8.5 this admission. Your diabetes can be better controlled with diet, exercise and medications. Stop taking metformin when you go home. The metformin may have caused elevated levels of acid in your blood. Please make an appointment with your primary medical doctor (Dr. Andrea Temple) to discuss alternative medications to manage your diabetes. While you were hospitalized you were found to have elevated levels of an acid called lactate in your blood. This caused your blood to become very acidic and you required plenty of IV fluids to reduce the amount of lactate. This was likely caused by dehydration due to the frequent diarrhea you had or induced by the metformin you take for your diabetes. Stop taking metformin when you go home. Please make an appointment with your primary medical doctor (Dr. Andrea Temple) to review the medications you are taking for diabetes.

## 2017-06-09 NOTE — DISCHARGE NOTE ADULT - MEDICATION SUMMARY - MEDICATIONS TO TAKE
I will START or STAY ON the medications listed below when I get home from the hospital:    Diovan 320 mg oral tablet  -- 1 tab(s) by mouth once a day  -- Indication: For Hypertension    Depakote  mg oral tablet, extended release  -- 1 tab(s) by mouth once a day (at bedtime)  -- Indication: For Bipolar affective disorder    Januvia 100 mg oral tablet  -- 1 tab(s) by mouth once a day  -- Indication: For Type 2 diabetes mellitus    glimepiride 4 mg oral tablet  -- 1 tab(s) by mouth once a day  -- Indication: For Type 2 diabetes mellitus    Lipitor 20 mg oral tablet  -- 1 tab(s) by mouth once a day (at bedtime)  -- Indication: For Hyperlipidemia    SEROquel 400 mg oral tablet  -- 1 tab(s) by mouth once a day (at bedtime)  -- Indication: For Bipolar affective disorder    acyclovir 400 mg oral tablet  -- 1 tab(s) by mouth 2 times a day  -- Indication: For Suppression of anogenital HSV    Coreg 25 mg oral tablet  -- 1 tab(s) by mouth once a day  -- Indication: For Hypertension

## 2017-06-09 NOTE — DISCHARGE NOTE ADULT - PATIENT PORTAL LINK FT
“You can access the Follow[facility] Patient Portal, offered by [facility], by registering with the following website: [email or web address]”

## 2017-06-09 NOTE — PROGRESS NOTE ADULT - SUBJECTIVE AND OBJECTIVE BOX
Patient is a 57y old  Female who presents with a chief complaint of Abdominal pain, diarrhea, and dizziness (2017 22:12)      SUBJECTIVE / OVERNIGHT EVENTS:    MEDICATIONS  (STANDING):  diVALproex ER 500milliGRAM(s) Oral at bedtime  atorvastatin 20milliGRAM(s) Oral at bedtime  QUEtiapine 400milliGRAM(s) Oral at bedtime  acyclovir   Tablet 400milliGRAM(s) Oral two times a day  sodium chloride 0.9%. 1000milliLiter(s) IV Continuous <Continuous>  insulin lispro (HumaLOG) corrective regimen sliding scale  SubCutaneous three times a day before meals  insulin lispro (HumaLOG) corrective regimen sliding scale  SubCutaneous at bedtime  dextrose 5%. 1000milliLiter(s) IV Continuous <Continuous>  dextrose 50% Injectable 12.5Gram(s) IV Push once  dextrose 50% Injectable 25Gram(s) IV Push once  dextrose 50% Injectable 25Gram(s) IV Push once  carvedilol 12.5milliGRAM(s) Oral every 12 hours    MEDICATIONS  (PRN):  dextrose Gel 1Dose(s) Oral once PRN Blood Glucose LESS THAN 70 milliGRAM(s)/deciliter  glucagon  Injectable 1milliGRAM(s) IntraMuscular once PRN Glucose LESS THAN 70 milligrams/deciliter  acetaminophen   Tablet. 650milliGRAM(s) Oral every 6 hours PRN Mild to Moderate Pain      Vital Signs Last 24 Hrs  T(C): 36.4, Max: 37 (-09 @ 01:38)  HR: 82 (80 - 92)  BP: 138/84 (137/91 - 165/90)  RR: 18 (17 - 18)  SpO2: 98% (96% - 100%)  Wt(kg): --  CAPILLARY BLOOD GLUCOSE  146 (2017 12:55)  141 (2017 08:52)  198 (2017 21:49)  156 (2017 17:09)    I&O's Summary      PHYSICAL EXAM:  GENERAL: NAD, well-developed  HEAD:  Atraumatic, Normocephalic  EYES: EOMI, PERRLA, conjunctiva and sclera clear  NECK: Supple, No JVD  CHEST/LUNG: Clear to auscultation bilaterally; No wheeze  HEART: Regular rate and rhythm; No murmurs, rubs, or gallops  ABDOMEN: Soft, Nontender, Nondistended; Bowel sounds present  EXTREMITIES:  2+ Peripheral Pulses, No clubbing, cyanosis, or edema  PSYCH: AAOx3  NEUROLOGY: non-focal  SKIN: No rashes or lesions    LABS:                        12.0   7.96  )-----------( 281      ( 2017 07:42 )             37.0     06-09    145  |  102  |  10  ----------------------------<  152<H>  3.7   |  28  |  0.43<L>    Ca    8.7      2017 06:20  Phos  2.6     06-  Mg     1.6     -    TPro  6.1  /  Alb  3.4  /  TBili  0.3  /  DBili  x   /  AST  13  /  ALT  11  /  AlkPhos  60  06-08          Urinalysis Basic - ( 2017 16:50 )    Color: YELLOW / Appearance: TURBID / S.024 / pH: 6.0  Gluc: 50 / Ketone: TRACE  / Bili: SMALL / Urobili: 1 E.U.   Blood: SMALL / Protein: 300 / Nitrite: NEGATIVE   Leuk Esterase: MODERATE / RBC: 10-25 / WBC >50   Sq Epi: MANY / Non Sq Epi: x / Bacteria: MOD        RADIOLOGY & ADDITIONAL TESTS:    Imaging Personally Reviewed:    Consultant(s) Notes Reviewed:      Care Discussed with Consultants/Other Providers: Patient is a 57y old  Female who presents with a chief complaint of Abdominal pain, diarrhea, and dizziness (2017 22:12)      SUBJECTIVE / OVERNIGHT EVENTS:    No overnight events. No acute complaints. Patient denies further loose stools, abdominal pain resolved. No fevers, chills, N/V.       MEDICATIONS  (STANDING):  diVALproex ER 500milliGRAM(s) Oral at bedtime  atorvastatin 20milliGRAM(s) Oral at bedtime  QUEtiapine 400milliGRAM(s) Oral at bedtime  acyclovir   Tablet 400milliGRAM(s) Oral two times a day  sodium chloride 0.9%. 1000milliLiter(s) IV Continuous <Continuous>  insulin lispro (HumaLOG) corrective regimen sliding scale  SubCutaneous three times a day before meals  insulin lispro (HumaLOG) corrective regimen sliding scale  SubCutaneous at bedtime  dextrose 5%. 1000milliLiter(s) IV Continuous <Continuous>  dextrose 50% Injectable 12.5Gram(s) IV Push once  dextrose 50% Injectable 25Gram(s) IV Push once  dextrose 50% Injectable 25Gram(s) IV Push once  carvedilol 12.5milliGRAM(s) Oral every 12 hours    MEDICATIONS  (PRN):  dextrose Gel 1Dose(s) Oral once PRN Blood Glucose LESS THAN 70 milliGRAM(s)/deciliter  glucagon  Injectable 1milliGRAM(s) IntraMuscular once PRN Glucose LESS THAN 70 milligrams/deciliter  acetaminophen   Tablet. 650milliGRAM(s) Oral every 6 hours PRN Mild to Moderate Pain      Vital Signs Last 24 Hrs  T(C): 36.4, Max: 37 (- @ 01:38)  HR: 82 (80 - 92)  BP: 138/84 (137/91 - 165/90)  RR: 18 (17 - 18)  SpO2: 98% (96% - 100%)  Wt(kg): --  CAPILLARY BLOOD GLUCOSE  146 (2017 12:55)  141 (2017 08:52)  198 (2017 21:49)  156 (2017 17:09)    I&O's Summary      PHYSICAL EXAM:  GENERAL: NAD, well-developed  HEAD:  Atraumatic, Normocephalic  EYES: EOMI, PERRLA, conjunctiva and sclera clear  NECK: Supple, No JVD  CHEST/LUNG: Clear to auscultation bilaterally; No wheeze  HEART: Regular rate and rhythm; No murmurs, rubs, or gallops  ABDOMEN: Soft, Nontender, Nondistended; Bowel sounds present  EXTREMITIES:  2+ Peripheral Pulses, No clubbing, cyanosis, or edema  PSYCH: AAOx3  NEUROLOGY: non-focal  SKIN: No rashes or lesions    LABS:                        12.0   7.96  )-----------( 281      ( 2017 07:42 )             37.0         145  |  102  |  10  ----------------------------<  152<H>  3.7   |  28  |  0.43<L>    Ca    8.7      2017 06:20  Phos  2.6       Mg     1.6         TPro  6.1  /  Alb  3.4  /  TBili  0.3  /  DBili  x   /  AST  13  /  ALT  11  /  AlkPhos  60            Urinalysis Basic - ( 2017 16:50 )    Color: YELLOW / Appearance: TURBID / S.024 / pH: 6.0  Gluc: 50 / Ketone: TRACE  / Bili: SMALL / Urobili: 1 E.U.   Blood: SMALL / Protein: 300 / Nitrite: NEGATIVE   Leuk Esterase: MODERATE / RBC: 10-25 / WBC >50   Sq Epi: MANY / Non Sq Epi: x / Bacteria: MOD        RADIOLOGY & ADDITIONAL TESTS:    Imaging Personally Reviewed:    Consultant(s) Notes Reviewed:      Care Discussed with Consultants/Other Providers: Patient is a 57y old  Female who presents with a chief complaint of Abdominal pain, diarrhea, and dizziness (2017 22:12)      SUBJECTIVE / OVERNIGHT EVENTS:    No overnight events. No acute complaints. Patient denies further loose stools, abdominal pain resolved. No fevers, chills, N/V.       MEDICATIONS  (STANDING):  diVALproex ER 500milliGRAM(s) Oral at bedtime  atorvastatin 20milliGRAM(s) Oral at bedtime  QUEtiapine 400milliGRAM(s) Oral at bedtime  acyclovir   Tablet 400milliGRAM(s) Oral two times a day  sodium chloride 0.9%. 1000milliLiter(s) IV Continuous <Continuous>  insulin lispro (HumaLOG) corrective regimen sliding scale  SubCutaneous three times a day before meals  insulin lispro (HumaLOG) corrective regimen sliding scale  SubCutaneous at bedtime  dextrose 5%. 1000milliLiter(s) IV Continuous <Continuous>  dextrose 50% Injectable 12.5Gram(s) IV Push once  dextrose 50% Injectable 25Gram(s) IV Push once  dextrose 50% Injectable 25Gram(s) IV Push once  carvedilol 12.5milliGRAM(s) Oral every 12 hours    MEDICATIONS  (PRN):  dextrose Gel 1Dose(s) Oral once PRN Blood Glucose LESS THAN 70 milliGRAM(s)/deciliter  glucagon  Injectable 1milliGRAM(s) IntraMuscular once PRN Glucose LESS THAN 70 milligrams/deciliter  acetaminophen   Tablet. 650milliGRAM(s) Oral every 6 hours PRN Mild to Moderate Pain      Vital Signs Last 24 Hrs  T(C): 36.4, Max: 37 (- @ 01:38)  HR: 82 (80 - 92)  BP: 138/84 (137/91 - 165/90)  RR: 18 (17 - 18)  SpO2: 98% (96% - 100%)  Wt(kg): --  CAPILLARY BLOOD GLUCOSE  146 (2017 12:55)  141 (2017 08:52)  198 (2017 21:49)  156 (2017 17:09)    I&O's Summary      PHYSICAL EXAM:    GENERAL: NAD, well-developed, sleeping but arousable   HEAD:  Atraumatic, normocephalic  EYES: EOMI, PERRLA, conjunctiva and sclera clear  NECK: Supple, no JVD  CHEST/LUNG: Clear to auscultation bilaterally; no wheeze  HEART: Regular rate and rhythm; No murmurs, rubs, or gallops  ABDOMEN: Soft, nontender, nondistended; bowel sounds present  EXTREMITIES:  2+ peripheral pulses, no edema  PSYCH: AAOx3, flat affect vs. guarded  NEUROLOGY: non-focal  SKIN: No rashes or lesions    LABS:                        12.0   7.96  )-----------( 281      ( 2017 07:42 )             37.0     -    145  |  102  |  10  ----------------------------<  152<H>  3.7   |  28  |  0.43<L>    Ca    8.7      2017 06:20  Phos  2.6     -  Mg     1.6         TPro  6.1  /  Alb  3.4  /  TBili  0.3  /  DBili  x   /  AST  13  /  ALT  11  /  AlkPhos  60          Urinalysis Basic - ( 2017 16:50 )    Color: YELLOW / Appearance: TURBID / S.024 / pH: 6.0  Gluc: 50 / Ketone: TRACE  / Bili: SMALL / Urobili: 1 E.U.   Blood: SMALL / Protein: 300 / Nitrite: NEGATIVE   Leuk Esterase: MODERATE / RBC: 10-25 / WBC >50   Sq Epi: MANY / Non Sq Epi: x / Bacteria: MOD        RADIOLOGY & ADDITIONAL TESTS:    Imaging Personally Reviewed:    Consultant(s) Notes Reviewed:      Care Discussed with Consultants/Other Providers:

## 2017-06-09 NOTE — PROGRESS NOTE ADULT - PROBLEM SELECTOR PLAN 4
- A1c 8.4% on home metformin 1000 BID, glimeperide, Januvia  - cont low-intensity Humalog SS  - CC diet - A1c 8.4% on home metformin 1000 BID, glimeperide, Januvia  - FSG controlled; cont low-intensity Humalog SS  - f/u with PMD regarding continued use of metformin

## 2017-06-09 NOTE — DISCHARGE NOTE ADULT - HOSPITAL COURSE
HPI:  56 yo Female with h/o bipolar disorder with depression (multiple inpatient hospitalizations, 1 self-aborted suicide attempt), DM Type2, HTN, osteoarthritis, and genital herpes presents from psychiatrist's office with complaint of dizziness (sensation of room spinning) while she was having diarrhea in the restroom. The dizziness lasted an hour and self-resolved when pt arrived to the ED. Pt reports that on Wednesday morning around 11am, she started having sharp, crampy lower abdominal pain, described as 9/10 in intensity, intermittent, nonradiating, and relieved with BM. Pt reports that her all her BMs today have been diarrhea, with 3 episodes of large-volume, watery, not foul-smelling, non-bloody, and non-mucusy diarrhea. Had normal BMs yesterday. Pt did not have breakfast or anything else to eat this morning prior to onset of the abdominal pain. Her last meal was on Tuesday evening ~6pm and consisted of plantains with eggs. No one else had same meal. Pt did not have anything to eat after onset of her abdominal pain and diarrhea, but did have dinner while in the ED and was able to tolerate it without any issues. Pt denies any associated F/C, CP, SOB, N/V, flank pain, or URI symptoms. No dysuria, increased urinary frequency, or urinary urgency. No recent antibiotic use, healthcare exposure, sick contacts, or travel. Pt states that she had both an EGD and colonoscopy ~2 years ago and both were normal. Of note, the pt not sure if takes Carvedilol daily or BID;     In the ED, T 98.2, HR , -151 / 74-81, RR 18, O2 Sat 97% RA. WBC wnl at 10.17, Cl 95, HCO3 26, BUN 19, Cr 0.88. LFTs and lipase wnl. VBG lactate elevated to 5.6, trended down to 5.1 with 3L bolus of NS. UA with negative nitrite, mod LE, >50 WBC, and mod bacteria, also with many squamous epithelial.     Hospital Course: HPI:  56 yo Female with h/o bipolar disorder with depression (multiple inpatient hospitalizations, 1 self-aborted suicide attempt), DM Type2, HTN, osteoarthritis, and genital herpes presents from psychiatrist's office with complaint of dizziness (sensation of room spinning) while she was having diarrhea in the restroom. The dizziness lasted an hour and self-resolved when pt arrived to the ED. Pt reports that on Wednesday morning around 11am, she started having sharp, crampy lower abdominal pain, described as 9/10 in intensity, intermittent, nonradiating, and relieved with BM. Pt reports that her all her BMs today have been diarrhea, with 3 episodes of large-volume, watery, not foul-smelling, non-bloody, and non-mucusy diarrhea. Had normal BMs yesterday. Pt did not have breakfast or anything else to eat this morning prior to onset of the abdominal pain. Her last meal was on Tuesday evening ~6pm and consisted of plantains with eggs. No one else had same meal. Pt did not have anything to eat after onset of her abdominal pain and diarrhea, but did have dinner while in the ED and was able to tolerate it without any issues. Pt denies any associated F/C, CP, SOB, N/V, flank pain, or URI symptoms. No dysuria, increased urinary frequency, or urinary urgency. No recent antibiotic use, healthcare exposure, sick contacts, or travel. Pt states that she had both an EGD and colonoscopy ~2 years ago and both were normal. Of note, the pt not sure if takes Carvedilol daily or BID;     In the ED, T 98.2, HR , -151 / 74-81, RR 18, O2 Sat 97% RA. WBC wnl at 10.17, Cl 95, HCO3 26, BUN 19, Cr 0.88. LFTs and lipase wnl. VBG lactate elevated to 5.6, trended down to 5.1 with 3L bolus of NS. UA with negative nitrite, mod LE, >50 WBC, and mod bacteria, also with many squamous epithelial.     Hospital Course:  Patient was admitted to Medicine for further management. Repeat lactate remained elevated to 5.7 and patient noted to have dry mucous membranes. Additional IV fluid boluses were given and lactate near normalized to 2.7. Lactic acidosis was thought to be attributed to dehydration in setting of gastroenteritis vs. metformin which patient has been taking at home. Diarrhea and abdominal pain self-resolved without further intervention. No stool studies were collected given resolution of symptoms. Patient is now tolerating PO intake and has remained afebrile without leukocytosis this admission. Patient is now medically stable for discharge to home and will follow up outpatient with primary medical doctor for review of medical management of diabetes.

## 2017-06-09 NOTE — DISCHARGE NOTE ADULT - PROVIDER TOKENS
FREE:[LAST:[Dr. Mendez],FIRST:[Windy],PHONE:[phone]],FAX:[(   [phone]    -],ADDRESS:[[address] [place] [address]]]

## 2018-06-02 NOTE — PROGRESS NOTE ADULT - PROBLEM SELECTOR PLAN 2
- admission lactate 5.5 -->5.1 --> 2.7 s/p 3L IV fluids  - likely drug-induced (i.e. metformin) vs. dehydration in setting of acute gastroenteritis  - will bolus 1L 0.9NS  - will repeat lactate and BMP after IVF bolus, replete lytes - admission lactate 5.5, currently s/p 5L IV fluid boluses   - likely drug-induced (i.e. metformin) vs. dehydration in setting of acute gastroenteritis  - f/u with PMD regarding continued use of metformin ADIA DUBOSE RN CCC [phone]

## 2018-08-01 ENCOUNTER — OUTPATIENT (OUTPATIENT)
Dept: OUTPATIENT SERVICES | Facility: HOSPITAL | Age: 58
LOS: 1 days | End: 2018-08-01

## 2018-08-01 ENCOUNTER — OUTPATIENT (OUTPATIENT)
Dept: OUTPATIENT SERVICES | Facility: HOSPITAL | Age: 58
LOS: 1 days | End: 2018-08-01
Payer: MEDICARE

## 2018-08-07 ENCOUNTER — EMERGENCY (EMERGENCY)
Facility: HOSPITAL | Age: 58
LOS: 1 days | Discharge: HOME | End: 2018-08-07
Admitting: EMERGENCY MEDICINE
Payer: MEDICAID

## 2018-08-07 VITALS
DIASTOLIC BLOOD PRESSURE: 104 MMHG | OXYGEN SATURATION: 99 % | HEART RATE: 91 BPM | RESPIRATION RATE: 16 BRPM | SYSTOLIC BLOOD PRESSURE: 168 MMHG | TEMPERATURE: 98 F

## 2018-08-07 VITALS
TEMPERATURE: 98 F | OXYGEN SATURATION: 98 % | RESPIRATION RATE: 16 BRPM | SYSTOLIC BLOOD PRESSURE: 148 MMHG | DIASTOLIC BLOOD PRESSURE: 92 MMHG | HEART RATE: 97 BPM

## 2018-08-07 DIAGNOSIS — F31.9 BIPOLAR DISORDER, UNSPECIFIED: ICD-10-CM

## 2018-08-07 LAB
ALBUMIN SERPL ELPH-MCNC: 4.8 G/DL — SIGNIFICANT CHANGE UP (ref 3.3–5)
ALP SERPL-CCNC: 107 U/L — SIGNIFICANT CHANGE UP (ref 40–120)
ALT FLD-CCNC: 18 U/L — SIGNIFICANT CHANGE UP (ref 4–33)
AMPHET UR-MCNC: NEGATIVE — SIGNIFICANT CHANGE UP
APPEARANCE UR: CLEAR — SIGNIFICANT CHANGE UP
AST SERPL-CCNC: 16 U/L — SIGNIFICANT CHANGE UP (ref 4–32)
BARBITURATES UR SCN-MCNC: NEGATIVE — SIGNIFICANT CHANGE UP
BASOPHILS # BLD AUTO: 0.06 K/UL — SIGNIFICANT CHANGE UP (ref 0–0.2)
BASOPHILS NFR BLD AUTO: 0.6 % — SIGNIFICANT CHANGE UP (ref 0–2)
BENZODIAZ UR-MCNC: NEGATIVE — SIGNIFICANT CHANGE UP
BILIRUB SERPL-MCNC: 0.6 MG/DL — SIGNIFICANT CHANGE UP (ref 0.2–1.2)
BILIRUB UR-MCNC: NEGATIVE — SIGNIFICANT CHANGE UP
BLOOD UR QL VISUAL: NEGATIVE — SIGNIFICANT CHANGE UP
BUN SERPL-MCNC: 26 MG/DL — HIGH (ref 7–23)
CALCIUM SERPL-MCNC: 10.3 MG/DL — SIGNIFICANT CHANGE UP (ref 8.4–10.5)
CANNABINOIDS UR-MCNC: NEGATIVE — SIGNIFICANT CHANGE UP
CHLORIDE SERPL-SCNC: 97 MMOL/L — LOW (ref 98–107)
CO2 SERPL-SCNC: 26 MMOL/L — SIGNIFICANT CHANGE UP (ref 22–31)
COCAINE METAB.OTHER UR-MCNC: NEGATIVE — SIGNIFICANT CHANGE UP
COLOR SPEC: SIGNIFICANT CHANGE UP
CREAT SERPL-MCNC: 0.84 MG/DL — SIGNIFICANT CHANGE UP (ref 0.5–1.3)
EOSINOPHIL # BLD AUTO: 0.12 K/UL — SIGNIFICANT CHANGE UP (ref 0–0.5)
EOSINOPHIL NFR BLD AUTO: 1.1 % — SIGNIFICANT CHANGE UP (ref 0–6)
ETHANOL BLD-MCNC: < 10 MG/DL — SIGNIFICANT CHANGE UP
GLUCOSE SERPL-MCNC: 140 MG/DL — HIGH (ref 70–99)
GLUCOSE UR-MCNC: >1000 — SIGNIFICANT CHANGE UP
HCT VFR BLD CALC: 42.7 % — SIGNIFICANT CHANGE UP (ref 34.5–45)
HGB BLD-MCNC: 13.8 G/DL — SIGNIFICANT CHANGE UP (ref 11.5–15.5)
IMM GRANULOCYTES # BLD AUTO: 0.04 # — SIGNIFICANT CHANGE UP
IMM GRANULOCYTES NFR BLD AUTO: 0.4 % — SIGNIFICANT CHANGE UP (ref 0–1.5)
KETONES UR-MCNC: NEGATIVE — SIGNIFICANT CHANGE UP
LEUKOCYTE ESTERASE UR-ACNC: NEGATIVE — SIGNIFICANT CHANGE UP
LYMPHOCYTES # BLD AUTO: 4.28 K/UL — HIGH (ref 1–3.3)
LYMPHOCYTES # BLD AUTO: 40.7 % — SIGNIFICANT CHANGE UP (ref 13–44)
MCHC RBC-ENTMCNC: 29.2 PG — SIGNIFICANT CHANGE UP (ref 27–34)
MCHC RBC-ENTMCNC: 32.3 % — SIGNIFICANT CHANGE UP (ref 32–36)
MCV RBC AUTO: 90.5 FL — SIGNIFICANT CHANGE UP (ref 80–100)
METHADONE UR-MCNC: NEGATIVE — SIGNIFICANT CHANGE UP
MONOCYTES # BLD AUTO: 0.84 K/UL — SIGNIFICANT CHANGE UP (ref 0–0.9)
MONOCYTES NFR BLD AUTO: 8 % — SIGNIFICANT CHANGE UP (ref 2–14)
NEUTROPHILS # BLD AUTO: 5.17 K/UL — SIGNIFICANT CHANGE UP (ref 1.8–7.4)
NEUTROPHILS NFR BLD AUTO: 49.2 % — SIGNIFICANT CHANGE UP (ref 43–77)
NITRITE UR-MCNC: NEGATIVE — SIGNIFICANT CHANGE UP
NRBC # FLD: 0 — SIGNIFICANT CHANGE UP
OPIATES UR-MCNC: NEGATIVE — SIGNIFICANT CHANGE UP
OXYCODONE UR-MCNC: NEGATIVE — SIGNIFICANT CHANGE UP
PCP UR-MCNC: NEGATIVE — SIGNIFICANT CHANGE UP
PH UR: 7 — SIGNIFICANT CHANGE UP (ref 4.6–8)
PLATELET # BLD AUTO: 387 K/UL — SIGNIFICANT CHANGE UP (ref 150–400)
PMV BLD: 10.2 FL — SIGNIFICANT CHANGE UP (ref 7–13)
POTASSIUM SERPL-MCNC: 4.1 MMOL/L — SIGNIFICANT CHANGE UP (ref 3.5–5.3)
POTASSIUM SERPL-SCNC: 4.1 MMOL/L — SIGNIFICANT CHANGE UP (ref 3.5–5.3)
PROT SERPL-MCNC: 7.3 G/DL — SIGNIFICANT CHANGE UP (ref 6–8.3)
PROT UR-MCNC: NEGATIVE MG/DL — SIGNIFICANT CHANGE UP
RBC # BLD: 4.72 M/UL — SIGNIFICANT CHANGE UP (ref 3.8–5.2)
RBC # FLD: 12.2 % — SIGNIFICANT CHANGE UP (ref 10.3–14.5)
RBC CASTS # UR COMP ASSIST: SIGNIFICANT CHANGE UP (ref 0–?)
REVIEW TO FOLLOW: YES — SIGNIFICANT CHANGE UP
SODIUM SERPL-SCNC: 138 MMOL/L — SIGNIFICANT CHANGE UP (ref 135–145)
SP GR SPEC: 1.01 — SIGNIFICANT CHANGE UP (ref 1–1.04)
SQUAMOUS # UR AUTO: SIGNIFICANT CHANGE UP
UROBILINOGEN FLD QL: NORMAL MG/DL — SIGNIFICANT CHANGE UP
WBC # BLD: 10.51 K/UL — HIGH (ref 3.8–10.5)
WBC # FLD AUTO: 10.51 K/UL — HIGH (ref 3.8–10.5)
WBC UR QL: SIGNIFICANT CHANGE UP (ref 0–?)

## 2018-08-07 PROCEDURE — 99284 EMERGENCY DEPT VISIT MOD MDM: CPT

## 2018-08-07 PROCEDURE — 90792 PSYCH DIAG EVAL W/MED SRVCS: CPT | Mod: GC

## 2018-08-07 NOTE — ED BEHAVIORAL HEALTH ASSESSMENT NOTE - RISK ASSESSMENT
Chronic risk factors: single, hx of multiple psych admissions, not involved in a career/hobby. Protective factors: medication and treatment compliant; no hx of suicide attempts; no hx of self-injurious behavior; no legal issues; motivated for help; access to health services. Chronic risk factors: single, hx of multiple psych admissions, not involved in a career/hobby. Protective factors: medication and treatment compliant; no hx of suicide attempts;  no hx of drug use; no access to guns or weapons; supportive family; stable domicile; no hx of self-injurious behavior; no legal issues; motivated for help; access to health services. no acute risk factors identified

## 2018-08-07 NOTE — ED BEHAVIORAL HEALTH ASSESSMENT NOTE - DIFFERENTIAL
Bipolar disorder type 1, R/O borderline personality disorder rule out Cluster B personality disorder

## 2018-08-07 NOTE — ED BEHAVIORAL HEALTH ASSESSMENT NOTE - HPI (INCLUDE ILLNESS QUALITY, SEVERITY, DURATION, TIMING, CONTEXT, MODIFYING FACTORS, ASSOCIATED SIGNS AND SYMPTOMS)
Pt is a 56 yo [place] F, , recently unemployed, domiciled with adult son, daughter, and 9 y/o grandson, hx of bipolar 1 disorder, reported hx of over 20 inpatient psychiatric hospitalizations, no known hx of violence/substance abuse, 1 remote self-aborted suicide attempt, significant fam hx of suicide, and complicated  PMhx of sleep apnea, HLD, DM, OA, HTN, HSV-2. Pt was brought by EMS after sister called police , sister reported that pt made threats to " burn the house" Pt currently denies these accusations.    Pt is noted to be pacing and anxious, writer attempted to obtain hx , pt was uncooperative and started yelling at writer, stated that she is tired and she wants to go home. Writer explained to pt the screening and evaluation process an importance of engaging with team to answer questions.     Writer attempted to call  the daughter Maddison @ [phone], she did not answer and writer left VM to call back Pt is a 56 yo [place] F, , recently unemployed, domiciled with adult son, daughter, and 7 y/o grandson, hx of bipolar 1 disorder, reported hx of over 20 inpatient psychiatric hospitalizations, no known hx of violence/substance abuse, 1 remote self-aborted suicide attempt, significant fam hx of suicide, and complicated  PMhx of sleep apnea, HLD, DM, OA, HTN, HSV-2. Pt was brought by EMS after sister called police , sister reported that pt made threats to " burn the house" Pt currently denies these accusations.    Pt is noted to be pacing and anxious, writer attempted to obtain hx , pt was uncooperative and started yelling at writer, stated that she is tired and she wants to go home. Writer explained to pt the screening and evaluation process an importance of engaging with team to answer questions.     @ 20:30 Writer attempted to call  the daughter Maddison @ [phone], she did not answer and writer left  to call back  @ 10:00 pm , writer left message to the pt's son on number in [place] [phone], no answer and went to , Pt is a 56 yo [place]ian F, , recently unemployed, domiciled with adult son, daughter, and 9 y/o grandson, hx of bipolar 1 disorder, reported hx of over 20 inpatient psychiatric hospitalizations, no known hx of violence/substance abuse, 1 remote self-aborted suicide attempt, significant fam hx of suicide, and complicated  PMhx of sleep apnea, HLD, DM, OA, HTN, HSV-2. Pt was brought by EMS after sister called police , sister reported that pt made threats to " burn the house" Pt currently denies these accusations.    Pt is noted to be pacing and anxious, writer attempted to obtain hx , pt was uncooperative and started yelling at writer, stated that she is tired and she wants to go home. Writer explained to pt the screening and evaluation process an importance of engaging with team to answer questions.     @ 20:30 Writer attempted to call  the daughter Maddison @ [phone], she did not answer and writer left VM to call back  @ 10:00 pm , writer left message to the pt's son on number in [place] [phone], no answer and went to ,    @11:00 pm, pt was re evaluated, noted to be calmer and chatting with staff in ED, Pt stated that she was angry and had a argument with her neighbor, she believes that her neighbor sometime trans passing through her back yard and this made her mad, pt stated that police was called and she was brought to the hospital for eval.  Pt reports compliance with her psychotropic medications, no SE reported, pt strongly denies any mood changes, sleep changes or changes in level of concentration, Pt reports chronic stressors as family conflicts and limited social support. Pt denies any worsening depressive symptoms, denies any manic or psychotic symptoms. Pt denies any negative thoughts to hurt self or others. Pt denies any active substance use.    Collateral obtained from the pt's daughter who lives with her, she reports that she was outside when pt was bought to hospital, states that pt usually hs hx of low frustration tolerance and poor coping with stress, per daughter, pt has no hx of suicidal behavior or attempts in the past, she reports that pt was sent to [facility] last week for an evaluation as pt was angry and started to yell at them and became irritable. Pt was kept overnight and sent home. Pt is a 56 yo Polish F, , recently unemployed, domiciled with adult son, daughter, and 7 y/o grandson, hx of bipolar 1 disorder, reported hx of over 20 lifetime inpatient psychiatric hospitalizations, no known hx of violence/substance abuse, 1 remote self-aborted suicide attempt, significant fam hx of suicide, and complicated  PMhx of sleep apnea, HLD, DM, OA, HTN, HSV-2. Pt was brought by EMS after sister called police , sister reported that pt made threats to " burn the house" which Pt denied making.    Pt is noted to be pacing, expressing desire to go home and does not want to wait long. Writer attempted to obtain hx , pt was uncooperative and started yelling at writer, stated that she is tired and she wants to go home. Writer explained to pt the screening and evaluation process an importance of engaging with team to answer questions.     @ 20:30 Writer attempted to call  the daughter Maddison @ [phone], she did not answer and writer left  to call back  @ 10:00 pm , writer left message to the pt's son on number in [place] [phone], no answer and went to ,    @11:00 pm, pt was re evaluated, noted to be calmer and chatting with staff in ED. Pt stated that she was angry and had a argument with her neighbor, she believes that her neighbor sometime trespasses through her back yard and this made her upset so she said something. Conversation seemingly escalated (no physical violence) as patient herself called the police to complaint, and ended up herself being brought in. Patient does not understand why she was brought into the hospital. admits she was arguing with neighbor and made statements in "the heat of the moment" which she did not mean.Pt reports compliance with her psychotropic medications, no SE reported, pt strongly denies any mood changes, sleep changes or changes in level of concentration, Pt reports chronic stressors as family conflicts and limited social support. Pt denies any worsening depressive symptoms, denies any manic or psychotic symptoms. Pt denies any negative thoughts to hurt self or others. Pt denies any active substance use. She is tending to her ADLs and is well groomed with makeup on, nails done. Denies psychotic/manic/anxiety/panic symptoms. Denies access to guns or weapons. denies substance / drug use.     COLLATERAL obtained from the pt's daughter who lives with her, she reports that she was outside when pt was bought to hospital, states that pt usually hs hx of low frustration tolerance including quickly arguign with people (seemingly lifelong trait) and limited coping with stress (also lifelong character traits). Per daughter, pt has no hx of suicidal behavior or attempts in the past, she reports that pt was sent to [facility] last week for an evaluation as pt was angry and started to yell at them and became irritable. Pt was kept overnight and sent home. Otherwise, patient has been her usual self, functioning at baseline, takes her medications, goes to doctor appointments. No guns in the house. No acute safety concerns.
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
## 2018-08-07 NOTE — ED ADULT NURSE NOTE - NSIMPLEMENTINTERV_GEN_ALL_ED
Implemented All Universal Safety Interventions:  [place] to call system. Call bell, personal items and telephone within reach. Instruct patient to call for assistance. Room bathroom lighting operational. Non-slip footwear when patient is off stretcher. Physically safe environment: no spills, clutter or unnecessary equipment. Stretcher in lowest position, wheels locked, appropriate side rails in place.
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
## 2018-08-07 NOTE — ED BEHAVIORAL HEALTH ASSESSMENT NOTE - OTHER
Sister called EMS CVM hx of impulsivity somewhat elated but not labile some tendency to exaggerate people's qualities

## 2018-08-07 NOTE — ED BEHAVIORAL HEALTH ASSESSMENT NOTE - AXIS IV
Economic problems/Problem related to social environment/Housing problems/Problems with primary support

## 2018-08-07 NOTE — ED BEHAVIORAL HEALTH ASSESSMENT NOTE - OTHER PAST PSYCHIATRIC HISTORY (INCLUDE DETAILS REGARDING ONSET, COURSE OF ILLNESS, INPATIENT/OUTPATIENT TREATMENT)
Pt has hx of bipolar disorder with multiple admissions > 20 times, pt denies any hx of suicidal behavior or self injurious behavior Pt has hx of bipolar disorder with multiple admissions > 20 times, pt denies any hx of suicidal behavior or self injurious behavior  - attended the [facility] outpatient clinic

## 2018-08-07 NOTE — ED BEHAVIORAL HEALTH ASSESSMENT NOTE - CASE SUMMARY
Writer also saw ans evaluated Patient independently - has some mild manic symptoms including elated mood, easily irritated then switches back and a dramatic presentation. Suspecting underlying Axis II Cluster B character traits (histrionic) but she is not pressured, able to maintain attention, no overt delusions or grandiosity; denies risk taking behavior and has been functioning at baseline as per Patient and collateral. Patient reports that she has  24yo man in her life she gives money to for his delong shop license but has had no sex with him in 4 months which she misses. (complained that he told her that he loves her as a "second mom."). Patient reported that she took care of her kids on her own as their father wasn't in their lives and also did not financially help out. Patient close with her family - her niece did her nails (fresh manicure with rhinestones) and she is close with her son who got her a nice place to live at in [place]. Patient denies any suicidal, homicidal ideation; names protective factors (her daughter and son, family, [place] in [place]). She enjoys her days - likes fashion and [place] short fe shorts for the summer. She has no ill will towards her neighbors. No grounds for involuntary admission. patient does not want to be admitted. Discharge.
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
## 2018-08-07 NOTE — ED BEHAVIORAL HEALTH ASSESSMENT NOTE - SUICIDE PROTECTIVE FACTORS
Identifies reasons for living/Fear of death or dying due to pain/suffering/Positive therapeutic relationships/Responsibility to family and others Identifies reasons for living/Fear of death or dying due to pain/suffering/Positive therapeutic relationships/Supportive social network or family/Responsibility to family and others

## 2018-08-08 ENCOUNTER — INPATIENT (INPATIENT)
Facility: HOSPITAL | Age: 58
LOS: 18 days | Discharge: HOME | End: 2018-08-27
Attending: PSYCHIATRY & NEUROLOGY | Admitting: PSYCHIATRY & NEUROLOGY
Payer: MEDICAID

## 2018-08-08 VITALS
SYSTOLIC BLOOD PRESSURE: 157 MMHG | DIASTOLIC BLOOD PRESSURE: 96 MMHG | RESPIRATION RATE: 16 BRPM | TEMPERATURE: 98 F | HEART RATE: 86 BPM | OXYGEN SATURATION: 98 %

## 2018-08-08 DIAGNOSIS — R69 ILLNESS, UNSPECIFIED: ICD-10-CM

## 2018-08-08 DIAGNOSIS — F31.64 BIPOLAR DISORDER, CURRENT EPISODE MIXED, SEVERE, WITH PSYCHOTIC FEATURES: ICD-10-CM

## 2018-08-08 DIAGNOSIS — R45.1 RESTLESSNESS AND AGITATION: ICD-10-CM

## 2018-08-08 PROBLEM — M19.90 UNSPECIFIED OSTEOARTHRITIS, UNSPECIFIED SITE: Chronic | Status: ACTIVE | Noted: 2017-04-05

## 2018-08-08 PROBLEM — I10 ESSENTIAL (PRIMARY) HYPERTENSION: Chronic | Status: ACTIVE | Noted: 2017-04-05

## 2018-08-08 PROBLEM — F32.9 MAJOR DEPRESSIVE DISORDER, SINGLE EPISODE, UNSPECIFIED: Chronic | Status: ACTIVE | Noted: 2017-04-05

## 2018-08-08 PROBLEM — E11.9 TYPE 2 DIABETES MELLITUS WITHOUT COMPLICATIONS: Chronic | Status: ACTIVE | Noted: 2017-04-05

## 2018-08-08 LAB
ALBUMIN SERPL ELPH-MCNC: 4.5 G/DL — SIGNIFICANT CHANGE UP (ref 3.3–5)
ALP SERPL-CCNC: 102 U/L — SIGNIFICANT CHANGE UP (ref 40–120)
ALT FLD-CCNC: 17 U/L — SIGNIFICANT CHANGE UP (ref 4–33)
AMPHET UR-MCNC: NEGATIVE — SIGNIFICANT CHANGE UP
APAP SERPL-MCNC: < 15 UG/ML — LOW (ref 15–25)
APAP SERPL-MCNC: < 15 UG/ML — LOW (ref 15–25)
APPEARANCE UR: CLEAR — SIGNIFICANT CHANGE UP
AST SERPL-CCNC: 18 U/L — SIGNIFICANT CHANGE UP (ref 4–32)
BACTERIA # UR AUTO: SIGNIFICANT CHANGE UP
BARBITURATES UR SCN-MCNC: NEGATIVE — SIGNIFICANT CHANGE UP
BASE EXCESS BLDV CALC-SCNC: 4 MMOL/L — SIGNIFICANT CHANGE UP
BASOPHILS # BLD AUTO: 0.06 K/UL — SIGNIFICANT CHANGE UP (ref 0–0.2)
BASOPHILS NFR BLD AUTO: 0.7 % — SIGNIFICANT CHANGE UP (ref 0–2)
BENZODIAZ UR-MCNC: NEGATIVE — SIGNIFICANT CHANGE UP
BILIRUB SERPL-MCNC: 0.7 MG/DL — SIGNIFICANT CHANGE UP (ref 0.2–1.2)
BILIRUB UR-MCNC: NEGATIVE — SIGNIFICANT CHANGE UP
BLOOD GAS VENOUS - CREATININE: 0.48 MG/DL — LOW (ref 0.5–1.3)
BLOOD UR QL VISUAL: NEGATIVE — SIGNIFICANT CHANGE UP
BUN SERPL-MCNC: 23 MG/DL — SIGNIFICANT CHANGE UP (ref 7–23)
CALCIUM SERPL-MCNC: 10 MG/DL — SIGNIFICANT CHANGE UP (ref 8.4–10.5)
CANNABINOIDS UR-MCNC: NEGATIVE — SIGNIFICANT CHANGE UP
CHLORIDE BLDV-SCNC: 102 MMOL/L — SIGNIFICANT CHANGE UP (ref 96–108)
CHLORIDE SERPL-SCNC: 97 MMOL/L — LOW (ref 98–107)
CO2 SERPL-SCNC: 26 MMOL/L — SIGNIFICANT CHANGE UP (ref 22–31)
COCAINE METAB.OTHER UR-MCNC: NEGATIVE — SIGNIFICANT CHANGE UP
COLOR SPEC: YELLOW — SIGNIFICANT CHANGE UP
CREAT SERPL-MCNC: 0.62 MG/DL — SIGNIFICANT CHANGE UP (ref 0.5–1.3)
EOSINOPHIL # BLD AUTO: 0.1 K/UL — SIGNIFICANT CHANGE UP (ref 0–0.5)
EOSINOPHIL NFR BLD AUTO: 1.1 % — SIGNIFICANT CHANGE UP (ref 0–6)
ETHANOL BLD-MCNC: < 10 MG/DL — SIGNIFICANT CHANGE UP
GAS PNL BLDV: 134 MMOL/L — LOW (ref 136–146)
GLUCOSE BLDC GLUCOMTR-MCNC: 226 MG/DL — HIGH (ref 70–99)
GLUCOSE BLDV-MCNC: 176 — HIGH (ref 70–99)
GLUCOSE SERPL-MCNC: 177 MG/DL — HIGH (ref 70–99)
GLUCOSE UR-MCNC: >1000 — SIGNIFICANT CHANGE UP
HCO3 BLDV-SCNC: 27 MMOL/L — SIGNIFICANT CHANGE UP (ref 20–27)
HCT VFR BLD CALC: 40.8 % — SIGNIFICANT CHANGE UP (ref 34.5–45)
HCT VFR BLDV CALC: 42.3 % — SIGNIFICANT CHANGE UP (ref 34.5–45)
HGB BLD-MCNC: 13.3 G/DL — SIGNIFICANT CHANGE UP (ref 11.5–15.5)
HGB BLDV-MCNC: 13.8 G/DL — SIGNIFICANT CHANGE UP (ref 11.5–15.5)
IMM GRANULOCYTES # BLD AUTO: 0.05 # — SIGNIFICANT CHANGE UP
IMM GRANULOCYTES NFR BLD AUTO: 0.6 % — SIGNIFICANT CHANGE UP (ref 0–1.5)
KETONES UR-MCNC: NEGATIVE — SIGNIFICANT CHANGE UP
LACTATE BLDV-MCNC: 2.5 MMOL/L — HIGH (ref 0.5–2)
LEUKOCYTE ESTERASE UR-ACNC: HIGH
LYMPHOCYTES # BLD AUTO: 3.77 K/UL — HIGH (ref 1–3.3)
LYMPHOCYTES # BLD AUTO: 42.7 % — SIGNIFICANT CHANGE UP (ref 13–44)
MCHC RBC-ENTMCNC: 29.7 PG — SIGNIFICANT CHANGE UP (ref 27–34)
MCHC RBC-ENTMCNC: 32.6 % — SIGNIFICANT CHANGE UP (ref 32–36)
MCV RBC AUTO: 91.1 FL — SIGNIFICANT CHANGE UP (ref 80–100)
METHADONE UR-MCNC: NEGATIVE — SIGNIFICANT CHANGE UP
MONOCYTES # BLD AUTO: 0.72 K/UL — SIGNIFICANT CHANGE UP (ref 0–0.9)
MONOCYTES NFR BLD AUTO: 8.2 % — SIGNIFICANT CHANGE UP (ref 2–14)
MUCOUS THREADS # UR AUTO: SIGNIFICANT CHANGE UP
NEUTROPHILS # BLD AUTO: 4.13 K/UL — SIGNIFICANT CHANGE UP (ref 1.8–7.4)
NEUTROPHILS NFR BLD AUTO: 46.7 % — SIGNIFICANT CHANGE UP (ref 43–77)
NITRITE UR-MCNC: NEGATIVE — SIGNIFICANT CHANGE UP
NON-SQ EPI CELLS # UR AUTO: <1 — SIGNIFICANT CHANGE UP
NRBC # FLD: 0 — SIGNIFICANT CHANGE UP
OPIATES UR-MCNC: NEGATIVE — SIGNIFICANT CHANGE UP
OXYCODONE UR-MCNC: NEGATIVE — SIGNIFICANT CHANGE UP
PCO2 BLDV: 47 MMHG — SIGNIFICANT CHANGE UP (ref 41–51)
PCP UR-MCNC: NEGATIVE — SIGNIFICANT CHANGE UP
PH BLDV: 7.4 PH — SIGNIFICANT CHANGE UP (ref 7.32–7.43)
PH UR: 6 — SIGNIFICANT CHANGE UP (ref 4.6–8)
PLATELET # BLD AUTO: 392 K/UL — SIGNIFICANT CHANGE UP (ref 150–400)
PMV BLD: 10.5 FL — SIGNIFICANT CHANGE UP (ref 7–13)
PO2 BLDV: 76 MMHG — HIGH (ref 35–40)
POTASSIUM BLDV-SCNC: 3.7 MMOL/L — SIGNIFICANT CHANGE UP (ref 3.4–4.5)
POTASSIUM SERPL-MCNC: 4.2 MMOL/L — SIGNIFICANT CHANGE UP (ref 3.5–5.3)
POTASSIUM SERPL-SCNC: 4.2 MMOL/L — SIGNIFICANT CHANGE UP (ref 3.5–5.3)
PROT SERPL-MCNC: 7.1 G/DL — SIGNIFICANT CHANGE UP (ref 6–8.3)
PROT UR-MCNC: 10 MG/DL — SIGNIFICANT CHANGE UP
RBC # BLD: 4.48 M/UL — SIGNIFICANT CHANGE UP (ref 3.8–5.2)
RBC # FLD: 12.5 % — SIGNIFICANT CHANGE UP (ref 10.3–14.5)
RBC CASTS # UR COMP ASSIST: SIGNIFICANT CHANGE UP (ref 0–?)
SALICYLATES SERPL-MCNC: < 5 MG/DL — LOW (ref 15–30)
SALICYLATES SERPL-MCNC: < 5 MG/DL — LOW (ref 15–30)
SAO2 % BLDV: 94.9 % — HIGH (ref 60–85)
SODIUM SERPL-SCNC: 138 MMOL/L — SIGNIFICANT CHANGE UP (ref 135–145)
SP GR SPEC: 1.03 — SIGNIFICANT CHANGE UP (ref 1–1.04)
SQUAMOUS # UR AUTO: SIGNIFICANT CHANGE UP
TSH SERPL-MCNC: 1.78 UIU/ML — SIGNIFICANT CHANGE UP (ref 0.27–4.2)
TSH SERPL-MCNC: 1.86 UIU/ML — SIGNIFICANT CHANGE UP (ref 0.27–4.2)
UROBILINOGEN FLD QL: NORMAL MG/DL — SIGNIFICANT CHANGE UP
WBC # BLD: 8.83 K/UL — SIGNIFICANT CHANGE UP (ref 3.8–10.5)
WBC # FLD AUTO: 8.83 K/UL — SIGNIFICANT CHANGE UP (ref 3.8–10.5)
WBC UR QL: SIGNIFICANT CHANGE UP (ref 0–?)

## 2018-08-08 PROCEDURE — 99285 EMERGENCY DEPT VISIT HI MDM: CPT

## 2018-08-08 RX ORDER — DIPHENHYDRAMINE HCL 50 MG
50 CAPSULE ORAL ONCE
Qty: 0 | Refills: 0 | Status: DISCONTINUED | OUTPATIENT
Start: 2018-08-08 | End: 2018-08-27

## 2018-08-08 RX ORDER — METFORMIN HYDROCHLORIDE 850 MG/1
1000 TABLET ORAL EVERY 12 HOURS
Qty: 0 | Refills: 0 | Status: DISCONTINUED | OUTPATIENT
Start: 2018-08-08 | End: 2018-08-16

## 2018-08-08 RX ORDER — DIPHENHYDRAMINE HCL 50 MG
50 CAPSULE ORAL ONCE
Qty: 0 | Refills: 0 | Status: DISCONTINUED | OUTPATIENT
Start: 2018-08-08 | End: 2018-08-08

## 2018-08-08 RX ORDER — LAMOTRIGINE 25 MG/1
100 TABLET, ORALLY DISINTEGRATING ORAL AT BEDTIME
Qty: 0 | Refills: 0 | Status: DISCONTINUED | OUTPATIENT
Start: 2018-08-08 | End: 2018-08-27

## 2018-08-08 RX ORDER — INSULIN LISPRO 100/ML
VIAL (ML) SUBCUTANEOUS AT BEDTIME
Qty: 0 | Refills: 0 | Status: DISCONTINUED | OUTPATIENT
Start: 2018-08-08 | End: 2018-08-16

## 2018-08-08 RX ORDER — PANTOPRAZOLE SODIUM 20 MG/1
40 TABLET, DELAYED RELEASE ORAL DAILY
Qty: 0 | Refills: 0 | Status: DISCONTINUED | OUTPATIENT
Start: 2018-08-08 | End: 2018-08-27

## 2018-08-08 RX ORDER — HALOPERIDOL DECANOATE 100 MG/ML
5 INJECTION INTRAMUSCULAR ONCE
Qty: 0 | Refills: 0 | Status: DISCONTINUED | OUTPATIENT
Start: 2018-08-08 | End: 2018-08-27

## 2018-08-08 RX ORDER — SODIUM CHLORIDE 9 MG/ML
1000 INJECTION, SOLUTION INTRAVENOUS
Qty: 0 | Refills: 0 | Status: DISCONTINUED | OUTPATIENT
Start: 2018-08-08 | End: 2018-08-16

## 2018-08-08 RX ORDER — LOSARTAN POTASSIUM 100 MG/1
100 TABLET, FILM COATED ORAL DAILY
Qty: 0 | Refills: 0 | Status: DISCONTINUED | OUTPATIENT
Start: 2018-08-08 | End: 2018-08-27

## 2018-08-08 RX ORDER — HALOPERIDOL DECANOATE 100 MG/ML
5 INJECTION INTRAMUSCULAR EVERY 6 HOURS
Qty: 0 | Refills: 0 | Status: DISCONTINUED | OUTPATIENT
Start: 2018-08-08 | End: 2018-08-21

## 2018-08-08 RX ORDER — DEXTROSE 50 % IN WATER 50 %
15 SYRINGE (ML) INTRAVENOUS ONCE
Qty: 0 | Refills: 0 | Status: DISCONTINUED | OUTPATIENT
Start: 2018-08-08 | End: 2018-08-27

## 2018-08-08 RX ORDER — ATORVASTATIN CALCIUM 80 MG/1
20 TABLET, FILM COATED ORAL AT BEDTIME
Qty: 0 | Refills: 0 | Status: DISCONTINUED | OUTPATIENT
Start: 2018-08-08 | End: 2018-08-27

## 2018-08-08 RX ORDER — CARVEDILOL PHOSPHATE 80 MG/1
25 CAPSULE, EXTENDED RELEASE ORAL DAILY
Qty: 0 | Refills: 0 | Status: DISCONTINUED | OUTPATIENT
Start: 2018-08-08 | End: 2018-08-09

## 2018-08-08 RX ORDER — DIPHENHYDRAMINE HCL 50 MG
50 CAPSULE ORAL EVERY 6 HOURS
Qty: 0 | Refills: 0 | Status: DISCONTINUED | OUTPATIENT
Start: 2018-08-08 | End: 2018-08-27

## 2018-08-08 RX ORDER — INSULIN LISPRO 100/ML
VIAL (ML) SUBCUTANEOUS
Qty: 0 | Refills: 0 | Status: DISCONTINUED | OUTPATIENT
Start: 2018-08-08 | End: 2018-08-16

## 2018-08-08 RX ORDER — QUETIAPINE FUMARATE 200 MG/1
400 TABLET, FILM COATED ORAL AT BEDTIME
Qty: 0 | Refills: 0 | Status: DISCONTINUED | OUTPATIENT
Start: 2018-08-08 | End: 2018-08-09

## 2018-08-08 RX ADMIN — Medication 50 MILLIGRAM(S): at 23:10

## 2018-08-08 RX ADMIN — Medication 1 MILLIGRAM(S): at 22:26

## 2018-08-08 RX ADMIN — HALOPERIDOL DECANOATE 5 MILLIGRAM(S): 100 INJECTION INTRAMUSCULAR at 23:10

## 2018-08-08 RX ADMIN — LAMOTRIGINE 100 MILLIGRAM(S): 25 TABLET, ORALLY DISINTEGRATING ORAL at 22:25

## 2018-08-08 RX ADMIN — ATORVASTATIN CALCIUM 20 MILLIGRAM(S): 80 TABLET, FILM COATED ORAL at 22:23

## 2018-08-08 RX ADMIN — METFORMIN HYDROCHLORIDE 1000 MILLIGRAM(S): 850 TABLET ORAL at 22:25

## 2018-08-08 RX ADMIN — QUETIAPINE FUMARATE 400 MILLIGRAM(S): 200 TABLET, FILM COATED ORAL at 22:25

## 2018-08-08 RX ADMIN — PANTOPRAZOLE SODIUM 40 MILLIGRAM(S): 20 TABLET, DELAYED RELEASE ORAL at 23:10
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
## 2018-08-08 NOTE — ED PROVIDER NOTE - OBJECTIVE STATEMENT
58F suicidal thoughts, no plan.  Seen here last night for similar and d/c.  No HI.  Feeling depressed her whole life after death of parents?, Son called EMS – she reports still had suicidal ideation despite hospital visit yesterday.  Reports take seroquel.   Pt denies SI here, denies illness or injury, reports feels okay.   FS elevated in triage 236, will check labs incl blood gas, rx po fluids, psych eval.  Exam benign.

## 2018-08-08 NOTE — ED BEHAVIORAL HEALTH ASSESSMENT NOTE - PRIMARY DX
Bipolar disorder, current episode mixed, severe, with psychotic features Deferred condition on axis II

## 2018-08-08 NOTE — ED ADULT NURSE NOTE - NSIMPLEMENTINTERV_GEN_ALL_ED
Implemented All Universal Safety Interventions:  [place] to call system. Call bell, personal items and telephone within reach. Instruct patient to call for assistance. Room bathroom lighting operational. Non-slip footwear when patient is off stretcher. Physically safe environment: no spills, clutter or unnecessary equipment. Stretcher in lowest position, wheels locked, appropriate side rails in place.

## 2018-08-08 NOTE — ED BEHAVIORAL HEALTH ASSESSMENT NOTE - CURRENT MEDICATION
Lamictal 100 mg qhs and Seroquel 400 mg qhs  glimepiride 4mg qbreakfast, metformin 1000mg lipitor 20mg qhs, Januvia 100mg daily, valsartan 32 mg qd coreg 25mg qd l Lamictal 100 mg qhs and Seroquel 400 mg qhs  glimepiride 4mg qbreakfast, metformin 1000mg lipitor 20mg qhs, Januvia 100mg daily, valsartan 32 mg qd coreg 25mg qd Lamictal 100 mg qhs and Seroquel 400 mg qhs  glimepiride 4mg qbreakfast, metformin 1000mg lipitor 20mg qhs, Januvia 100mg daily, valsartan 320 mg qd coreg 25mg qd

## 2018-08-08 NOTE — ED BEHAVIORAL HEALTH ASSESSMENT NOTE - DESCRIPTION
sleep apnea, HLD, DM, OA, HTN, HSV-2 Pt is  F, [place] , [place] age 22, lives with her daughter, son and grandson irritable, yelling profanities, severely agitated and required IM medication  ICU Vital Signs Last 24 Hrs  T(C): 36.9 (08 Aug 2018 15:05), Max: 36.9 (08 Aug 2018 15:05)  T(F): 98.5 (08 Aug 2018 15:05), Max: 98.5 (08 Aug 2018 15:05)  HR: 86 (08 Aug 2018 15:05) (86 - 97)  BP: 157/96 (08 Aug 2018 15:05) (148/92 - 157/96)  BP(mean): --  ABP: --  ABP(mean): --  RR: 16 (08 Aug 2018 15:05) (16 - 16)  SpO2: 98% (08 Aug 2018 15:05) (98% - 98%)

## 2018-08-08 NOTE — ED BEHAVIORAL HEALTH ASSESSMENT NOTE - RISK ASSESSMENT
Patient has acute risks including worsening affective instability and impulsivity, recent aggression, not able to maintain behavioral control in ED, questionable compliance w treatement, recent poor judgement, along w chronic baseline risks including past psych admission. Pt poses and imminent danger to self as she is not caring for self, and also poses a danger to others, she refuses voluntary admission and will be admitted 9.39 for treatment and stabilization. Transfer to [facility] with EMS and buckle guard, to be released on arrival. No need for 1;1 CO in locked supervised unit as denies suicidal or homicidal intent.

## 2018-08-08 NOTE — ED PROVIDER NOTE - PROGRESS NOTE DETAILS
DD ED ATTG:  Called by GABRIELA Davenport due to agitation, pt pacing, throwing small objects, yelling, demanding food and water - water provided, pt calmer after oumou for calm; redirectable but easily agitated and demanding to leave this country.  Psych made aware of increased agitation.  If further agitation may need medication.

## 2018-08-08 NOTE — ED BEHAVIORAL HEALTH ASSESSMENT NOTE - MEDICAL ISSUES AND PLAN (INCLUDE STANDING AND PRN MEDICATION)
DM2: continue glimepiride 4mg qbreakfast, metformin 1000mg lipitor 20mg qhs, Januvia 100mg daily, low dose ISS, HTN  valsartan 32 mg qd coreg 25mg qd , sleep apnea-put on sleep l Sleep apnea-non compliant w mask, use sleep log .DM2: continue glimepiride 4mg (unavailable in formulary) c/w metformin 1000mg lipitor 20mg qhs, Januvia 100mg daily, low dose ISS, HTN  valsartan 32 mg qd (will not start as per pharmacy recalled for impurities , start Cozaar instead )coreg 25mg qd , sleep apnea-put on sleep l Sleep apnea-non compliant w mask, use sleep log .DM2:  glimepiride 4mg (unavailable in formulary) c/w metformin 1000mg lipitor 20mg qhs, Januvia 100mg daily, low dose ISS, HTN  valsartan 32 mg qd (will not start as per pharmacy recalled for impurities , start Cozaar instead )coreg 25mg qd , sleep apnea-put on sleep l

## 2018-08-08 NOTE — ED BEHAVIORAL HEALTH ASSESSMENT NOTE - OTHER PAST PSYCHIATRIC HISTORY (INCLUDE DETAILS REGARDING ONSET, COURSE OF ILLNESS, INPATIENT/OUTPATIENT TREATMENT)
Pt has hx of bipolar disorder with multiple admissions > 20 times, pt denies any hx of suicidal behavior or self injurious behavior  - attended the [facility] outpatient clinic, was discharged as did not follow up with treatment
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
## 2018-08-08 NOTE — ED PROVIDER NOTE - PMH
Bipolar mood disorder    Depression    DM (diabetes mellitus)    HTN (hypertension)    Osteoarthritis

## 2018-08-08 NOTE — ED BEHAVIORAL HEALTH ASSESSMENT NOTE - SUMMARY
Pt is a 56 yo Citizen of [place] F, , recently unemployed, domiciled with adult son, daughter, and 7 y/o grandson, hx of bipolar 1 disorder, reported hx of over 20 lifetime inpatient psychiatric hospitalizations 9most recent in [address] 4/4/17-4/26/17 with suicidality; , no known hx of violence/substance abuse, 1 remote self-aborted suicide attempt, significant fam hx of suicide, and complicated  PMhx of sleep apnea, HLD, DM, OA, HTN, HSV-2, pt presents to the ED for the second time in 2 days, BIB EMS activated by son as patient was making suicidal statements today and has been becoming more agitated and not sleeping over past 2 weeks. Today in the ED, patient is belligerent, yelling profanities. The patient is insistent that she wants to go home, she is minimally cooperative with interview, speech is normal in rate, vol and tone, thought processes are disorganized, mood is irritable, affect is labile, she reports paranoid ideation toward family and neighbors, insight is poor, denies SI. Collateral hx is concerning for worsening  affective instability, impulsivity and agitation and aggressive behavior. Dx in keeping with relapse with mixed affective state-bipolar 1 disorder.

## 2018-08-08 NOTE — ED ADULT NURSE NOTE - OBJECTIVE STATEMENT
Pt received in  c/o psych eval, pt calm and cooperative on presentation, denies SI,HI&AH. Pt denies ETOH and substance use. psych eval ongoing
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
## 2018-08-08 NOTE — ED BEHAVIORAL HEALTH ASSESSMENT NOTE - SUICIDE RISK FACTORS
Access to means (pills, firearms, etc.)/Mood episode/Highly impulsive behavior/Agitation/severe anxiety/Unable to engage in safety planning

## 2018-08-08 NOTE — ED PROVIDER NOTE - MEDICAL DECISION MAKING DETAILS
57 y/o F hx Bipolar, HTN, HLD, DM  Labs, Urine Tox/UA, EKG.   Medical evaluation performed. There is no clinical evidence of intoxication or any acute medical problem requiring immediate intervention. Patient is awaiting psychiatric consultation. Final disposition will be determined by psychiatrist.

## 2018-08-08 NOTE — ED PROVIDER NOTE - DISPOSITION TYPE
DISCHARGE
24 yo F with pmhx gallstones presents with lightheadedness. Pt woke up this morning feeling lightheaded and dehydrated, when she got out of bed and poured herself a glass of water she passed out. Pt unsure of what happened  and woke up on the floor a few minutes later. Despite triage note pt states abdominal pain has since resolved.  Pt reports tactile fevers, chills, and also reports sore throat and cough for the past 2 days.  Pt otherwsie denies headaches, changes in vision, nausea, vomiting, dysuria, abdominal pain, fatigue, musculoskeletal pain
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
## 2018-08-09 LAB
CHOLEST SERPL-MCNC: 199 MG/DL — SIGNIFICANT CHANGE UP (ref 120–199)
GLUCOSE BLDC GLUCOMTR-MCNC: 165 MG/DL — HIGH (ref 70–99)
GLUCOSE BLDC GLUCOMTR-MCNC: 172 MG/DL — HIGH (ref 70–99)
GLUCOSE BLDC GLUCOMTR-MCNC: 178 MG/DL — HIGH (ref 70–99)
GLUCOSE BLDC GLUCOMTR-MCNC: 268 MG/DL — HIGH (ref 70–99)
HBA1C BLD-MCNC: 8.9 % — HIGH (ref 4–5.6)
HDLC SERPL-MCNC: 60 MG/DL — SIGNIFICANT CHANGE UP (ref 45–65)
LIPID PNL WITH DIRECT LDL SERPL: 119 MG/DL — SIGNIFICANT CHANGE UP
TRIGL SERPL-MCNC: 138 MG/DL — SIGNIFICANT CHANGE UP (ref 10–149)

## 2018-08-09 PROCEDURE — 93010 ELECTROCARDIOGRAM REPORT: CPT

## 2018-08-09 PROCEDURE — 99222 1ST HOSP IP/OBS MODERATE 55: CPT | Mod: GC

## 2018-08-09 RX ORDER — AMLODIPINE BESYLATE 2.5 MG/1
10 TABLET ORAL DAILY
Qty: 0 | Refills: 0 | Status: DISCONTINUED | OUTPATIENT
Start: 2018-08-09 | End: 2018-08-27

## 2018-08-09 RX ORDER — CARVEDILOL PHOSPHATE 80 MG/1
6.25 CAPSULE, EXTENDED RELEASE ORAL
Qty: 0 | Refills: 0 | Status: DISCONTINUED | OUTPATIENT
Start: 2018-08-09 | End: 2018-08-27

## 2018-08-09 RX ORDER — QUETIAPINE FUMARATE 200 MG/1
200 TABLET, FILM COATED ORAL AT BEDTIME
Qty: 0 | Refills: 0 | Status: DISCONTINUED | OUTPATIENT
Start: 2018-08-09 | End: 2018-08-10

## 2018-08-09 RX ADMIN — METFORMIN HYDROCHLORIDE 1000 MILLIGRAM(S): 850 TABLET ORAL at 20:23

## 2018-08-09 RX ADMIN — LAMOTRIGINE 100 MILLIGRAM(S): 25 TABLET, ORALLY DISINTEGRATING ORAL at 20:23

## 2018-08-09 RX ADMIN — QUETIAPINE FUMARATE 200 MILLIGRAM(S): 200 TABLET, FILM COATED ORAL at 20:23

## 2018-08-09 RX ADMIN — ATORVASTATIN CALCIUM 20 MILLIGRAM(S): 80 TABLET, FILM COATED ORAL at 20:23

## 2018-08-09 RX ADMIN — CARVEDILOL PHOSPHATE 6.25 MILLIGRAM(S): 80 CAPSULE, EXTENDED RELEASE ORAL at 21:48

## 2018-08-09 RX ADMIN — Medication 1: at 12:20

## 2018-08-09 RX ADMIN — Medication 1: at 16:50

## 2018-08-09 RX ADMIN — PANTOPRAZOLE SODIUM 40 MILLIGRAM(S): 20 TABLET, DELAYED RELEASE ORAL at 08:45

## 2018-08-09 RX ADMIN — METFORMIN HYDROCHLORIDE 1000 MILLIGRAM(S): 850 TABLET ORAL at 08:45

## 2018-08-09 RX ADMIN — Medication 1: at 21:52

## 2018-08-09 RX ADMIN — LOSARTAN POTASSIUM 100 MILLIGRAM(S): 100 TABLET, FILM COATED ORAL at 08:45

## 2018-08-09 RX ADMIN — Medication 1: at 08:45

## 2018-08-10 DIAGNOSIS — Z71.89 OTHER SPECIFIED COUNSELING: ICD-10-CM

## 2018-08-10 LAB
GLUCOSE BLDC GLUCOMTR-MCNC: 164 MG/DL — HIGH (ref 70–99)
GLUCOSE BLDC GLUCOMTR-MCNC: 191 MG/DL — HIGH (ref 70–99)
GLUCOSE BLDC GLUCOMTR-MCNC: 196 MG/DL — HIGH (ref 70–99)

## 2018-08-10 PROCEDURE — 99232 SBSQ HOSP IP/OBS MODERATE 35: CPT | Mod: GC

## 2018-08-10 RX ORDER — QUETIAPINE FUMARATE 200 MG/1
250 TABLET, FILM COATED ORAL AT BEDTIME
Qty: 0 | Refills: 0 | Status: COMPLETED | OUTPATIENT
Start: 2018-08-10 | End: 2018-08-11

## 2018-08-10 RX ORDER — QUETIAPINE FUMARATE 200 MG/1
250 TABLET, FILM COATED ORAL AT BEDTIME
Qty: 0 | Refills: 0 | Status: DISCONTINUED | OUTPATIENT
Start: 2018-08-10 | End: 2018-08-10

## 2018-08-10 RX ORDER — QUETIAPINE FUMARATE 200 MG/1
300 TABLET, FILM COATED ORAL AT BEDTIME
Qty: 0 | Refills: 0 | Status: DISCONTINUED | OUTPATIENT
Start: 2018-08-12 | End: 2018-08-14

## 2018-08-10 RX ADMIN — Medication 1: at 16:45

## 2018-08-10 RX ADMIN — LAMOTRIGINE 100 MILLIGRAM(S): 25 TABLET, ORALLY DISINTEGRATING ORAL at 22:39

## 2018-08-10 RX ADMIN — CARVEDILOL PHOSPHATE 6.25 MILLIGRAM(S): 80 CAPSULE, EXTENDED RELEASE ORAL at 22:37

## 2018-08-10 RX ADMIN — Medication 1 APPLICATORFUL: at 02:26

## 2018-08-10 RX ADMIN — QUETIAPINE FUMARATE 250 MILLIGRAM(S): 200 TABLET, FILM COATED ORAL at 22:39

## 2018-08-10 RX ADMIN — ATORVASTATIN CALCIUM 20 MILLIGRAM(S): 80 TABLET, FILM COATED ORAL at 22:37

## 2018-08-10 RX ADMIN — METFORMIN HYDROCHLORIDE 1000 MILLIGRAM(S): 850 TABLET ORAL at 08:16

## 2018-08-10 RX ADMIN — Medication 1: at 08:06

## 2018-08-10 RX ADMIN — LOSARTAN POTASSIUM 100 MILLIGRAM(S): 100 TABLET, FILM COATED ORAL at 08:16

## 2018-08-10 RX ADMIN — Medication 1 MILLIGRAM(S): at 23:52

## 2018-08-10 RX ADMIN — Medication 1 APPLICATORFUL: at 22:37

## 2018-08-10 RX ADMIN — Medication 1: at 12:07

## 2018-08-10 RX ADMIN — CARVEDILOL PHOSPHATE 6.25 MILLIGRAM(S): 80 CAPSULE, EXTENDED RELEASE ORAL at 08:15

## 2018-08-10 RX ADMIN — PANTOPRAZOLE SODIUM 40 MILLIGRAM(S): 20 TABLET, DELAYED RELEASE ORAL at 08:16

## 2018-08-10 RX ADMIN — Medication 50 MILLIGRAM(S): at 23:52

## 2018-08-10 RX ADMIN — HALOPERIDOL DECANOATE 5 MILLIGRAM(S): 100 INJECTION INTRAMUSCULAR at 23:52

## 2018-08-10 RX ADMIN — AMLODIPINE BESYLATE 10 MILLIGRAM(S): 2.5 TABLET ORAL at 08:15

## 2018-08-10 RX ADMIN — METFORMIN HYDROCHLORIDE 1000 MILLIGRAM(S): 850 TABLET ORAL at 22:39

## 2018-08-11 LAB
GLUCOSE BLDC GLUCOMTR-MCNC: 130 MG/DL — HIGH (ref 70–99)
GLUCOSE BLDC GLUCOMTR-MCNC: 165 MG/DL — HIGH (ref 70–99)
GLUCOSE BLDC GLUCOMTR-MCNC: 187 MG/DL — HIGH (ref 70–99)
GLUCOSE BLDC GLUCOMTR-MCNC: 255 MG/DL — HIGH (ref 70–99)

## 2018-08-11 PROCEDURE — 99231 SBSQ HOSP IP/OBS SF/LOW 25: CPT

## 2018-08-11 RX ADMIN — METFORMIN HYDROCHLORIDE 1000 MILLIGRAM(S): 850 TABLET ORAL at 20:32

## 2018-08-11 RX ADMIN — Medication 30 MILLILITER(S): at 19:15

## 2018-08-11 RX ADMIN — QUETIAPINE FUMARATE 250 MILLIGRAM(S): 200 TABLET, FILM COATED ORAL at 20:33

## 2018-08-11 RX ADMIN — Medication 1 APPLICATORFUL: at 22:31

## 2018-08-11 RX ADMIN — METFORMIN HYDROCHLORIDE 1000 MILLIGRAM(S): 850 TABLET ORAL at 10:27

## 2018-08-11 RX ADMIN — PANTOPRAZOLE SODIUM 40 MILLIGRAM(S): 20 TABLET, DELAYED RELEASE ORAL at 10:27

## 2018-08-11 RX ADMIN — ATORVASTATIN CALCIUM 20 MILLIGRAM(S): 80 TABLET, FILM COATED ORAL at 20:32

## 2018-08-11 RX ADMIN — LAMOTRIGINE 100 MILLIGRAM(S): 25 TABLET, ORALLY DISINTEGRATING ORAL at 20:32

## 2018-08-11 RX ADMIN — CARVEDILOL PHOSPHATE 6.25 MILLIGRAM(S): 80 CAPSULE, EXTENDED RELEASE ORAL at 20:32

## 2018-08-11 RX ADMIN — CARVEDILOL PHOSPHATE 6.25 MILLIGRAM(S): 80 CAPSULE, EXTENDED RELEASE ORAL at 10:27

## 2018-08-11 RX ADMIN — LOSARTAN POTASSIUM 100 MILLIGRAM(S): 100 TABLET, FILM COATED ORAL at 10:27

## 2018-08-11 RX ADMIN — AMLODIPINE BESYLATE 10 MILLIGRAM(S): 2.5 TABLET ORAL at 10:27

## 2018-08-11 RX ADMIN — Medication 3: at 12:33

## 2018-08-12 LAB
GLUCOSE BLDC GLUCOMTR-MCNC: 170 MG/DL — HIGH (ref 70–99)
GLUCOSE BLDC GLUCOMTR-MCNC: 200 MG/DL — HIGH (ref 70–99)
GLUCOSE BLDC GLUCOMTR-MCNC: 204 MG/DL — HIGH (ref 70–99)
GLUCOSE BLDC GLUCOMTR-MCNC: 242 MG/DL — HIGH (ref 70–99)

## 2018-08-12 PROCEDURE — 99231 SBSQ HOSP IP/OBS SF/LOW 25: CPT

## 2018-08-12 RX ORDER — PHENYLEPHRINE-SHARK LIVER OIL-MINERAL OIL-PETROLATUM RECTAL OINTMENT
1 OINTMENT (GRAM) RECTAL
Qty: 0 | Refills: 0 | Status: DISCONTINUED | OUTPATIENT
Start: 2018-08-12 | End: 2018-08-27

## 2018-08-12 RX ADMIN — METFORMIN HYDROCHLORIDE 1000 MILLIGRAM(S): 850 TABLET ORAL at 08:41

## 2018-08-12 RX ADMIN — METFORMIN HYDROCHLORIDE 1000 MILLIGRAM(S): 850 TABLET ORAL at 21:26

## 2018-08-12 RX ADMIN — PANTOPRAZOLE SODIUM 40 MILLIGRAM(S): 20 TABLET, DELAYED RELEASE ORAL at 08:41

## 2018-08-12 RX ADMIN — CARVEDILOL PHOSPHATE 6.25 MILLIGRAM(S): 80 CAPSULE, EXTENDED RELEASE ORAL at 21:26

## 2018-08-12 RX ADMIN — PHENYLEPHRINE-SHARK LIVER OIL-MINERAL OIL-PETROLATUM RECTAL OINTMENT 1 APPLICATION(S): at 10:24

## 2018-08-12 RX ADMIN — Medication 2: at 17:14

## 2018-08-12 RX ADMIN — Medication 2: at 12:25

## 2018-08-12 RX ADMIN — ATORVASTATIN CALCIUM 20 MILLIGRAM(S): 80 TABLET, FILM COATED ORAL at 21:26

## 2018-08-12 RX ADMIN — LAMOTRIGINE 100 MILLIGRAM(S): 25 TABLET, ORALLY DISINTEGRATING ORAL at 21:26

## 2018-08-12 RX ADMIN — Medication 1 APPLICATORFUL: at 22:32

## 2018-08-12 RX ADMIN — CARVEDILOL PHOSPHATE 6.25 MILLIGRAM(S): 80 CAPSULE, EXTENDED RELEASE ORAL at 08:42

## 2018-08-12 RX ADMIN — Medication 1: at 08:26

## 2018-08-12 RX ADMIN — LOSARTAN POTASSIUM 100 MILLIGRAM(S): 100 TABLET, FILM COATED ORAL at 08:42

## 2018-08-12 RX ADMIN — QUETIAPINE FUMARATE 300 MILLIGRAM(S): 200 TABLET, FILM COATED ORAL at 22:08

## 2018-08-12 RX ADMIN — AMLODIPINE BESYLATE 10 MILLIGRAM(S): 2.5 TABLET ORAL at 08:42

## 2018-08-13 LAB
GLUCOSE BLDC GLUCOMTR-MCNC: 158 MG/DL — HIGH (ref 70–99)
GLUCOSE BLDC GLUCOMTR-MCNC: 165 MG/DL — HIGH (ref 70–99)
GLUCOSE BLDC GLUCOMTR-MCNC: 194 MG/DL — HIGH (ref 70–99)
GLUCOSE BLDC GLUCOMTR-MCNC: 195 MG/DL — HIGH (ref 70–99)

## 2018-08-13 PROCEDURE — 99232 SBSQ HOSP IP/OBS MODERATE 35: CPT

## 2018-08-13 RX ORDER — LOPERAMIDE HCL 2 MG
2 TABLET ORAL
Qty: 0 | Refills: 0 | Status: DISCONTINUED | OUTPATIENT
Start: 2018-08-13 | End: 2018-08-27

## 2018-08-13 RX ADMIN — METFORMIN HYDROCHLORIDE 1000 MILLIGRAM(S): 850 TABLET ORAL at 08:45

## 2018-08-13 RX ADMIN — Medication 1 MILLIGRAM(S): at 21:00

## 2018-08-13 RX ADMIN — Medication 1 APPLICATORFUL: at 20:50

## 2018-08-13 RX ADMIN — METFORMIN HYDROCHLORIDE 1000 MILLIGRAM(S): 850 TABLET ORAL at 20:50

## 2018-08-13 RX ADMIN — QUETIAPINE FUMARATE 300 MILLIGRAM(S): 200 TABLET, FILM COATED ORAL at 20:50

## 2018-08-13 RX ADMIN — Medication 1: at 11:59

## 2018-08-13 RX ADMIN — HALOPERIDOL DECANOATE 5 MILLIGRAM(S): 100 INJECTION INTRAMUSCULAR at 13:43

## 2018-08-13 RX ADMIN — PANTOPRAZOLE SODIUM 40 MILLIGRAM(S): 20 TABLET, DELAYED RELEASE ORAL at 08:45

## 2018-08-13 RX ADMIN — LAMOTRIGINE 100 MILLIGRAM(S): 25 TABLET, ORALLY DISINTEGRATING ORAL at 20:50

## 2018-08-13 RX ADMIN — CARVEDILOL PHOSPHATE 6.25 MILLIGRAM(S): 80 CAPSULE, EXTENDED RELEASE ORAL at 20:50

## 2018-08-13 RX ADMIN — AMLODIPINE BESYLATE 10 MILLIGRAM(S): 2.5 TABLET ORAL at 08:43

## 2018-08-13 RX ADMIN — ATORVASTATIN CALCIUM 20 MILLIGRAM(S): 80 TABLET, FILM COATED ORAL at 20:50

## 2018-08-13 RX ADMIN — CARVEDILOL PHOSPHATE 6.25 MILLIGRAM(S): 80 CAPSULE, EXTENDED RELEASE ORAL at 08:43

## 2018-08-13 RX ADMIN — Medication 50 MILLIGRAM(S): at 13:43

## 2018-08-13 RX ADMIN — Medication 1: at 16:32

## 2018-08-13 RX ADMIN — Medication 1: at 08:25

## 2018-08-13 RX ADMIN — Medication 1 MILLIGRAM(S): at 13:43

## 2018-08-13 RX ADMIN — LOSARTAN POTASSIUM 100 MILLIGRAM(S): 100 TABLET, FILM COATED ORAL at 08:45

## 2018-08-14 DIAGNOSIS — Z71.89 OTHER SPECIFIED COUNSELING: ICD-10-CM

## 2018-08-14 LAB
GLUCOSE BLDC GLUCOMTR-MCNC: 148 MG/DL — HIGH (ref 70–99)
GLUCOSE BLDC GLUCOMTR-MCNC: 152 MG/DL — HIGH (ref 70–99)
GLUCOSE BLDC GLUCOMTR-MCNC: 178 MG/DL — HIGH (ref 70–99)
GLUCOSE BLDC GLUCOMTR-MCNC: 196 MG/DL — HIGH (ref 70–99)

## 2018-08-14 PROCEDURE — 90853 GROUP PSYCHOTHERAPY: CPT

## 2018-08-14 PROCEDURE — 99232 SBSQ HOSP IP/OBS MODERATE 35: CPT | Mod: 25,GC

## 2018-08-14 RX ORDER — QUETIAPINE FUMARATE 200 MG/1
350 TABLET, FILM COATED ORAL AT BEDTIME
Qty: 0 | Refills: 0 | Status: DISCONTINUED | OUTPATIENT
Start: 2018-08-14 | End: 2018-08-15

## 2018-08-14 RX ADMIN — QUETIAPINE FUMARATE 350 MILLIGRAM(S): 200 TABLET, FILM COATED ORAL at 20:49

## 2018-08-14 RX ADMIN — LAMOTRIGINE 100 MILLIGRAM(S): 25 TABLET, ORALLY DISINTEGRATING ORAL at 20:49

## 2018-08-14 RX ADMIN — Medication 1 MILLIGRAM(S): at 22:45

## 2018-08-14 RX ADMIN — Medication 1: at 16:31

## 2018-08-14 RX ADMIN — CARVEDILOL PHOSPHATE 6.25 MILLIGRAM(S): 80 CAPSULE, EXTENDED RELEASE ORAL at 08:49

## 2018-08-14 RX ADMIN — METFORMIN HYDROCHLORIDE 1000 MILLIGRAM(S): 850 TABLET ORAL at 08:50

## 2018-08-14 RX ADMIN — AMLODIPINE BESYLATE 10 MILLIGRAM(S): 2.5 TABLET ORAL at 08:49

## 2018-08-14 RX ADMIN — CARVEDILOL PHOSPHATE 6.25 MILLIGRAM(S): 80 CAPSULE, EXTENDED RELEASE ORAL at 20:48

## 2018-08-14 RX ADMIN — ATORVASTATIN CALCIUM 20 MILLIGRAM(S): 80 TABLET, FILM COATED ORAL at 20:48

## 2018-08-14 RX ADMIN — Medication 1 APPLICATORFUL: at 20:48

## 2018-08-14 RX ADMIN — LOSARTAN POTASSIUM 100 MILLIGRAM(S): 100 TABLET, FILM COATED ORAL at 08:50

## 2018-08-14 RX ADMIN — PANTOPRAZOLE SODIUM 40 MILLIGRAM(S): 20 TABLET, DELAYED RELEASE ORAL at 08:50

## 2018-08-14 RX ADMIN — Medication 1: at 08:30

## 2018-08-14 RX ADMIN — METFORMIN HYDROCHLORIDE 1000 MILLIGRAM(S): 850 TABLET ORAL at 20:49

## 2018-08-15 LAB
GLUCOSE BLDC GLUCOMTR-MCNC: 186 MG/DL — HIGH (ref 70–99)
GLUCOSE BLDC GLUCOMTR-MCNC: 220 MG/DL — HIGH (ref 70–99)
GLUCOSE BLDC GLUCOMTR-MCNC: 226 MG/DL — HIGH (ref 70–99)
GLUCOSE BLDC GLUCOMTR-MCNC: 246 MG/DL — HIGH (ref 70–99)

## 2018-08-15 PROCEDURE — 99232 SBSQ HOSP IP/OBS MODERATE 35: CPT | Mod: GC

## 2018-08-15 RX ORDER — QUETIAPINE FUMARATE 200 MG/1
400 TABLET, FILM COATED ORAL AT BEDTIME
Qty: 0 | Refills: 0 | Status: DISCONTINUED | OUTPATIENT
Start: 2018-08-15 | End: 2018-08-24

## 2018-08-15 RX ADMIN — QUETIAPINE FUMARATE 400 MILLIGRAM(S): 200 TABLET, FILM COATED ORAL at 23:13

## 2018-08-15 RX ADMIN — CARVEDILOL PHOSPHATE 6.25 MILLIGRAM(S): 80 CAPSULE, EXTENDED RELEASE ORAL at 08:11

## 2018-08-15 RX ADMIN — Medication 2: at 12:12

## 2018-08-15 RX ADMIN — LOSARTAN POTASSIUM 100 MILLIGRAM(S): 100 TABLET, FILM COATED ORAL at 08:11

## 2018-08-15 RX ADMIN — Medication 2 MILLIGRAM(S): at 16:57

## 2018-08-15 RX ADMIN — ATORVASTATIN CALCIUM 20 MILLIGRAM(S): 80 TABLET, FILM COATED ORAL at 22:13

## 2018-08-15 RX ADMIN — AMLODIPINE BESYLATE 10 MILLIGRAM(S): 2.5 TABLET ORAL at 08:11

## 2018-08-15 RX ADMIN — CARVEDILOL PHOSPHATE 6.25 MILLIGRAM(S): 80 CAPSULE, EXTENDED RELEASE ORAL at 23:00

## 2018-08-15 RX ADMIN — Medication 2: at 16:57

## 2018-08-15 RX ADMIN — Medication 1 APPLICATORFUL: at 22:13

## 2018-08-15 RX ADMIN — PANTOPRAZOLE SODIUM 40 MILLIGRAM(S): 20 TABLET, DELAYED RELEASE ORAL at 08:12

## 2018-08-15 RX ADMIN — METFORMIN HYDROCHLORIDE 1000 MILLIGRAM(S): 850 TABLET ORAL at 08:12

## 2018-08-15 RX ADMIN — Medication 1: at 08:23

## 2018-08-15 RX ADMIN — METFORMIN HYDROCHLORIDE 1000 MILLIGRAM(S): 850 TABLET ORAL at 22:13

## 2018-08-15 RX ADMIN — LAMOTRIGINE 100 MILLIGRAM(S): 25 TABLET, ORALLY DISINTEGRATING ORAL at 22:13

## 2018-08-16 LAB
GLUCOSE BLDC GLUCOMTR-MCNC: 156 MG/DL — HIGH (ref 70–99)
GLUCOSE BLDC GLUCOMTR-MCNC: 192 MG/DL — HIGH (ref 70–99)
GLUCOSE BLDC GLUCOMTR-MCNC: 210 MG/DL — HIGH (ref 70–99)
GLUCOSE BLDC GLUCOMTR-MCNC: 290 MG/DL — HIGH (ref 70–99)

## 2018-08-16 PROCEDURE — 99232 SBSQ HOSP IP/OBS MODERATE 35: CPT | Mod: GC

## 2018-08-16 PROCEDURE — 99223 1ST HOSP IP/OBS HIGH 75: CPT

## 2018-08-16 RX ORDER — INSULIN LISPRO 100/ML
VIAL (ML) SUBCUTANEOUS
Qty: 0 | Refills: 0 | Status: DISCONTINUED | OUTPATIENT
Start: 2018-08-16 | End: 2018-08-27

## 2018-08-16 RX ORDER — INSULIN LISPRO 100/ML
VIAL (ML) SUBCUTANEOUS AT BEDTIME
Qty: 0 | Refills: 0 | Status: DISCONTINUED | OUTPATIENT
Start: 2018-08-16 | End: 2018-08-27

## 2018-08-16 RX ORDER — IBUPROFEN 200 MG
400 TABLET ORAL THREE TIMES A DAY
Qty: 0 | Refills: 0 | Status: DISCONTINUED | OUTPATIENT
Start: 2018-08-16 | End: 2018-08-27

## 2018-08-16 RX ORDER — INSULIN GLARGINE 100 [IU]/ML
15 INJECTION, SOLUTION SUBCUTANEOUS AT BEDTIME
Qty: 0 | Refills: 0 | Status: DISCONTINUED | OUTPATIENT
Start: 2018-08-16 | End: 2018-08-20

## 2018-08-16 RX ADMIN — Medication 400 MILLIGRAM(S): at 14:53

## 2018-08-16 RX ADMIN — ATORVASTATIN CALCIUM 20 MILLIGRAM(S): 80 TABLET, FILM COATED ORAL at 21:16

## 2018-08-16 RX ADMIN — CARVEDILOL PHOSPHATE 6.25 MILLIGRAM(S): 80 CAPSULE, EXTENDED RELEASE ORAL at 21:16

## 2018-08-16 RX ADMIN — Medication 4: at 12:22

## 2018-08-16 RX ADMIN — INSULIN GLARGINE 15 UNIT(S): 100 INJECTION, SOLUTION SUBCUTANEOUS at 21:22

## 2018-08-16 RX ADMIN — QUETIAPINE FUMARATE 400 MILLIGRAM(S): 200 TABLET, FILM COATED ORAL at 21:17

## 2018-08-16 RX ADMIN — Medication 2 MILLIGRAM(S): at 08:00

## 2018-08-16 RX ADMIN — CARVEDILOL PHOSPHATE 6.25 MILLIGRAM(S): 80 CAPSULE, EXTENDED RELEASE ORAL at 08:30

## 2018-08-16 RX ADMIN — AMLODIPINE BESYLATE 10 MILLIGRAM(S): 2.5 TABLET ORAL at 08:30

## 2018-08-16 RX ADMIN — Medication 400 MILLIGRAM(S): at 13:23

## 2018-08-16 RX ADMIN — PANTOPRAZOLE SODIUM 40 MILLIGRAM(S): 20 TABLET, DELAYED RELEASE ORAL at 07:36

## 2018-08-16 RX ADMIN — LAMOTRIGINE 100 MILLIGRAM(S): 25 TABLET, ORALLY DISINTEGRATING ORAL at 21:18

## 2018-08-16 RX ADMIN — Medication 2 MILLIGRAM(S): at 02:29

## 2018-08-16 RX ADMIN — Medication 3: at 07:54

## 2018-08-16 RX ADMIN — LOSARTAN POTASSIUM 100 MILLIGRAM(S): 100 TABLET, FILM COATED ORAL at 08:30

## 2018-08-16 RX ADMIN — Medication 2: at 16:19

## 2018-08-16 NOTE — CONSULT NOTE ADULT - SUBJECTIVE AND OBJECTIVE BOX
HPI: Pt is 58F admitted to [facility] 8/8/18 for management of bipolar disorder.  She reports that she has been having a lot of diarrhea for that past 5 days ever since she ate an egg that did not agree with her.  She would normally use a home remedy (e.g. oregano tea with salt) to fix the problem but she can't get that here.  She also states that metformin gives her diarrhea and she has been given that here, but her doctor stopped metformin and prescribed insulin.  However, she never took the insulin because she didn't have needles and didn't feel like she knew how to use the insulin pen.     PAST MEDICAL & SURGICAL HISTORY:  Osteoarthritis  HTN (hypertension)  DM (diabetes mellitus) type 2  Depression  No significant past surgical history      Review of Systems:   CONSTITUTIONAL: No fever, weight loss, or fatigue  EYES: No eye pain, visual disturbances, or discharge  ENMT:  No difficulty hearing, tinnitus, vertigo; No sinus or throat pain  NECK: No pain or stiffness  RESPIRATORY: No cough, wheezing, chills or hemoptysis; No shortness of breath  CARDIOVASCULAR: No chest pain, palpitations, dizziness, or leg swelling  GASTROINTESTINAL: No abdominal or epigastric pain. No nausea, vomiting, or hematemesis; No diarrhea or constipation. No melena or hematochezia.  GENITOURINARY: No dysuria, frequency, hematuria, or incontinence  NEUROLOGICAL: No headaches, memory loss, loss of strength, numbness, or tremors  SKIN: No itching, burning, rashes, or lesions   LYMPH NODES: No enlarged glands  ENDOCRINE: No heat or cold intolerance; No hair loss  MUSCULOSKELETAL: No joint pain or swelling; No muscle, back, or extremity pain  HEME/LYMPH: No easy bruising, or bleeding gums  ALLERY AND IMMUNOLOGIC: No hives or eczema    Allergies    No Known Allergies    Intolerances  metformin (diarrhea)        Social History: denies etoh tob idu    FAMILY HISTORY:  No pertinent family history in first degree relatives      MEDICATIONS  (STANDING):  amLODIPine   Tablet 10 milliGRAM(s) Oral daily  atorvastatin 20 milliGRAM(s) Oral at bedtime  carvedilol 6.25 milliGRAM(s) Oral two times a day  dapagliflozin (Farxiga)  5mg/tab 2 Tablet(s) 2 Tablet(s) Oral daily  insulin glargine Injectable (LANTUS) 15 Unit(s) SubCutaneous at bedtime  insulin lispro (HumaLOG) corrective regimen sliding scale   SubCutaneous three times a day before meals  insulin lispro (HumaLOG) corrective regimen sliding scale   SubCutaneous at bedtime  lamoTRIgine 100 milliGRAM(s) Oral at bedtime  losartan 100 milliGRAM(s) Oral daily  pantoprazole    Tablet 40 milliGRAM(s) Oral daily  QUEtiapine 400 milliGRAM(s) Oral at bedtime  sitaGLIPtin 100 milliGRAM(s) Oral daily    MEDICATIONS  (PRN):  dextrose 40% Gel 15 Gram(s) Oral once PRN Blood Glucose LESS THAN 70 milliGRAM(s)/deciliter  diphenhydrAMINE   Capsule 50 milliGRAM(s) Oral every 6 hours PRN Extrapyramidal prophylaxis  diphenhydrAMINE   Injectable 50 milliGRAM(s) IV Push Once PRN Extrapyramidal prophylaxis  haloperidol     Tablet 5 milliGRAM(s) Oral every 6 hours PRN agitation  haloperidol    Injectable 5 milliGRAM(s) IntraMuscular Once PRN severe agitation  hemorrhoidal Ointment 1 Application(s) Rectal four times a day PRN hemorrhoids  ibuprofen  Tablet 400 milliGRAM(s) Oral three times a day PRN pain  loperamide 2 milliGRAM(s) Oral four times a day PRN Diarrhea      Vital Signs Last 24 Hrs  T(C): 36.1 (16 Aug 2018 06:30), Max: 36.6 (15 Aug 2018 20:54)  T(F): 96.9 (16 Aug 2018 06:30), Max: 97.8 (15 Aug 2018 20:54)  HR: 84 (16 Aug 2018 06:30) (84 - 84)  BP: 110/75 (16 Aug 2018 06:30) (110/75 - 110/75)  BP(mean): --  RR: 15  SpO2: --  CAPILLARY BLOOD GLUCOSE      POCT Blood Glucose.: 210 mg/dL (16 Aug 2018 11:34)  POCT Blood Glucose.: 290 mg/dL (16 Aug 2018 07:46)  POCT Blood Glucose.: 226 mg/dL (15 Aug 2018 22:16)  POCT Blood Glucose.: 220 mg/dL (15 Aug 2018 16:03)        PHYSICAL EXAM:  GENERAL: NAD, well-developed  HEAD:  Atraumatic, Normocephalic  EYES: EOMI, conjunctiva and sclera clear  NECK: Supple, No JVD  CHEST/LUNG: Clear to auscultation bilaterally; No wheeze  HEART: Regular rate and rhythm; No murmurs, rubs, or gallops  ABDOMEN: Soft, Nontender, Nondistended; Bowel sounds present  EXTREMITIES:  2+ Peripheral Pulses, No clubbing, cyanosis, or edema  NEUROLOGY: non-focal  SKIN: No rashes or lesions    LABS:    reviewed in sunrise

## 2018-08-16 NOTE — CONSULT NOTE ADULT - ASSESSMENT
58F DM2 HTN admitted for management of bipolar disorder    Plan:  DM2, uncontrolled: Discontinue metformin due to intolerance.  Continue farxiga and januvia.  Add lantus 15 units qhs, follow FSBG.  Continue statin for ASCVD prevention.    HTN: Controlled on coreg, losartan, amlodipine.    Diarrhea: Reports that it is improving. May use home tea remedy. Monitor for sx off metformin    Bipolar disorder: Management per primary team

## 2018-08-17 LAB
GLUCOSE BLDC GLUCOMTR-MCNC: 201 MG/DL — HIGH (ref 70–99)
GLUCOSE BLDC GLUCOMTR-MCNC: 229 MG/DL — HIGH (ref 70–99)
GLUCOSE BLDC GLUCOMTR-MCNC: 231 MG/DL — HIGH (ref 70–99)
GLUCOSE BLDC GLUCOMTR-MCNC: 244 MG/DL — HIGH (ref 70–99)

## 2018-08-17 PROCEDURE — 90853 GROUP PSYCHOTHERAPY: CPT

## 2018-08-17 PROCEDURE — 99232 SBSQ HOSP IP/OBS MODERATE 35: CPT | Mod: GC,25

## 2018-08-17 RX ORDER — ACETAMINOPHEN 500 MG
650 TABLET ORAL EVERY 8 HOURS
Qty: 0 | Refills: 0 | Status: DISCONTINUED | OUTPATIENT
Start: 2018-08-17 | End: 2018-08-27

## 2018-08-17 RX ORDER — BENZOCAINE AND MENTHOL 5; 1 G/100ML; G/100ML
1 LIQUID ORAL ONCE
Qty: 0 | Refills: 0 | Status: COMPLETED | OUTPATIENT
Start: 2018-08-17 | End: 2018-08-17

## 2018-08-17 RX ORDER — BENZOCAINE AND MENTHOL 5; 1 G/100ML; G/100ML
1 LIQUID ORAL
Qty: 0 | Refills: 0 | Status: DISCONTINUED | OUTPATIENT
Start: 2018-08-17 | End: 2018-08-27

## 2018-08-17 RX ADMIN — BENZOCAINE AND MENTHOL 1 LOZENGE: 5; 1 LIQUID ORAL at 20:17

## 2018-08-17 RX ADMIN — CARVEDILOL PHOSPHATE 6.25 MILLIGRAM(S): 80 CAPSULE, EXTENDED RELEASE ORAL at 21:14

## 2018-08-17 RX ADMIN — CARVEDILOL PHOSPHATE 6.25 MILLIGRAM(S): 80 CAPSULE, EXTENDED RELEASE ORAL at 10:19

## 2018-08-17 RX ADMIN — Medication 4: at 16:15

## 2018-08-17 RX ADMIN — INSULIN GLARGINE 15 UNIT(S): 100 INJECTION, SOLUTION SUBCUTANEOUS at 21:14

## 2018-08-17 RX ADMIN — LAMOTRIGINE 100 MILLIGRAM(S): 25 TABLET, ORALLY DISINTEGRATING ORAL at 21:14

## 2018-08-17 RX ADMIN — Medication 30 MILLILITER(S): at 22:46

## 2018-08-17 RX ADMIN — AMLODIPINE BESYLATE 10 MILLIGRAM(S): 2.5 TABLET ORAL at 10:19

## 2018-08-17 RX ADMIN — Medication 650 MILLIGRAM(S): at 17:22

## 2018-08-17 RX ADMIN — Medication 50 MILLIGRAM(S): at 22:30

## 2018-08-17 RX ADMIN — QUETIAPINE FUMARATE 400 MILLIGRAM(S): 200 TABLET, FILM COATED ORAL at 21:14

## 2018-08-17 RX ADMIN — BENZOCAINE AND MENTHOL 1 LOZENGE: 5; 1 LIQUID ORAL at 05:33

## 2018-08-17 RX ADMIN — Medication 4: at 12:11

## 2018-08-17 RX ADMIN — ATORVASTATIN CALCIUM 20 MILLIGRAM(S): 80 TABLET, FILM COATED ORAL at 21:14

## 2018-08-17 RX ADMIN — HALOPERIDOL DECANOATE 5 MILLIGRAM(S): 100 INJECTION INTRAMUSCULAR at 22:30

## 2018-08-17 RX ADMIN — LOSARTAN POTASSIUM 100 MILLIGRAM(S): 100 TABLET, FILM COATED ORAL at 10:20

## 2018-08-17 RX ADMIN — PANTOPRAZOLE SODIUM 40 MILLIGRAM(S): 20 TABLET, DELAYED RELEASE ORAL at 10:20

## 2018-08-17 RX ADMIN — Medication 4: at 07:52

## 2018-08-18 LAB
GLUCOSE BLDC GLUCOMTR-MCNC: 206 MG/DL — HIGH (ref 70–99)
GLUCOSE BLDC GLUCOMTR-MCNC: 222 MG/DL — HIGH (ref 70–99)
GLUCOSE BLDC GLUCOMTR-MCNC: 226 MG/DL — HIGH (ref 70–99)
GLUCOSE BLDC GLUCOMTR-MCNC: 261 MG/DL — HIGH (ref 70–99)
GLUCOSE BLDC GLUCOMTR-MCNC: 331 MG/DL — HIGH (ref 70–99)

## 2018-08-18 RX ADMIN — Medication 6: at 11:41

## 2018-08-18 RX ADMIN — AMLODIPINE BESYLATE 10 MILLIGRAM(S): 2.5 TABLET ORAL at 08:54

## 2018-08-18 RX ADMIN — BENZOCAINE AND MENTHOL 1 LOZENGE: 5; 1 LIQUID ORAL at 17:02

## 2018-08-18 RX ADMIN — BENZOCAINE AND MENTHOL 1 LOZENGE: 5; 1 LIQUID ORAL at 09:24

## 2018-08-18 RX ADMIN — CARVEDILOL PHOSPHATE 6.25 MILLIGRAM(S): 80 CAPSULE, EXTENDED RELEASE ORAL at 20:49

## 2018-08-18 RX ADMIN — Medication 4: at 07:51

## 2018-08-18 RX ADMIN — Medication 2 MILLIGRAM(S): at 21:20

## 2018-08-18 RX ADMIN — Medication 4: at 16:45

## 2018-08-18 RX ADMIN — INSULIN GLARGINE 15 UNIT(S): 100 INJECTION, SOLUTION SUBCUTANEOUS at 21:46

## 2018-08-18 RX ADMIN — LOSARTAN POTASSIUM 100 MILLIGRAM(S): 100 TABLET, FILM COATED ORAL at 08:55

## 2018-08-18 RX ADMIN — HALOPERIDOL DECANOATE 5 MILLIGRAM(S): 100 INJECTION INTRAMUSCULAR at 21:03

## 2018-08-18 RX ADMIN — ATORVASTATIN CALCIUM 20 MILLIGRAM(S): 80 TABLET, FILM COATED ORAL at 20:49

## 2018-08-18 RX ADMIN — CARVEDILOL PHOSPHATE 6.25 MILLIGRAM(S): 80 CAPSULE, EXTENDED RELEASE ORAL at 08:55

## 2018-08-18 RX ADMIN — LAMOTRIGINE 100 MILLIGRAM(S): 25 TABLET, ORALLY DISINTEGRATING ORAL at 21:01

## 2018-08-18 RX ADMIN — BENZOCAINE AND MENTHOL 1 LOZENGE: 5; 1 LIQUID ORAL at 21:01

## 2018-08-18 RX ADMIN — PANTOPRAZOLE SODIUM 40 MILLIGRAM(S): 20 TABLET, DELAYED RELEASE ORAL at 08:55

## 2018-08-18 RX ADMIN — Medication 50 MILLIGRAM(S): at 21:03

## 2018-08-18 RX ADMIN — Medication 2 MILLIGRAM(S): at 09:24

## 2018-08-18 RX ADMIN — Medication 400 MILLIGRAM(S): at 21:02

## 2018-08-18 RX ADMIN — QUETIAPINE FUMARATE 400 MILLIGRAM(S): 200 TABLET, FILM COATED ORAL at 21:01

## 2018-08-18 RX ADMIN — Medication 400 MILLIGRAM(S): at 21:50

## 2018-08-19 LAB
GLUCOSE BLDC GLUCOMTR-MCNC: 195 MG/DL — HIGH (ref 70–99)
GLUCOSE BLDC GLUCOMTR-MCNC: 213 MG/DL — HIGH (ref 70–99)
GLUCOSE BLDC GLUCOMTR-MCNC: 223 MG/DL — HIGH (ref 70–99)
GLUCOSE BLDC GLUCOMTR-MCNC: 227 MG/DL — HIGH (ref 70–99)
SPECIMEN SOURCE: SIGNIFICANT CHANGE UP

## 2018-08-19 RX ORDER — SIMETHICONE 80 MG/1
80 TABLET, CHEWABLE ORAL
Qty: 0 | Refills: 0 | Status: DISCONTINUED | OUTPATIENT
Start: 2018-08-19 | End: 2018-08-27

## 2018-08-19 RX ADMIN — BENZOCAINE AND MENTHOL 1 LOZENGE: 5; 1 LIQUID ORAL at 12:45

## 2018-08-19 RX ADMIN — LAMOTRIGINE 100 MILLIGRAM(S): 25 TABLET, ORALLY DISINTEGRATING ORAL at 21:16

## 2018-08-19 RX ADMIN — Medication 400 MILLIGRAM(S): at 21:27

## 2018-08-19 RX ADMIN — CARVEDILOL PHOSPHATE 6.25 MILLIGRAM(S): 80 CAPSULE, EXTENDED RELEASE ORAL at 08:18

## 2018-08-19 RX ADMIN — Medication 2 MILLIGRAM(S): at 08:57

## 2018-08-19 RX ADMIN — LOSARTAN POTASSIUM 100 MILLIGRAM(S): 100 TABLET, FILM COATED ORAL at 08:18

## 2018-08-19 RX ADMIN — Medication 4: at 16:36

## 2018-08-19 RX ADMIN — BENZOCAINE AND MENTHOL 1 LOZENGE: 5; 1 LIQUID ORAL at 15:43

## 2018-08-19 RX ADMIN — Medication 50 MILLIGRAM(S): at 21:27

## 2018-08-19 RX ADMIN — AMLODIPINE BESYLATE 10 MILLIGRAM(S): 2.5 TABLET ORAL at 08:18

## 2018-08-19 RX ADMIN — HALOPERIDOL DECANOATE 5 MILLIGRAM(S): 100 INJECTION INTRAMUSCULAR at 21:27

## 2018-08-19 RX ADMIN — Medication 4: at 12:07

## 2018-08-19 RX ADMIN — SIMETHICONE 80 MILLIGRAM(S): 80 TABLET, CHEWABLE ORAL at 15:43

## 2018-08-19 RX ADMIN — SIMETHICONE 80 MILLIGRAM(S): 80 TABLET, CHEWABLE ORAL at 21:26

## 2018-08-19 RX ADMIN — CARVEDILOL PHOSPHATE 6.25 MILLIGRAM(S): 80 CAPSULE, EXTENDED RELEASE ORAL at 21:15

## 2018-08-19 RX ADMIN — ATORVASTATIN CALCIUM 20 MILLIGRAM(S): 80 TABLET, FILM COATED ORAL at 21:15

## 2018-08-19 RX ADMIN — PANTOPRAZOLE SODIUM 40 MILLIGRAM(S): 20 TABLET, DELAYED RELEASE ORAL at 08:18

## 2018-08-19 RX ADMIN — Medication 2: at 08:17

## 2018-08-19 RX ADMIN — INSULIN GLARGINE 15 UNIT(S): 100 INJECTION, SOLUTION SUBCUTANEOUS at 21:15

## 2018-08-19 RX ADMIN — BENZOCAINE AND MENTHOL 1 LOZENGE: 5; 1 LIQUID ORAL at 08:57

## 2018-08-19 RX ADMIN — QUETIAPINE FUMARATE 400 MILLIGRAM(S): 200 TABLET, FILM COATED ORAL at 21:16

## 2018-08-20 LAB
GLUCOSE BLDC GLUCOMTR-MCNC: 203 MG/DL — HIGH (ref 70–99)
GLUCOSE BLDC GLUCOMTR-MCNC: 223 MG/DL — HIGH (ref 70–99)
GLUCOSE BLDC GLUCOMTR-MCNC: 272 MG/DL — HIGH (ref 70–99)
GLUCOSE BLDC GLUCOMTR-MCNC: 291 MG/DL — HIGH (ref 70–99)

## 2018-08-20 PROCEDURE — 99232 SBSQ HOSP IP/OBS MODERATE 35: CPT | Mod: GC,25

## 2018-08-20 PROCEDURE — 90853 GROUP PSYCHOTHERAPY: CPT

## 2018-08-20 RX ORDER — INSULIN GLARGINE 100 [IU]/ML
20 INJECTION, SOLUTION SUBCUTANEOUS AT BEDTIME
Qty: 0 | Refills: 0 | Status: DISCONTINUED | OUTPATIENT
Start: 2018-08-20 | End: 2018-08-22

## 2018-08-20 RX ORDER — BENZTROPINE MESYLATE 1 MG
1 TABLET ORAL ONCE
Qty: 0 | Refills: 0 | Status: DISCONTINUED | OUTPATIENT
Start: 2018-08-20 | End: 2018-08-20

## 2018-08-20 RX ORDER — LANOLIN ALCOHOL/MO/W.PET/CERES
3 CREAM (GRAM) TOPICAL ONCE
Qty: 0 | Refills: 0 | Status: COMPLETED | OUTPATIENT
Start: 2018-08-20 | End: 2018-08-20

## 2018-08-20 RX ORDER — BENZTROPINE MESYLATE 1 MG
1 TABLET ORAL ONCE
Qty: 0 | Refills: 0 | Status: COMPLETED | OUTPATIENT
Start: 2018-08-20 | End: 2018-08-20

## 2018-08-20 RX ADMIN — Medication 50 MILLIGRAM(S): at 17:40

## 2018-08-20 RX ADMIN — HALOPERIDOL DECANOATE 5 MILLIGRAM(S): 100 INJECTION INTRAMUSCULAR at 17:40

## 2018-08-20 RX ADMIN — PANTOPRAZOLE SODIUM 40 MILLIGRAM(S): 20 TABLET, DELAYED RELEASE ORAL at 09:11

## 2018-08-20 RX ADMIN — BENZOCAINE AND MENTHOL 1 LOZENGE: 5; 1 LIQUID ORAL at 21:18

## 2018-08-20 RX ADMIN — Medication 400 MILLIGRAM(S): at 21:18

## 2018-08-20 RX ADMIN — Medication 400 MILLIGRAM(S): at 23:33

## 2018-08-20 RX ADMIN — LOSARTAN POTASSIUM 100 MILLIGRAM(S): 100 TABLET, FILM COATED ORAL at 09:10

## 2018-08-20 RX ADMIN — Medication 4: at 12:12

## 2018-08-20 RX ADMIN — QUETIAPINE FUMARATE 400 MILLIGRAM(S): 200 TABLET, FILM COATED ORAL at 21:18

## 2018-08-20 RX ADMIN — CARVEDILOL PHOSPHATE 6.25 MILLIGRAM(S): 80 CAPSULE, EXTENDED RELEASE ORAL at 09:10

## 2018-08-20 RX ADMIN — Medication 4: at 16:51

## 2018-08-20 RX ADMIN — INSULIN GLARGINE 20 UNIT(S): 100 INJECTION, SOLUTION SUBCUTANEOUS at 20:43

## 2018-08-20 RX ADMIN — LAMOTRIGINE 100 MILLIGRAM(S): 25 TABLET, ORALLY DISINTEGRATING ORAL at 21:17

## 2018-08-20 RX ADMIN — Medication 6: at 09:51

## 2018-08-20 RX ADMIN — CARVEDILOL PHOSPHATE 6.25 MILLIGRAM(S): 80 CAPSULE, EXTENDED RELEASE ORAL at 21:17

## 2018-08-20 RX ADMIN — AMLODIPINE BESYLATE 10 MILLIGRAM(S): 2.5 TABLET ORAL at 09:10

## 2018-08-20 RX ADMIN — BENZOCAINE AND MENTHOL 1 LOZENGE: 5; 1 LIQUID ORAL at 13:54

## 2018-08-20 RX ADMIN — ATORVASTATIN CALCIUM 20 MILLIGRAM(S): 80 TABLET, FILM COATED ORAL at 21:17

## 2018-08-20 RX ADMIN — Medication 1 MILLIGRAM(S): at 19:04

## 2018-08-20 RX ADMIN — Medication 2: at 20:42

## 2018-08-21 PROBLEM — F31.9 BIPOLAR DISORDER, UNSPECIFIED: Chronic | Status: ACTIVE | Noted: 2018-08-19

## 2018-08-21 LAB
GLUCOSE BLDC GLUCOMTR-MCNC: 218 MG/DL — HIGH (ref 70–99)
GLUCOSE BLDC GLUCOMTR-MCNC: 249 MG/DL — HIGH (ref 70–99)
GLUCOSE BLDC GLUCOMTR-MCNC: 252 MG/DL — HIGH (ref 70–99)
GLUCOSE BLDC GLUCOMTR-MCNC: 273 MG/DL — HIGH (ref 70–99)
O+P SPEC CONC: SIGNIFICANT CHANGE UP
SPECIMEN SOURCE: SIGNIFICANT CHANGE UP
SPECIMEN SOURCE: SIGNIFICANT CHANGE UP
TRI STN SPEC: SIGNIFICANT CHANGE UP

## 2018-08-21 PROCEDURE — 99232 SBSQ HOSP IP/OBS MODERATE 35: CPT | Mod: GC

## 2018-08-21 PROCEDURE — 99233 SBSQ HOSP IP/OBS HIGH 50: CPT

## 2018-08-21 RX ORDER — VALACYCLOVIR 500 MG/1
500 TABLET, FILM COATED ORAL DAILY
Qty: 0 | Refills: 0 | Status: DISCONTINUED | OUTPATIENT
Start: 2018-08-21 | End: 2018-08-27

## 2018-08-21 RX ORDER — QUETIAPINE FUMARATE 200 MG/1
50 TABLET, FILM COATED ORAL DAILY
Qty: 0 | Refills: 0 | Status: DISCONTINUED | OUTPATIENT
Start: 2018-08-21 | End: 2018-08-22

## 2018-08-21 RX ORDER — QUETIAPINE FUMARATE 200 MG/1
25 TABLET, FILM COATED ORAL EVERY 6 HOURS
Qty: 0 | Refills: 0 | Status: DISCONTINUED | OUTPATIENT
Start: 2018-08-21 | End: 2018-08-27

## 2018-08-21 RX ADMIN — QUETIAPINE FUMARATE 50 MILLIGRAM(S): 200 TABLET, FILM COATED ORAL at 12:41

## 2018-08-21 RX ADMIN — Medication 2: at 21:38

## 2018-08-21 RX ADMIN — QUETIAPINE FUMARATE 400 MILLIGRAM(S): 200 TABLET, FILM COATED ORAL at 21:02

## 2018-08-21 RX ADMIN — INSULIN GLARGINE 20 UNIT(S): 100 INJECTION, SOLUTION SUBCUTANEOUS at 21:38

## 2018-08-21 RX ADMIN — Medication 4: at 11:58

## 2018-08-21 RX ADMIN — AMLODIPINE BESYLATE 10 MILLIGRAM(S): 2.5 TABLET ORAL at 09:01

## 2018-08-21 RX ADMIN — Medication 4: at 08:06

## 2018-08-21 RX ADMIN — ATORVASTATIN CALCIUM 20 MILLIGRAM(S): 80 TABLET, FILM COATED ORAL at 21:02

## 2018-08-21 RX ADMIN — Medication 6: at 16:55

## 2018-08-21 RX ADMIN — PANTOPRAZOLE SODIUM 40 MILLIGRAM(S): 20 TABLET, DELAYED RELEASE ORAL at 09:01

## 2018-08-21 RX ADMIN — VALACYCLOVIR 500 MILLIGRAM(S): 500 TABLET, FILM COATED ORAL at 17:59

## 2018-08-21 RX ADMIN — CARVEDILOL PHOSPHATE 6.25 MILLIGRAM(S): 80 CAPSULE, EXTENDED RELEASE ORAL at 21:02

## 2018-08-21 RX ADMIN — CARVEDILOL PHOSPHATE 6.25 MILLIGRAM(S): 80 CAPSULE, EXTENDED RELEASE ORAL at 09:01

## 2018-08-21 RX ADMIN — LOSARTAN POTASSIUM 100 MILLIGRAM(S): 100 TABLET, FILM COATED ORAL at 09:01

## 2018-08-21 RX ADMIN — Medication 3 MILLIGRAM(S): at 00:06

## 2018-08-21 RX ADMIN — LAMOTRIGINE 100 MILLIGRAM(S): 25 TABLET, ORALLY DISINTEGRATING ORAL at 21:02

## 2018-08-21 NOTE — PROGRESS NOTE ADULT - ASSESSMENT
58F DM2 HTN admitted for management of bipolar disorder    Plan:  DM2, uncontrolled: Does not tolerate metformin.  Continue farxiga and januvia.  Increase lantus to 20 units qhs, follow FSBG, will continue to titrate as needed.  Continue statin for ASCVD prevention.    Herpes: Valtrex 500mg daily for suppression of genital herpes.    HTN: Controlled on coreg, losartan, amlodipine.    Diarrhea: Resolved off metformin.    Bipolar disorder: Management per primary team

## 2018-08-21 NOTE — PROGRESS NOTE ADULT - SUBJECTIVE AND OBJECTIVE BOX
CC/Reason for Consult: DM    SUBJECTIVE / OVERNIGHT EVENTS:  Patient requests suppressive anti herpes medication, which she takes daily to prevent outbreaks.  She denies current outbreak.    MEDICATIONS  (STANDING):  amLODIPine   Tablet 10 milliGRAM(s) Oral daily  atorvastatin 20 milliGRAM(s) Oral at bedtime  carvedilol 6.25 milliGRAM(s) Oral two times a day  dapagliflozin (Farxiga)  5mg/tab 2 Tablet(s) 2 Tablet(s) Oral daily  insulin glargine Injectable (LANTUS) 20 Unit(s) SubCutaneous at bedtime  insulin lispro (HumaLOG) corrective regimen sliding scale   SubCutaneous three times a day before meals  insulin lispro (HumaLOG) corrective regimen sliding scale   SubCutaneous at bedtime  lamoTRIgine 100 milliGRAM(s) Oral at bedtime  losartan 100 milliGRAM(s) Oral daily  pantoprazole    Tablet 40 milliGRAM(s) Oral daily  QUEtiapine 400 milliGRAM(s) Oral at bedtime  QUEtiapine 50 milliGRAM(s) Oral daily  sitaGLIPtin 100 milliGRAM(s) Oral daily  valACYclovir 500 milliGRAM(s) Oral daily    MEDICATIONS  (PRN):  acetaminophen   Tablet 650 milliGRAM(s) Oral every 8 hours PRN pain  benzocaine 15 mG/menthol 3.6 mG Lozenge 1 Lozenge Oral four times a day PRN Sore Throat  dextrose 40% Gel 15 Gram(s) Oral once PRN Blood Glucose LESS THAN 70 milliGRAM(s)/deciliter  diphenhydrAMINE   Capsule 50 milliGRAM(s) Oral every 6 hours PRN Extrapyramidal prophylaxis  diphenhydrAMINE   Injectable 50 milliGRAM(s) IV Push Once PRN Extrapyramidal prophylaxis  haloperidol    Injectable 5 milliGRAM(s) IntraMuscular Once PRN severe agitation  hemorrhoidal Ointment 1 Application(s) Rectal four times a day PRN hemorrhoids  ibuprofen  Tablet 400 milliGRAM(s) Oral three times a day PRN pain  loperamide 2 milliGRAM(s) Oral four times a day PRN Diarrhea  QUEtiapine 25 milliGRAM(s) Oral every 6 hours PRN agitation  simethicone 80 milliGRAM(s) Chew four times a day PRN Dyspepsia      Vital Signs Last 24 Hrs  T(C): 36.6 (21 Aug 2018 08:10), Max: 36.6 (21 Aug 2018 08:10)  T(F): 97.8 (21 Aug 2018 08:10), Max: 97.8 (21 Aug 2018 08:10)  HR: 79 (21 Aug 2018 08:10) (79 - 79)  BP: 130/81 (21 Aug 2018 08:10) (130/81 - 130/81)  BP(mean): --  RR: --  SpO2: --  CAPILLARY BLOOD GLUCOSE      POCT Blood Glucose.: 273 mg/dL (21 Aug 2018 16:01)  POCT Blood Glucose.: 249 mg/dL (21 Aug 2018 11:40)  POCT Blood Glucose.: 218 mg/dL (21 Aug 2018 07:45)  POCT Blood Glucose.: 272 mg/dL (20 Aug 2018 20:04)        PHYSICAL EXAM:  GENERAL: NAD, well-developed  HEAD:  Atraumatic, Normocephalic  EYES: EOMI, conjunctiva and sclera clear  NECK: Supple, No JVD  CHEST/LUNG: Clear to auscultation bilaterally; No wheeze  HEART: Regular rate and rhythm; No murmurs, rubs, or gallops  ABDOMEN: Soft, Nontender, Nondistended; Bowel sounds present  EXTREMITIES:  2+ Peripheral Pulses, No clubbing, cyanosis, or edema  PSYCH: AAOx3  NEUROLOGY: non-focal  SKIN: No rashes or lesions

## 2018-08-22 LAB
GLUCOSE BLDC GLUCOMTR-MCNC: 223 MG/DL — HIGH (ref 70–99)
GLUCOSE BLDC GLUCOMTR-MCNC: 272 MG/DL — HIGH (ref 70–99)
GLUCOSE BLDC GLUCOMTR-MCNC: 287 MG/DL — HIGH (ref 70–99)
GLUCOSE BLDC GLUCOMTR-MCNC: 317 MG/DL — HIGH (ref 70–99)
S PYO SPEC QL CULT: SIGNIFICANT CHANGE UP

## 2018-08-22 PROCEDURE — 99232 SBSQ HOSP IP/OBS MODERATE 35: CPT | Mod: GC

## 2018-08-22 RX ORDER — INSULIN GLARGINE 100 [IU]/ML
25 INJECTION, SOLUTION SUBCUTANEOUS AT BEDTIME
Qty: 0 | Refills: 0 | Status: DISCONTINUED | OUTPATIENT
Start: 2018-08-22 | End: 2018-08-24

## 2018-08-22 RX ORDER — QUETIAPINE FUMARATE 200 MG/1
50 TABLET, FILM COATED ORAL
Qty: 0 | Refills: 0 | Status: DISCONTINUED | OUTPATIENT
Start: 2018-08-23 | End: 2018-08-23

## 2018-08-22 RX ADMIN — CARVEDILOL PHOSPHATE 6.25 MILLIGRAM(S): 80 CAPSULE, EXTENDED RELEASE ORAL at 08:16

## 2018-08-22 RX ADMIN — Medication 2: at 20:42

## 2018-08-22 RX ADMIN — LOSARTAN POTASSIUM 100 MILLIGRAM(S): 100 TABLET, FILM COATED ORAL at 08:16

## 2018-08-22 RX ADMIN — Medication 6: at 16:44

## 2018-08-22 RX ADMIN — Medication 4: at 11:55

## 2018-08-22 RX ADMIN — INSULIN GLARGINE 25 UNIT(S): 100 INJECTION, SOLUTION SUBCUTANEOUS at 20:42

## 2018-08-22 RX ADMIN — QUETIAPINE FUMARATE 50 MILLIGRAM(S): 200 TABLET, FILM COATED ORAL at 08:16

## 2018-08-22 RX ADMIN — AMLODIPINE BESYLATE 10 MILLIGRAM(S): 2.5 TABLET ORAL at 08:16

## 2018-08-22 RX ADMIN — Medication 8: at 08:05

## 2018-08-22 RX ADMIN — QUETIAPINE FUMARATE 400 MILLIGRAM(S): 200 TABLET, FILM COATED ORAL at 20:26

## 2018-08-22 RX ADMIN — CARVEDILOL PHOSPHATE 6.25 MILLIGRAM(S): 80 CAPSULE, EXTENDED RELEASE ORAL at 20:26

## 2018-08-22 RX ADMIN — ATORVASTATIN CALCIUM 20 MILLIGRAM(S): 80 TABLET, FILM COATED ORAL at 20:26

## 2018-08-22 RX ADMIN — BENZOCAINE AND MENTHOL 1 LOZENGE: 5; 1 LIQUID ORAL at 03:34

## 2018-08-22 RX ADMIN — PANTOPRAZOLE SODIUM 40 MILLIGRAM(S): 20 TABLET, DELAYED RELEASE ORAL at 08:16

## 2018-08-22 RX ADMIN — LAMOTRIGINE 100 MILLIGRAM(S): 25 TABLET, ORALLY DISINTEGRATING ORAL at 20:26

## 2018-08-22 RX ADMIN — VALACYCLOVIR 500 MILLIGRAM(S): 500 TABLET, FILM COATED ORAL at 09:08

## 2018-08-23 LAB
GLUCOSE BLDC GLUCOMTR-MCNC: 196 MG/DL — HIGH (ref 70–99)
GLUCOSE BLDC GLUCOMTR-MCNC: 215 MG/DL — HIGH (ref 70–99)
GLUCOSE BLDC GLUCOMTR-MCNC: 236 MG/DL — HIGH (ref 70–99)
GLUCOSE BLDC GLUCOMTR-MCNC: 257 MG/DL — HIGH (ref 70–99)
O+P SPEC CONC: SIGNIFICANT CHANGE UP
SPECIMEN SOURCE: SIGNIFICANT CHANGE UP
TRI STN SPEC: SIGNIFICANT CHANGE UP

## 2018-08-23 PROCEDURE — 99223 1ST HOSP IP/OBS HIGH 75: CPT

## 2018-08-23 PROCEDURE — 90853 GROUP PSYCHOTHERAPY: CPT

## 2018-08-23 PROCEDURE — 99232 SBSQ HOSP IP/OBS MODERATE 35: CPT | Mod: GC,25

## 2018-08-23 RX ORDER — CLOTRIMAZOLE AND BETAMETHASONE DIPROPIONATE 10; .5 MG/G; MG/G
1 CREAM TOPICAL
Qty: 0 | Refills: 0 | Status: DISCONTINUED | OUTPATIENT
Start: 2018-08-23 | End: 2018-08-27

## 2018-08-23 RX ORDER — QUETIAPINE FUMARATE 200 MG/1
50 TABLET, FILM COATED ORAL
Qty: 0 | Refills: 0 | Status: DISCONTINUED | OUTPATIENT
Start: 2018-08-23 | End: 2018-08-24

## 2018-08-23 RX ADMIN — QUETIAPINE FUMARATE 50 MILLIGRAM(S): 200 TABLET, FILM COATED ORAL at 12:38

## 2018-08-23 RX ADMIN — VALACYCLOVIR 500 MILLIGRAM(S): 500 TABLET, FILM COATED ORAL at 08:30

## 2018-08-23 RX ADMIN — INSULIN GLARGINE 25 UNIT(S): 100 INJECTION, SOLUTION SUBCUTANEOUS at 21:27

## 2018-08-23 RX ADMIN — Medication 4: at 08:23

## 2018-08-23 RX ADMIN — QUETIAPINE FUMARATE 400 MILLIGRAM(S): 200 TABLET, FILM COATED ORAL at 21:27

## 2018-08-23 RX ADMIN — Medication 6: at 12:37

## 2018-08-23 RX ADMIN — ATORVASTATIN CALCIUM 20 MILLIGRAM(S): 80 TABLET, FILM COATED ORAL at 21:37

## 2018-08-23 RX ADMIN — CARVEDILOL PHOSPHATE 6.25 MILLIGRAM(S): 80 CAPSULE, EXTENDED RELEASE ORAL at 08:30

## 2018-08-23 RX ADMIN — Medication 2: at 16:37

## 2018-08-23 RX ADMIN — CLOTRIMAZOLE AND BETAMETHASONE DIPROPIONATE 1 APPLICATION(S): 10; .5 CREAM TOPICAL at 22:28

## 2018-08-23 RX ADMIN — LOSARTAN POTASSIUM 100 MILLIGRAM(S): 100 TABLET, FILM COATED ORAL at 08:30

## 2018-08-23 RX ADMIN — SIMETHICONE 80 MILLIGRAM(S): 80 TABLET, CHEWABLE ORAL at 02:45

## 2018-08-23 RX ADMIN — LAMOTRIGINE 100 MILLIGRAM(S): 25 TABLET, ORALLY DISINTEGRATING ORAL at 21:27

## 2018-08-23 RX ADMIN — AMLODIPINE BESYLATE 10 MILLIGRAM(S): 2.5 TABLET ORAL at 08:31

## 2018-08-23 RX ADMIN — PANTOPRAZOLE SODIUM 40 MILLIGRAM(S): 20 TABLET, DELAYED RELEASE ORAL at 08:30

## 2018-08-23 RX ADMIN — CARVEDILOL PHOSPHATE 6.25 MILLIGRAM(S): 80 CAPSULE, EXTENDED RELEASE ORAL at 21:38

## 2018-08-24 LAB
GLUCOSE BLDC GLUCOMTR-MCNC: 191 MG/DL — HIGH (ref 70–99)
GLUCOSE BLDC GLUCOMTR-MCNC: 202 MG/DL — HIGH (ref 70–99)
GLUCOSE BLDC GLUCOMTR-MCNC: 229 MG/DL — HIGH (ref 70–99)
GLUCOSE BLDC GLUCOMTR-MCNC: 258 MG/DL — HIGH (ref 70–99)
SPECIMEN SOURCE: SIGNIFICANT CHANGE UP

## 2018-08-24 PROCEDURE — 99233 SBSQ HOSP IP/OBS HIGH 50: CPT

## 2018-08-24 PROCEDURE — 99232 SBSQ HOSP IP/OBS MODERATE 35: CPT | Mod: GC

## 2018-08-24 RX ORDER — QUETIAPINE FUMARATE 200 MG/1
425 TABLET, FILM COATED ORAL AT BEDTIME
Qty: 0 | Refills: 0 | Status: DISCONTINUED | OUTPATIENT
Start: 2018-08-24 | End: 2018-08-27

## 2018-08-24 RX ORDER — QUETIAPINE FUMARATE 200 MG/1
25 TABLET, FILM COATED ORAL
Qty: 0 | Refills: 0 | Status: DISCONTINUED | OUTPATIENT
Start: 2018-08-24 | End: 2018-08-27

## 2018-08-24 RX ORDER — INSULIN GLARGINE 100 [IU]/ML
30 INJECTION, SOLUTION SUBCUTANEOUS AT BEDTIME
Qty: 0 | Refills: 0 | Status: DISCONTINUED | OUTPATIENT
Start: 2018-08-24 | End: 2018-08-27

## 2018-08-24 RX ADMIN — Medication 6: at 16:40

## 2018-08-24 RX ADMIN — LOSARTAN POTASSIUM 100 MILLIGRAM(S): 100 TABLET, FILM COATED ORAL at 09:06

## 2018-08-24 RX ADMIN — Medication 4: at 11:44

## 2018-08-24 RX ADMIN — LAMOTRIGINE 100 MILLIGRAM(S): 25 TABLET, ORALLY DISINTEGRATING ORAL at 21:01

## 2018-08-24 RX ADMIN — CARVEDILOL PHOSPHATE 6.25 MILLIGRAM(S): 80 CAPSULE, EXTENDED RELEASE ORAL at 09:06

## 2018-08-24 RX ADMIN — Medication 2: at 08:08

## 2018-08-24 RX ADMIN — INSULIN GLARGINE 30 UNIT(S): 100 INJECTION, SOLUTION SUBCUTANEOUS at 21:01

## 2018-08-24 RX ADMIN — PANTOPRAZOLE SODIUM 40 MILLIGRAM(S): 20 TABLET, DELAYED RELEASE ORAL at 09:06

## 2018-08-24 RX ADMIN — CARVEDILOL PHOSPHATE 6.25 MILLIGRAM(S): 80 CAPSULE, EXTENDED RELEASE ORAL at 21:00

## 2018-08-24 RX ADMIN — ATORVASTATIN CALCIUM 20 MILLIGRAM(S): 80 TABLET, FILM COATED ORAL at 21:00

## 2018-08-24 RX ADMIN — CLOTRIMAZOLE AND BETAMETHASONE DIPROPIONATE 1 APPLICATION(S): 10; .5 CREAM TOPICAL at 21:00

## 2018-08-24 RX ADMIN — QUETIAPINE FUMARATE 25 MILLIGRAM(S): 200 TABLET, FILM COATED ORAL at 13:39

## 2018-08-24 RX ADMIN — AMLODIPINE BESYLATE 10 MILLIGRAM(S): 2.5 TABLET ORAL at 09:06

## 2018-08-24 RX ADMIN — CLOTRIMAZOLE AND BETAMETHASONE DIPROPIONATE 1 APPLICATION(S): 10; .5 CREAM TOPICAL at 09:06

## 2018-08-24 RX ADMIN — VALACYCLOVIR 500 MILLIGRAM(S): 500 TABLET, FILM COATED ORAL at 09:06

## 2018-08-24 RX ADMIN — QUETIAPINE FUMARATE 425 MILLIGRAM(S): 200 TABLET, FILM COATED ORAL at 21:01

## 2018-08-24 NOTE — PROGRESS NOTE ADULT - SUBJECTIVE AND OBJECTIVE BOX
CC/Reason for Consult: DM    SUBJECTIVE / OVERNIGHT EVENTS: denies c/o    MEDICATIONS  (STANDING):  amLODIPine   Tablet 10 milliGRAM(s) Oral daily  atorvastatin 20 milliGRAM(s) Oral at bedtime  carvedilol 6.25 milliGRAM(s) Oral two times a day  clotrimazole/betamethasone Cream 1 Application(s) Topical two times a day  dapagliflozin (Farxiga)  5mg/tab 2 Tablet(s) 2 Tablet(s) Oral daily  insulin glargine Injectable (LANTUS) 30 Unit(s) SubCutaneous at bedtime  insulin lispro (HumaLOG) corrective regimen sliding scale   SubCutaneous three times a day before meals  insulin lispro (HumaLOG) corrective regimen sliding scale   SubCutaneous at bedtime  lamoTRIgine 100 milliGRAM(s) Oral at bedtime  losartan 100 milliGRAM(s) Oral daily  pantoprazole    Tablet 40 milliGRAM(s) Oral daily  QUEtiapine 425 milliGRAM(s) Oral at bedtime  QUEtiapine 25 milliGRAM(s) Oral <User Schedule>  sitaGLIPtin 100 milliGRAM(s) Oral daily  valACYclovir 500 milliGRAM(s) Oral daily    MEDICATIONS  (PRN):  acetaminophen   Tablet 650 milliGRAM(s) Oral every 8 hours PRN pain  benzocaine 15 mG/menthol 3.6 mG Lozenge 1 Lozenge Oral four times a day PRN Sore Throat  dextrose 40% Gel 15 Gram(s) Oral once PRN Blood Glucose LESS THAN 70 milliGRAM(s)/deciliter  diphenhydrAMINE   Capsule 50 milliGRAM(s) Oral every 6 hours PRN Extrapyramidal prophylaxis  diphenhydrAMINE   Injectable 50 milliGRAM(s) IV Push Once PRN Extrapyramidal prophylaxis  haloperidol    Injectable 5 milliGRAM(s) IntraMuscular Once PRN severe agitation  hemorrhoidal Ointment 1 Application(s) Rectal four times a day PRN hemorrhoids  ibuprofen  Tablet 400 milliGRAM(s) Oral three times a day PRN pain  loperamide 2 milliGRAM(s) Oral four times a day PRN Diarrhea  QUEtiapine 25 milliGRAM(s) Oral every 6 hours PRN agitation  simethicone 80 milliGRAM(s) Chew four times a day PRN Dyspepsia      Vital Signs Last 24 Hrs  T(C): 36.4 (24 Aug 2018 08:07), Max: 36.4 (24 Aug 2018 08:07)  T(F): 97.6 (24 Aug 2018 08:07), Max: 97.6 (24 Aug 2018 08:07)  HR: 93 (24 Aug 2018 08:07) (93 - 93)  BP: 135/82 (24 Aug 2018 08:07) (135/82 - 135/82)  BP(mean): --  RR: 16  SpO2: --  CAPILLARY BLOOD GLUCOSE      POCT Blood Glucose.: 202 mg/dL (24 Aug 2018 11:12)  POCT Blood Glucose.: 191 mg/dL (24 Aug 2018 07:49)  POCT Blood Glucose.: 236 mg/dL (23 Aug 2018 20:29)  POCT Blood Glucose.: 196 mg/dL (23 Aug 2018 16:02)        PHYSICAL EXAM:  GENERAL: NAD, well-developed  HEAD:  Atraumatic, Normocephalic  EYES: EOMI, conjunctiva and sclera clear  NECK: Supple, No JVD  CHEST/LUNG: Clear to auscultation bilaterally; No wheeze  HEART: Regular rate and rhythm; No murmurs, rubs, or gallops  ABDOMEN: Soft, Nontender, Nondistended; Bowel sounds present  EXTREMITIES:  2+ Peripheral Pulses, No clubbing, cyanosis, or edema  PSYCH: AAOx3  NEUROLOGY: non-focal  SKIN: No rashes or lesions

## 2018-08-24 NOTE — PROGRESS NOTE ADULT - ASSESSMENT
58F DM2 HTN admitted for management of bipolar disorder    Plan:  DM2, uncontrolled: Does not tolerate metformin.  Continue farxiga and januvia.  Increase lantus to 30 units qhs, follow FSBG, will continue to titrate as needed.  Continue statin for ASCVD prevention.    Herpes: Valtrex 500mg daily for suppression of genital herpes.    HTN: Controlled on coreg, losartan, amlodipine.    Diarrhea: Resolved off metformin.    Bipolar disorder: Management per primary team

## 2018-08-25 LAB
GLUCOSE BLDC GLUCOMTR-MCNC: 248 MG/DL — HIGH (ref 70–99)
GLUCOSE BLDC GLUCOMTR-MCNC: 266 MG/DL — HIGH (ref 70–99)
GLUCOSE BLDC GLUCOMTR-MCNC: 283 MG/DL — HIGH (ref 70–99)

## 2018-08-25 RX ADMIN — CLOTRIMAZOLE AND BETAMETHASONE DIPROPIONATE 1 APPLICATION(S): 10; .5 CREAM TOPICAL at 09:38

## 2018-08-25 RX ADMIN — SIMETHICONE 80 MILLIGRAM(S): 80 TABLET, CHEWABLE ORAL at 03:37

## 2018-08-25 RX ADMIN — VALACYCLOVIR 500 MILLIGRAM(S): 500 TABLET, FILM COATED ORAL at 09:41

## 2018-08-25 RX ADMIN — PANTOPRAZOLE SODIUM 40 MILLIGRAM(S): 20 TABLET, DELAYED RELEASE ORAL at 09:41

## 2018-08-25 RX ADMIN — CLOTRIMAZOLE AND BETAMETHASONE DIPROPIONATE 1 APPLICATION(S): 10; .5 CREAM TOPICAL at 21:15

## 2018-08-25 RX ADMIN — LAMOTRIGINE 100 MILLIGRAM(S): 25 TABLET, ORALLY DISINTEGRATING ORAL at 21:14

## 2018-08-25 RX ADMIN — INSULIN GLARGINE 30 UNIT(S): 100 INJECTION, SOLUTION SUBCUTANEOUS at 21:15

## 2018-08-25 RX ADMIN — Medication 6: at 12:02

## 2018-08-25 RX ADMIN — CARVEDILOL PHOSPHATE 6.25 MILLIGRAM(S): 80 CAPSULE, EXTENDED RELEASE ORAL at 09:38

## 2018-08-25 RX ADMIN — LOSARTAN POTASSIUM 100 MILLIGRAM(S): 100 TABLET, FILM COATED ORAL at 09:41

## 2018-08-25 RX ADMIN — PHENYLEPHRINE-SHARK LIVER OIL-MINERAL OIL-PETROLATUM RECTAL OINTMENT 1 APPLICATION(S): at 21:14

## 2018-08-25 RX ADMIN — AMLODIPINE BESYLATE 10 MILLIGRAM(S): 2.5 TABLET ORAL at 09:38

## 2018-08-25 RX ADMIN — CARVEDILOL PHOSPHATE 6.25 MILLIGRAM(S): 80 CAPSULE, EXTENDED RELEASE ORAL at 21:15

## 2018-08-25 RX ADMIN — ATORVASTATIN CALCIUM 20 MILLIGRAM(S): 80 TABLET, FILM COATED ORAL at 21:16

## 2018-08-25 RX ADMIN — Medication 4: at 09:21

## 2018-08-25 RX ADMIN — QUETIAPINE FUMARATE 425 MILLIGRAM(S): 200 TABLET, FILM COATED ORAL at 21:14

## 2018-08-25 RX ADMIN — QUETIAPINE FUMARATE 25 MILLIGRAM(S): 200 TABLET, FILM COATED ORAL at 12:50

## 2018-08-25 RX ADMIN — Medication 6: at 16:32

## 2018-08-26 LAB
BACTERIA GENITAL AEROBE CULT: SIGNIFICANT CHANGE UP
GLUCOSE BLDC GLUCOMTR-MCNC: 182 MG/DL — HIGH (ref 70–99)
GLUCOSE BLDC GLUCOMTR-MCNC: 196 MG/DL — HIGH (ref 70–99)
GLUCOSE BLDC GLUCOMTR-MCNC: 270 MG/DL — HIGH (ref 70–99)
GLUCOSE BLDC GLUCOMTR-MCNC: 315 MG/DL — HIGH (ref 70–99)

## 2018-08-26 RX ADMIN — LOSARTAN POTASSIUM 100 MILLIGRAM(S): 100 TABLET, FILM COATED ORAL at 10:33

## 2018-08-26 RX ADMIN — SIMETHICONE 80 MILLIGRAM(S): 80 TABLET, CHEWABLE ORAL at 21:35

## 2018-08-26 RX ADMIN — PANTOPRAZOLE SODIUM 40 MILLIGRAM(S): 20 TABLET, DELAYED RELEASE ORAL at 10:33

## 2018-08-26 RX ADMIN — INSULIN GLARGINE 30 UNIT(S): 100 INJECTION, SOLUTION SUBCUTANEOUS at 20:44

## 2018-08-26 RX ADMIN — CARVEDILOL PHOSPHATE 6.25 MILLIGRAM(S): 80 CAPSULE, EXTENDED RELEASE ORAL at 20:25

## 2018-08-26 RX ADMIN — VALACYCLOVIR 500 MILLIGRAM(S): 500 TABLET, FILM COATED ORAL at 10:33

## 2018-08-26 RX ADMIN — QUETIAPINE FUMARATE 25 MILLIGRAM(S): 200 TABLET, FILM COATED ORAL at 17:06

## 2018-08-26 RX ADMIN — Medication 2: at 17:00

## 2018-08-26 RX ADMIN — QUETIAPINE FUMARATE 25 MILLIGRAM(S): 200 TABLET, FILM COATED ORAL at 12:26

## 2018-08-26 RX ADMIN — LAMOTRIGINE 100 MILLIGRAM(S): 25 TABLET, ORALLY DISINTEGRATING ORAL at 20:25

## 2018-08-26 RX ADMIN — Medication 4: at 20:45

## 2018-08-26 RX ADMIN — AMLODIPINE BESYLATE 10 MILLIGRAM(S): 2.5 TABLET ORAL at 10:31

## 2018-08-26 RX ADMIN — ATORVASTATIN CALCIUM 20 MILLIGRAM(S): 80 TABLET, FILM COATED ORAL at 20:25

## 2018-08-26 RX ADMIN — Medication 2: at 09:00

## 2018-08-26 RX ADMIN — Medication 6: at 12:26

## 2018-08-26 RX ADMIN — CARVEDILOL PHOSPHATE 6.25 MILLIGRAM(S): 80 CAPSULE, EXTENDED RELEASE ORAL at 10:32

## 2018-08-26 RX ADMIN — QUETIAPINE FUMARATE 425 MILLIGRAM(S): 200 TABLET, FILM COATED ORAL at 20:25

## 2018-08-27 VITALS — TEMPERATURE: 98 F

## 2018-08-27 LAB
GLUCOSE BLDC GLUCOMTR-MCNC: 206 MG/DL — HIGH (ref 70–99)
GLUCOSE BLDC GLUCOMTR-MCNC: 269 MG/DL — HIGH (ref 70–99)

## 2018-08-27 PROCEDURE — 99239 HOSP IP/OBS DSCHRG MGMT >30: CPT | Mod: GC

## 2018-08-27 PROCEDURE — 99233 SBSQ HOSP IP/OBS HIGH 50: CPT

## 2018-08-27 RX ORDER — ENOXAPARIN SODIUM 100 MG/ML
1 INJECTION SUBCUTANEOUS
Qty: 14 | Refills: 0 | OUTPATIENT
Start: 2018-08-27 | End: 2018-09-09

## 2018-08-27 RX ORDER — SITAGLIPTIN 50 MG/1
1 TABLET, FILM COATED ORAL
Qty: 0 | Refills: 0 | COMMUNITY

## 2018-08-27 RX ORDER — QUETIAPINE FUMARATE 200 MG/1
1 TABLET, FILM COATED ORAL
Qty: 14 | Refills: 0 | OUTPATIENT
Start: 2018-08-27 | End: 2018-09-09

## 2018-08-27 RX ORDER — SITAGLIPTIN 50 MG/1
1 TABLET, FILM COATED ORAL
Qty: 0 | Refills: 0 | COMMUNITY
Start: 2018-08-27

## 2018-08-27 RX ORDER — ACYCLOVIR SODIUM 500 MG
1 VIAL (EA) INTRAVENOUS
Qty: 0 | Refills: 0 | COMMUNITY

## 2018-08-27 RX ORDER — LAMOTRIGINE 25 MG/1
1 TABLET, ORALLY DISINTEGRATING ORAL
Qty: 14 | Refills: 0 | OUTPATIENT
Start: 2018-08-27 | End: 2018-09-09

## 2018-08-27 RX ORDER — LOSARTAN POTASSIUM 100 MG/1
1 TABLET, FILM COATED ORAL
Qty: 0 | Refills: 0 | COMMUNITY
Start: 2018-08-27

## 2018-08-27 RX ORDER — PANTOPRAZOLE SODIUM 20 MG/1
1 TABLET, DELAYED RELEASE ORAL
Qty: 0 | Refills: 0 | COMMUNITY
Start: 2018-08-27

## 2018-08-27 RX ORDER — ISOPROPYL ALCOHOL, BENZOCAINE .7; .06 ML/ML; ML/ML
1 SWAB TOPICAL
Qty: 14 | Refills: 0 | OUTPATIENT
Start: 2018-08-27 | End: 2018-09-09

## 2018-08-27 RX ORDER — QUETIAPINE FUMARATE 200 MG/1
1 TABLET, FILM COATED ORAL
Qty: 0 | Refills: 0 | COMMUNITY

## 2018-08-27 RX ORDER — QUETIAPINE FUMARATE 200 MG/1
1 TABLET, FILM COATED ORAL
Qty: 28 | Refills: 0 | OUTPATIENT
Start: 2018-08-27 | End: 2018-09-09

## 2018-08-27 RX ORDER — CARVEDILOL PHOSPHATE 80 MG/1
1 CAPSULE, EXTENDED RELEASE ORAL
Qty: 0 | Refills: 0 | COMMUNITY

## 2018-08-27 RX ORDER — CARVEDILOL PHOSPHATE 80 MG/1
1 CAPSULE, EXTENDED RELEASE ORAL
Qty: 0 | Refills: 0 | COMMUNITY
Start: 2018-08-27

## 2018-08-27 RX ORDER — INSULIN GLARGINE 100 [IU]/ML
36 INJECTION, SOLUTION SUBCUTANEOUS AT BEDTIME
Qty: 0 | Refills: 0 | Status: DISCONTINUED | OUTPATIENT
Start: 2018-08-27 | End: 2018-08-27

## 2018-08-27 RX ORDER — AMLODIPINE BESYLATE 2.5 MG/1
1 TABLET ORAL
Qty: 0 | Refills: 0 | COMMUNITY
Start: 2018-08-27

## 2018-08-27 RX ADMIN — VALACYCLOVIR 500 MILLIGRAM(S): 500 TABLET, FILM COATED ORAL at 08:50

## 2018-08-27 RX ADMIN — AMLODIPINE BESYLATE 10 MILLIGRAM(S): 2.5 TABLET ORAL at 08:50

## 2018-08-27 RX ADMIN — LOSARTAN POTASSIUM 100 MILLIGRAM(S): 100 TABLET, FILM COATED ORAL at 08:50

## 2018-08-27 RX ADMIN — CLOTRIMAZOLE AND BETAMETHASONE DIPROPIONATE 1 APPLICATION(S): 10; .5 CREAM TOPICAL at 08:51

## 2018-08-27 RX ADMIN — SIMETHICONE 80 MILLIGRAM(S): 80 TABLET, CHEWABLE ORAL at 02:16

## 2018-08-27 RX ADMIN — Medication 6: at 12:06

## 2018-08-27 RX ADMIN — QUETIAPINE FUMARATE 25 MILLIGRAM(S): 200 TABLET, FILM COATED ORAL at 12:56

## 2018-08-27 RX ADMIN — PANTOPRAZOLE SODIUM 40 MILLIGRAM(S): 20 TABLET, DELAYED RELEASE ORAL at 08:50

## 2018-08-27 RX ADMIN — Medication 4: at 08:05

## 2018-08-27 RX ADMIN — CARVEDILOL PHOSPHATE 6.25 MILLIGRAM(S): 80 CAPSULE, EXTENDED RELEASE ORAL at 08:50

## 2018-08-27 NOTE — PROGRESS NOTE ADULT - SUBJECTIVE AND OBJECTIVE BOX
CC/Reason for Consult: dm    SUBJECTIVE / OVERNIGHT EVENTS: Feels well, is being discharged    MEDICATIONS  (STANDING):  amLODIPine   Tablet 10 milliGRAM(s) Oral daily  atorvastatin 20 milliGRAM(s) Oral at bedtime  carvedilol 6.25 milliGRAM(s) Oral two times a day  clotrimazole/betamethasone Cream 1 Application(s) Topical two times a day  dapagliflozin (Farxiga)  5mg/tab 2 Tablet(s) 2 Tablet(s) Oral daily  insulin glargine Injectable (LANTUS) 36 Unit(s) SubCutaneous at bedtime  insulin lispro (HumaLOG) corrective regimen sliding scale   SubCutaneous three times a day before meals  insulin lispro (HumaLOG) corrective regimen sliding scale   SubCutaneous at bedtime  lamoTRIgine 100 milliGRAM(s) Oral at bedtime  losartan 100 milliGRAM(s) Oral daily  pantoprazole    Tablet 40 milliGRAM(s) Oral daily  QUEtiapine 425 milliGRAM(s) Oral at bedtime  QUEtiapine 25 milliGRAM(s) Oral <User Schedule>  sitaGLIPtin 100 milliGRAM(s) Oral daily  valACYclovir 500 milliGRAM(s) Oral daily    MEDICATIONS  (PRN):  acetaminophen   Tablet 650 milliGRAM(s) Oral every 8 hours PRN pain  benzocaine 15 mG/menthol 3.6 mG Lozenge 1 Lozenge Oral four times a day PRN Sore Throat  dextrose 40% Gel 15 Gram(s) Oral once PRN Blood Glucose LESS THAN 70 milliGRAM(s)/deciliter  diphenhydrAMINE   Capsule 50 milliGRAM(s) Oral every 6 hours PRN Extrapyramidal prophylaxis  diphenhydrAMINE   Injectable 50 milliGRAM(s) IV Push Once PRN Extrapyramidal prophylaxis  haloperidol    Injectable 5 milliGRAM(s) IntraMuscular Once PRN severe agitation  hemorrhoidal Ointment 1 Application(s) Rectal four times a day PRN hemorrhoids  ibuprofen  Tablet 400 milliGRAM(s) Oral three times a day PRN pain  loperamide 2 milliGRAM(s) Oral four times a day PRN Diarrhea  QUEtiapine 25 milliGRAM(s) Oral every 6 hours PRN agitation  simethicone 80 milliGRAM(s) Chew four times a day PRN Dyspepsia      Vital Signs Last 24 Hrs  T(C): 36.4 (27 Aug 2018 05:48), Max: 36.4 (27 Aug 2018 05:48)  T(F): 97.5 (27 Aug 2018 05:48), Max: 97.5 (27 Aug 2018 05:48)  HR: 88  BP: 139/89  BP(mean): --  RR: 16  SpO2: --  CAPILLARY BLOOD GLUCOSE      POCT Blood Glucose.: 269 mg/dL (27 Aug 2018 11:34)  POCT Blood Glucose.: 206 mg/dL (27 Aug 2018 07:52)  POCT Blood Glucose.: 315 mg/dL (26 Aug 2018 20:03)  POCT Blood Glucose.: 196 mg/dL (26 Aug 2018 16:10)        PHYSICAL EXAM:  GENERAL: NAD, well-developed  HEAD:  Atraumatic, Normocephalic  EYES: EOMI, conjunctiva and sclera clear  NECK: Supple, No JVD  CHEST/LUNG: Clear to auscultation bilaterally; No wheeze  HEART: Regular rate and rhythm; No murmurs, rubs, or gallops  ABDOMEN: Soft, Nontender, Nondistended; Bowel sounds present  EXTREMITIES:  2+ Peripheral Pulses, No clubbing, cyanosis, or edema  PSYCH: AAOx3  NEUROLOGY: non-focal  SKIN: No rashes or lesions

## 2018-08-27 NOTE — PROGRESS NOTE ADULT - ASSESSMENT
58F DM2 HTN admitted for management of bipolar disorder    Plan:  DM2, uncontrolled: Does not tolerate metformin.  Continue farxiga and januvia.  Increase lantus to 36 units qhs, follow FSBG, will continue to titrate as needed.  Continue statin for ASCVD prevention.    Herpes: Valtrex 500mg daily for suppression of genital herpes.    HTN: Controlled on coreg, losartan, amlodipine.    Diarrhea: Resolved off metformin.    Bipolar disorder: Management per primary team

## 2018-08-28 ENCOUNTER — OUTPATIENT (OUTPATIENT)
Dept: OUTPATIENT SERVICES | Facility: HOSPITAL | Age: 58
LOS: 1 days | Discharge: HOME | End: 2018-08-28

## 2018-08-29 DIAGNOSIS — F31.9 BIPOLAR DISORDER, UNSPECIFIED: ICD-10-CM

## 2018-09-04 DIAGNOSIS — Z76.89 PERSONS ENCOUNTERING HEALTH SERVICES IN OTHER SPECIFIED CIRCUMSTANCES: ICD-10-CM

## 2018-09-25 ENCOUNTER — OUTPATIENT (OUTPATIENT)
Dept: OUTPATIENT SERVICES | Facility: HOSPITAL | Age: 58
LOS: 1 days | Discharge: HOME | End: 2018-09-25

## 2018-09-26 DIAGNOSIS — E10.9 TYPE 1 DIABETES MELLITUS WITHOUT COMPLICATIONS: ICD-10-CM

## 2018-09-26 DIAGNOSIS — A60.00 HERPESVIRAL INFECTION OF UROGENITAL SYSTEM, UNSPECIFIED: ICD-10-CM

## 2018-09-26 DIAGNOSIS — G47.33 OBSTRUCTIVE SLEEP APNEA (ADULT) (PEDIATRIC): ICD-10-CM

## 2018-09-26 DIAGNOSIS — F31.9 BIPOLAR DISORDER, UNSPECIFIED: ICD-10-CM

## 2018-09-26 DIAGNOSIS — I10 ESSENTIAL (PRIMARY) HYPERTENSION: ICD-10-CM

## 2019-04-11 ENCOUNTER — INPATIENT (INPATIENT)
Facility: HOSPITAL | Age: 59
LOS: 19 days | Discharge: HOME | End: 2019-05-01
Attending: PSYCHIATRY & NEUROLOGY | Admitting: PSYCHIATRY & NEUROLOGY
Payer: MEDICAID

## 2019-04-11 VITALS
HEART RATE: 77 BPM | RESPIRATION RATE: 16 BRPM | OXYGEN SATURATION: 100 % | TEMPERATURE: 99 F | DIASTOLIC BLOOD PRESSURE: 90 MMHG | SYSTOLIC BLOOD PRESSURE: 148 MMHG

## 2019-04-11 DIAGNOSIS — F31.13 BIPOLAR DISORDER, CURRENT EPISODE MANIC WITHOUT PSYCHOTIC FEATURES, SEVERE: ICD-10-CM

## 2019-04-11 DIAGNOSIS — F31.9 BIPOLAR DISORDER, UNSPECIFIED: ICD-10-CM

## 2019-04-11 DIAGNOSIS — R69 ILLNESS, UNSPECIFIED: ICD-10-CM

## 2019-04-11 LAB
ALBUMIN SERPL ELPH-MCNC: 4 G/DL — SIGNIFICANT CHANGE UP (ref 3.3–5)
ALP SERPL-CCNC: 97 U/L — SIGNIFICANT CHANGE UP (ref 40–120)
ALT FLD-CCNC: 21 U/L — SIGNIFICANT CHANGE UP (ref 4–33)
AMPHET UR-MCNC: NEGATIVE — SIGNIFICANT CHANGE UP
ANION GAP SERPL CALC-SCNC: 13 MMO/L — SIGNIFICANT CHANGE UP (ref 7–14)
APAP SERPL-MCNC: < 15 UG/ML — LOW (ref 15–25)
APPEARANCE UR: CLEAR — SIGNIFICANT CHANGE UP
AST SERPL-CCNC: 21 U/L — SIGNIFICANT CHANGE UP (ref 4–32)
BACTERIA # UR AUTO: NEGATIVE — SIGNIFICANT CHANGE UP
BARBITURATES UR SCN-MCNC: NEGATIVE — SIGNIFICANT CHANGE UP
BASOPHILS # BLD AUTO: 0.06 K/UL — SIGNIFICANT CHANGE UP (ref 0–0.2)
BASOPHILS NFR BLD AUTO: 0.6 % — SIGNIFICANT CHANGE UP (ref 0–2)
BENZODIAZ UR-MCNC: NEGATIVE — SIGNIFICANT CHANGE UP
BILIRUB SERPL-MCNC: 0.7 MG/DL — SIGNIFICANT CHANGE UP (ref 0.2–1.2)
BILIRUB UR-MCNC: NEGATIVE — SIGNIFICANT CHANGE UP
BLOOD UR QL VISUAL: NEGATIVE — SIGNIFICANT CHANGE UP
BUN SERPL-MCNC: 12 MG/DL — SIGNIFICANT CHANGE UP (ref 7–23)
CALCIUM SERPL-MCNC: 9.4 MG/DL — SIGNIFICANT CHANGE UP (ref 8.4–10.5)
CANNABINOIDS UR-MCNC: NEGATIVE — SIGNIFICANT CHANGE UP
CHLORIDE SERPL-SCNC: 100 MMOL/L — SIGNIFICANT CHANGE UP (ref 98–107)
CO2 SERPL-SCNC: 29 MMOL/L — SIGNIFICANT CHANGE UP (ref 22–31)
COCAINE METAB.OTHER UR-MCNC: NEGATIVE — SIGNIFICANT CHANGE UP
COLOR SPEC: SIGNIFICANT CHANGE UP
CREAT SERPL-MCNC: 0.52 MG/DL — SIGNIFICANT CHANGE UP (ref 0.5–1.3)
EOSINOPHIL # BLD AUTO: 0.13 K/UL — SIGNIFICANT CHANGE UP (ref 0–0.5)
EOSINOPHIL NFR BLD AUTO: 1.4 % — SIGNIFICANT CHANGE UP (ref 0–6)
ETHANOL BLD-MCNC: < 10 MG/DL — SIGNIFICANT CHANGE UP
GLUCOSE SERPL-MCNC: 183 MG/DL — HIGH (ref 70–99)
GLUCOSE UR-MCNC: NEGATIVE — SIGNIFICANT CHANGE UP
HCG SERPL-ACNC: < 5 MIU/ML — SIGNIFICANT CHANGE UP
HCT VFR BLD CALC: 38.3 % — SIGNIFICANT CHANGE UP (ref 34.5–45)
HGB BLD-MCNC: 12.3 G/DL — SIGNIFICANT CHANGE UP (ref 11.5–15.5)
HYALINE CASTS # UR AUTO: NEGATIVE — SIGNIFICANT CHANGE UP
IMM GRANULOCYTES NFR BLD AUTO: 0.2 % — SIGNIFICANT CHANGE UP (ref 0–1.5)
KETONES UR-MCNC: NEGATIVE — SIGNIFICANT CHANGE UP
LEUKOCYTE ESTERASE UR-ACNC: NEGATIVE — SIGNIFICANT CHANGE UP
LYMPHOCYTES # BLD AUTO: 3.58 K/UL — HIGH (ref 1–3.3)
LYMPHOCYTES # BLD AUTO: 38.7 % — SIGNIFICANT CHANGE UP (ref 13–44)
MCHC RBC-ENTMCNC: 29.4 PG — SIGNIFICANT CHANGE UP (ref 27–34)
MCHC RBC-ENTMCNC: 32.1 % — SIGNIFICANT CHANGE UP (ref 32–36)
MCV RBC AUTO: 91.6 FL — SIGNIFICANT CHANGE UP (ref 80–100)
METHADONE UR-MCNC: NEGATIVE — SIGNIFICANT CHANGE UP
MONOCYTES # BLD AUTO: 0.64 K/UL — SIGNIFICANT CHANGE UP (ref 0–0.9)
MONOCYTES NFR BLD AUTO: 6.9 % — SIGNIFICANT CHANGE UP (ref 2–14)
NEUTROPHILS # BLD AUTO: 4.83 K/UL — SIGNIFICANT CHANGE UP (ref 1.8–7.4)
NEUTROPHILS NFR BLD AUTO: 52.2 % — SIGNIFICANT CHANGE UP (ref 43–77)
NITRITE UR-MCNC: NEGATIVE — SIGNIFICANT CHANGE UP
NRBC # FLD: 0 K/UL — SIGNIFICANT CHANGE UP (ref 0–0)
OPIATES UR-MCNC: NEGATIVE — SIGNIFICANT CHANGE UP
OXYCODONE UR-MCNC: NEGATIVE — SIGNIFICANT CHANGE UP
PCP UR-MCNC: NEGATIVE — SIGNIFICANT CHANGE UP
PH UR: 6.5 — SIGNIFICANT CHANGE UP (ref 5–8)
PLATELET # BLD AUTO: 338 K/UL — SIGNIFICANT CHANGE UP (ref 150–400)
PMV BLD: 10.4 FL — SIGNIFICANT CHANGE UP (ref 7–13)
POTASSIUM SERPL-MCNC: 3.6 MMOL/L — SIGNIFICANT CHANGE UP (ref 3.5–5.3)
POTASSIUM SERPL-SCNC: 3.6 MMOL/L — SIGNIFICANT CHANGE UP (ref 3.5–5.3)
PROT SERPL-MCNC: 6.2 G/DL — SIGNIFICANT CHANGE UP (ref 6–8.3)
PROT UR-MCNC: 20 — SIGNIFICANT CHANGE UP
RBC # BLD: 4.18 M/UL — SIGNIFICANT CHANGE UP (ref 3.8–5.2)
RBC # FLD: 12 % — SIGNIFICANT CHANGE UP (ref 10.3–14.5)
RBC CASTS # UR COMP ASSIST: SIGNIFICANT CHANGE UP (ref 0–?)
SALICYLATES SERPL-MCNC: < 5 MG/DL — LOW (ref 15–30)
SODIUM SERPL-SCNC: 142 MMOL/L — SIGNIFICANT CHANGE UP (ref 135–145)
SP GR SPEC: 1.01 — SIGNIFICANT CHANGE UP (ref 1–1.04)
SQUAMOUS # UR AUTO: SIGNIFICANT CHANGE UP
TSH SERPL-MCNC: 1.6 UIU/ML — SIGNIFICANT CHANGE UP (ref 0.27–4.2)
UROBILINOGEN FLD QL: NORMAL — SIGNIFICANT CHANGE UP
WBC # BLD: 9.26 K/UL — SIGNIFICANT CHANGE UP (ref 3.8–10.5)
WBC # FLD AUTO: 9.26 K/UL — SIGNIFICANT CHANGE UP (ref 3.8–10.5)
WBC UR QL: SIGNIFICANT CHANGE UP (ref 0–?)

## 2019-04-11 PROCEDURE — 99285 EMERGENCY DEPT VISIT HI MDM: CPT

## 2019-04-11 RX ORDER — INSULIN LISPRO 100/ML
VIAL (ML) SUBCUTANEOUS
Qty: 0 | Refills: 0 | Status: DISCONTINUED | OUTPATIENT
Start: 2019-04-11 | End: 2019-04-29

## 2019-04-11 RX ORDER — LOSARTAN POTASSIUM 100 MG/1
100 TABLET, FILM COATED ORAL DAILY
Qty: 0 | Refills: 0 | Status: DISCONTINUED | OUTPATIENT
Start: 2019-04-11 | End: 2019-05-01

## 2019-04-11 RX ORDER — DIPHENHYDRAMINE HCL 50 MG
50 CAPSULE ORAL ONCE
Qty: 0 | Refills: 0 | Status: DISCONTINUED | OUTPATIENT
Start: 2019-04-11 | End: 2019-05-01

## 2019-04-11 RX ORDER — INSULIN LISPRO 100/ML
VIAL (ML) SUBCUTANEOUS AT BEDTIME
Qty: 0 | Refills: 0 | Status: DISCONTINUED | OUTPATIENT
Start: 2019-04-11 | End: 2019-04-29

## 2019-04-11 RX ORDER — ATORVASTATIN CALCIUM 80 MG/1
20 TABLET, FILM COATED ORAL AT BEDTIME
Qty: 0 | Refills: 0 | Status: DISCONTINUED | OUTPATIENT
Start: 2019-04-11 | End: 2019-05-01

## 2019-04-11 RX ORDER — CLONAZEPAM 1 MG
0.5 TABLET ORAL AT BEDTIME
Qty: 0 | Refills: 0 | Status: DISCONTINUED | OUTPATIENT
Start: 2019-04-11 | End: 2019-04-17

## 2019-04-11 RX ORDER — CARVEDILOL PHOSPHATE 80 MG/1
6.25 CAPSULE, EXTENDED RELEASE ORAL EVERY 12 HOURS
Qty: 0 | Refills: 0 | Status: DISCONTINUED | OUTPATIENT
Start: 2019-04-11 | End: 2019-05-01

## 2019-04-11 RX ORDER — DEXTROSE 50 % IN WATER 50 %
15 SYRINGE (ML) INTRAVENOUS ONCE
Qty: 0 | Refills: 0 | Status: DISCONTINUED | OUTPATIENT
Start: 2019-04-11 | End: 2019-05-01

## 2019-04-11 RX ORDER — HALOPERIDOL DECANOATE 100 MG/ML
5 INJECTION INTRAMUSCULAR ONCE
Qty: 0 | Refills: 0 | Status: COMPLETED | OUTPATIENT
Start: 2019-04-11 | End: 2019-04-11

## 2019-04-11 RX ORDER — DIPHENHYDRAMINE HCL 50 MG
50 CAPSULE ORAL EVERY 6 HOURS
Qty: 0 | Refills: 0 | Status: DISCONTINUED | OUTPATIENT
Start: 2019-04-11 | End: 2019-05-01

## 2019-04-11 RX ORDER — HALOPERIDOL DECANOATE 100 MG/ML
5 INJECTION INTRAMUSCULAR ONCE
Qty: 0 | Refills: 0 | Status: DISCONTINUED | OUTPATIENT
Start: 2019-04-11 | End: 2019-05-01

## 2019-04-11 RX ORDER — SODIUM CHLORIDE 9 MG/ML
1000 INJECTION, SOLUTION INTRAVENOUS
Qty: 0 | Refills: 0 | Status: DISCONTINUED | OUTPATIENT
Start: 2019-04-11 | End: 2019-04-29

## 2019-04-11 RX ORDER — AMLODIPINE BESYLATE 2.5 MG/1
10 TABLET ORAL DAILY
Qty: 0 | Refills: 0 | Status: DISCONTINUED | OUTPATIENT
Start: 2019-04-11 | End: 2019-05-01

## 2019-04-11 RX ORDER — PANTOPRAZOLE SODIUM 20 MG/1
40 TABLET, DELAYED RELEASE ORAL
Qty: 0 | Refills: 0 | Status: DISCONTINUED | OUTPATIENT
Start: 2019-04-11 | End: 2019-05-01

## 2019-04-11 RX ORDER — LAMOTRIGINE 25 MG/1
25 TABLET, ORALLY DISINTEGRATING ORAL DAILY
Qty: 0 | Refills: 0 | Status: DISCONTINUED | OUTPATIENT
Start: 2019-04-11 | End: 2019-04-16

## 2019-04-11 RX ORDER — QUETIAPINE FUMARATE 200 MG/1
50 TABLET, FILM COATED ORAL AT BEDTIME
Qty: 0 | Refills: 0 | Status: DISCONTINUED | OUTPATIENT
Start: 2019-04-11 | End: 2019-04-13

## 2019-04-11 RX ORDER — HALOPERIDOL DECANOATE 100 MG/ML
5 INJECTION INTRAMUSCULAR EVERY 6 HOURS
Qty: 0 | Refills: 0 | Status: DISCONTINUED | OUTPATIENT
Start: 2019-04-11 | End: 2019-05-01

## 2019-04-11 RX ORDER — GLIMEPIRIDE 1 MG
4 TABLET ORAL
Qty: 0 | Refills: 0 | Status: DISCONTINUED | OUTPATIENT
Start: 2019-04-11 | End: 2019-05-01

## 2019-04-11 RX ADMIN — Medication 2 MILLIGRAM(S): at 20:19

## 2019-04-11 RX ADMIN — HALOPERIDOL DECANOATE 5 MILLIGRAM(S): 100 INJECTION INTRAMUSCULAR at 20:19
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
## 2019-04-11 NOTE — ED BEHAVIORAL HEALTH ASSESSMENT NOTE - SUICIDE RISK FACTORS
Agitation/severe anxiety/Access to means (pills, firearms, etc.)/Mood episode/Unable to engage in safety planning/Highly impulsive behavior

## 2019-04-11 NOTE — ED BEHAVIORAL HEALTH ASSESSMENT NOTE - RISK ASSESSMENT
Patient has acute risks including worsening affective instability and impulsivity, recent aggression, not able to maintain behavioral control in ED, questionable compliance w treatement, recent poor judgement, along w chronic baseline risks including past psych admission. Pt poses and imminent danger to self as she is not caring for self, and also poses a danger to others, she refuses voluntary admission and will be admitted 9.39 for treatment and stabilization. Transfer to [facility] with EMS and buckle guard, to be released on arrival. No need for 1;1 CO in locked supervised unit as denies suicidal or homicidal intent.
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
## 2019-04-11 NOTE — ED BEHAVIORAL HEALTH ASSESSMENT NOTE - DESCRIPTION
irritable, yelling profanities, severely agitated and required IM medication  ICU Vital Signs Last 24 Hrs  T(C): 36.9 (08 Aug 2018 15:05), Max: 36.9 (08 Aug 2018 15:05)  T(F): 98.5 (08 Aug 2018 15:05), Max: 98.5 (08 Aug 2018 15:05)  HR: 86 (08 Aug 2018 15:05) (86 - 97)  BP: 157/96 (08 Aug 2018 15:05) (148/92 - 157/96)  BP(mean): --  ABP: --  ABP(mean): --  RR: 16 (08 Aug 2018 15:05) (16 - 16)  SpO2: 98% (08 Aug 2018 15:05) (98% - 98%) sleep apnea, HLD, DM, OA, HTN, HSV-2 Pt is  F, born [place] , [place] age 22, lives with her daughter, son and grandson irritable, yelling profanities, severely agitated and required IM medication  ICU Vital Signs Last 24 Hrs  T(C): 37.3 (11 Apr 2019 19:16), Max: 37.3 (11 Apr 2019 19:16)  T(F): 99.2 (11 Apr 2019 19:16), Max: 99.2 (11 Apr 2019 19:16)  HR: 77 (11 Apr 2019 19:16) (77 - 77)  BP: 148/90 (11 Apr 2019 19:16) (148/90 - 148/90)  BP(mean): --  ABP: --  ABP(mean): --  RR: 16 (11 Apr 2019 19:16) (16 - 16)  SpO2: 100% (11 Apr 2019 19:16) (100% - 100%)

## 2019-04-11 NOTE — ED BEHAVIORAL HEALTH ASSESSMENT NOTE - MEDICAL ISSUES AND PLAN (INCLUDE STANDING AND PRN MEDICATION)
Sleep apnea-non compliant w mask, use sleep log .DM2:  glimepiride 4mg (unavailable in formulary) c/w metformin 1000mg lipitor 20mg qhs, Januvia 100mg daily, low dose ISS, HTN  valsartan 32 mg qd (will not start as per pharmacy recalled for impurities , start Cozaar instead )coreg 25mg qd , sleep apnea-put on sleep l Sleep apnea-non compliant w mask, use sleep log .DM2: Sliding scale ,glimepiride 4mg lipitor 20mg qhs, Januvia 100mg daily, low dose ISS, HTN  valsartan 32 mg qd (will not start as per pharmacy recalled for impurities , start Cozaar instead )coreg 25mg qd , sleep apnea-put on sleep l

## 2019-04-11 NOTE — ED BEHAVIORAL HEALTH ASSESSMENT NOTE - OTHER PAST PSYCHIATRIC HISTORY (INCLUDE DETAILS REGARDING ONSET, COURSE OF ILLNESS, INPATIENT/OUTPATIENT TREATMENT)
Pt has hx of bipolar disorder with multiple admissions > 20 times, pt denies any hx of suicidal behavior or self injurious behavior  - attended the [facility] outpatient clinic, was discharged as did not follow up with treatment

## 2019-04-11 NOTE — ED PROVIDER NOTE - CLINICAL SUMMARY MEDICAL DECISION MAKING FREE TEXT BOX
Pt c extensive psych h/o brought in for agitation.  Appears manic, aggressive, agitated and non cooperative.  Will c/w assessment in BH area.  No e/o injuries or systemic illness.  Hx limited as pt not cooperative, provided by EMS.
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
## 2019-04-11 NOTE — ED ADULT NURSE REASSESSMENT NOTE - NS ED NURSE REASSESS COMMENT FT1
Received pt at 2030, pt awake s/p IM meds received for agitation. Pt remained verbally aggressive and threatening towards staff. Pt refusing to answer questions, stating "Leave me alone and just send me upstairs, my life is a mess". Unable to assess for current s/i or plan to harm self due to irritability. Labs/ekg completed, results pending for [facility]. @ 2115, pt sedated s/p IM meds received, NAD, even unlabored resp observed. Will continue to monitor for safety.

## 2019-04-11 NOTE — ED PROVIDER NOTE - OBJECTIVE STATEMENT
59 yr old F c bipolar BIBEMS and PD d/t agitation at home.  Upon arrival pt is verbally abusive in Yoruba and english.  Answering questions in English, intermittently.  Very aggressive but no physical violence noted initially.  Does not answer ROS questions.  Limited cooperation c exam.  Has h/o previous psychiatric admits for same at [facility].

## 2019-04-11 NOTE — ED BEHAVIORAL HEALTH ASSESSMENT NOTE - CURRENT MEDICATION
Lamictal 100 mg qhs and Seroquel 400 mg qhs  glimepiride 4mg qbreakfast, metformin 1000mg lipitor 20mg qhs, Januvia 100mg daily, valsartan 320 mg qd coreg 25mg qd

## 2019-04-11 NOTE — ED BEHAVIORAL HEALTH ASSESSMENT NOTE - SUMMARY
Pt is a 58 yo Swedish F, , recently unemployed, domiciled with adult son, daughter, and 7 y/o grandson, hx of bipolar 1 disorder, reported hx of over 20 lifetime inpatient psychiatric hospitalizations 9most recent in [address] 4/4/17-4/26/17 with suicidality; , no known hx of violence/substance abuse, 1 remote self-aborted suicide attempt, significant fam hx of suicide, and complicated  PMhx of sleep apnea, HLD, DM, OA, HTN, HSV-2, pt presents to the ED for the second time in 2 days, BIB EMS activated by son as patient was making suicidal statements today and has been becoming more agitated and not sleeping over past 2 weeks. Today in the ED, patient is belligerent, yelling profanities. The patient is insistent that she wants to go home, she is minimally cooperative with interview, speech is normal in rate, vol and tone, thought processes are disorganized, mood is irritable, affect is labile, she reports paranoid ideation toward family and neighbors, insight is poor, denies SI. Collateral hx is concerning for worsening  affective instability, impulsivity and agitation and aggressive behavior. Dx in keeping with relapse with mixed affective state-bipolar 1 disorder. Pt is a 57 yo Niue[place] F, ,, recently unemployed, domiciled with adult son, daughter, and 7 y/o grandson, hx of bipolar 1 disorder, reported hx of over 20 lifetime inpatient psychiatric hospitalizations (most recent in [address] 8/8/18-8/27/18 with manic episode , currently non compliant w treatment; no known hx of violence/substance abuse, 1 remote self-aborted suicide attempt, significant fam hx of suicide, and complicated  PMhx of CVA (3 months ago);sleep apnea, HLD, DM, OA, HTN, HSV-2, BIB EMS activated by family with a 2 week history of increasing irritability, today became acutely agitated and was making homicidal threats to her family. In the ED, the patient is highly agitated, belligerent, cursing at staff, she required haldol 5mgIM and ativan 2mg IM. She is minimally cooperative with interview, speech is loud, pressured,, thought processes are disorganized, mood is irritable, affect is labile, she insight is poor, denies SI. Collateral hx is concerning for worsening  affective instability, impulsivity and agitation and aggressive behavior. Dx in keeping with relapse with mixed affective state-bipolar 1 disorder in context of medication non compliance..

## 2019-04-11 NOTE — ED ADULT TRIAGE NOTE - CHIEF COMPLAINT QUOTE
as per family patient agitated at home and aggressive. Currently denies medical complaints. Awaiting med clear. Patient uncooperative in triage. Denies alcohol/drug use. Denies SI/HI. Hx Bipolar

## 2019-04-12 LAB
CHOLEST SERPL-MCNC: 170 MG/DL — SIGNIFICANT CHANGE UP (ref 120–199)
HBA1C BLD-MCNC: 11.9 % — HIGH (ref 4–5.6)
HDLC SERPL-MCNC: 75 MG/DL — HIGH (ref 45–65)
LIPID PNL WITH DIRECT LDL SERPL: 92 MG/DL — SIGNIFICANT CHANGE UP
TRIGL SERPL-MCNC: 89 MG/DL — SIGNIFICANT CHANGE UP (ref 10–149)

## 2019-04-12 PROCEDURE — 99222 1ST HOSP IP/OBS MODERATE 55: CPT

## 2019-04-12 RX ORDER — ONDANSETRON 8 MG/1
4 TABLET, FILM COATED ORAL ONCE
Qty: 0 | Refills: 0 | Status: COMPLETED | OUTPATIENT
Start: 2019-04-12 | End: 2019-04-12

## 2019-04-12 RX ORDER — IBUPROFEN 200 MG
400 TABLET ORAL EVERY 6 HOURS
Qty: 0 | Refills: 0 | Status: DISCONTINUED | OUTPATIENT
Start: 2019-04-12 | End: 2019-05-01

## 2019-04-12 RX ORDER — INSULIN GLARGINE 100 [IU]/ML
30 INJECTION, SOLUTION SUBCUTANEOUS AT BEDTIME
Qty: 0 | Refills: 0 | Status: DISCONTINUED | OUTPATIENT
Start: 2019-04-12 | End: 2019-05-01

## 2019-04-12 RX ORDER — METFORMIN HYDROCHLORIDE 850 MG/1
1000 TABLET ORAL
Qty: 0 | Refills: 0 | Status: DISCONTINUED | OUTPATIENT
Start: 2019-04-12 | End: 2019-05-01

## 2019-04-12 RX ADMIN — Medication 2: at 12:38

## 2019-04-12 RX ADMIN — CARVEDILOL PHOSPHATE 6.25 MILLIGRAM(S): 80 CAPSULE, EXTENDED RELEASE ORAL at 21:50

## 2019-04-12 RX ADMIN — AMLODIPINE BESYLATE 10 MILLIGRAM(S): 2.5 TABLET ORAL at 08:44

## 2019-04-12 RX ADMIN — Medication 1: at 17:10

## 2019-04-12 RX ADMIN — LAMOTRIGINE 25 MILLIGRAM(S): 25 TABLET, ORALLY DISINTEGRATING ORAL at 08:43

## 2019-04-12 RX ADMIN — Medication 0.5 MILLIGRAM(S): at 21:30

## 2019-04-12 RX ADMIN — CARVEDILOL PHOSPHATE 6.25 MILLIGRAM(S): 80 CAPSULE, EXTENDED RELEASE ORAL at 08:47

## 2019-04-12 RX ADMIN — QUETIAPINE FUMARATE 50 MILLIGRAM(S): 200 TABLET, FILM COATED ORAL at 21:30

## 2019-04-12 RX ADMIN — Medication 400 MILLIGRAM(S): at 11:36

## 2019-04-12 RX ADMIN — INSULIN GLARGINE 30 UNIT(S): 100 INJECTION, SOLUTION SUBCUTANEOUS at 21:30

## 2019-04-12 RX ADMIN — ATORVASTATIN CALCIUM 20 MILLIGRAM(S): 80 TABLET, FILM COATED ORAL at 21:49

## 2019-04-12 RX ADMIN — Medication 2: at 08:43

## 2019-04-12 RX ADMIN — Medication 4 MILLIGRAM(S): at 08:44

## 2019-04-12 RX ADMIN — Medication 400 MILLIGRAM(S): at 12:39

## 2019-04-12 RX ADMIN — METFORMIN HYDROCHLORIDE 1000 MILLIGRAM(S): 850 TABLET ORAL at 21:30

## 2019-04-12 RX ADMIN — LOSARTAN POTASSIUM 100 MILLIGRAM(S): 100 TABLET, FILM COATED ORAL at 08:43

## 2019-04-12 RX ADMIN — PANTOPRAZOLE SODIUM 40 MILLIGRAM(S): 20 TABLET, DELAYED RELEASE ORAL at 06:45

## 2019-04-13 RX ORDER — QUETIAPINE FUMARATE 200 MG/1
100 TABLET, FILM COATED ORAL AT BEDTIME
Qty: 0 | Refills: 0 | Status: DISCONTINUED | OUTPATIENT
Start: 2019-04-13 | End: 2019-04-15

## 2019-04-13 RX ADMIN — CARVEDILOL PHOSPHATE 6.25 MILLIGRAM(S): 80 CAPSULE, EXTENDED RELEASE ORAL at 08:54

## 2019-04-13 RX ADMIN — LOSARTAN POTASSIUM 100 MILLIGRAM(S): 100 TABLET, FILM COATED ORAL at 08:54

## 2019-04-13 RX ADMIN — AMLODIPINE BESYLATE 10 MILLIGRAM(S): 2.5 TABLET ORAL at 08:54

## 2019-04-13 RX ADMIN — QUETIAPINE FUMARATE 100 MILLIGRAM(S): 200 TABLET, FILM COATED ORAL at 21:05

## 2019-04-13 RX ADMIN — INSULIN GLARGINE 30 UNIT(S): 100 INJECTION, SOLUTION SUBCUTANEOUS at 21:04

## 2019-04-13 RX ADMIN — Medication 400 MILLIGRAM(S): at 06:30

## 2019-04-13 RX ADMIN — CARVEDILOL PHOSPHATE 6.25 MILLIGRAM(S): 80 CAPSULE, EXTENDED RELEASE ORAL at 21:04

## 2019-04-13 RX ADMIN — Medication 2: at 21:04

## 2019-04-13 RX ADMIN — METFORMIN HYDROCHLORIDE 1000 MILLIGRAM(S): 850 TABLET ORAL at 21:05

## 2019-04-13 RX ADMIN — Medication 3: at 08:36

## 2019-04-13 RX ADMIN — Medication 1: at 16:47

## 2019-04-13 RX ADMIN — Medication 4 MILLIGRAM(S): at 08:54

## 2019-04-13 RX ADMIN — LAMOTRIGINE 25 MILLIGRAM(S): 25 TABLET, ORALLY DISINTEGRATING ORAL at 08:54

## 2019-04-13 RX ADMIN — Medication 1 TABLET(S): at 08:54

## 2019-04-13 RX ADMIN — PANTOPRAZOLE SODIUM 40 MILLIGRAM(S): 20 TABLET, DELAYED RELEASE ORAL at 08:54

## 2019-04-13 RX ADMIN — Medication 0.5 MILLIGRAM(S): at 21:04

## 2019-04-13 RX ADMIN — Medication 2: at 12:16

## 2019-04-13 RX ADMIN — METFORMIN HYDROCHLORIDE 1000 MILLIGRAM(S): 850 TABLET ORAL at 08:54

## 2019-04-13 RX ADMIN — ATORVASTATIN CALCIUM 20 MILLIGRAM(S): 80 TABLET, FILM COATED ORAL at 21:04

## 2019-04-14 PROCEDURE — 99232 SBSQ HOSP IP/OBS MODERATE 35: CPT

## 2019-04-14 RX ADMIN — METFORMIN HYDROCHLORIDE 1000 MILLIGRAM(S): 850 TABLET ORAL at 20:58

## 2019-04-14 RX ADMIN — LOSARTAN POTASSIUM 100 MILLIGRAM(S): 100 TABLET, FILM COATED ORAL at 08:49

## 2019-04-14 RX ADMIN — METFORMIN HYDROCHLORIDE 1000 MILLIGRAM(S): 850 TABLET ORAL at 08:49

## 2019-04-14 RX ADMIN — AMLODIPINE BESYLATE 10 MILLIGRAM(S): 2.5 TABLET ORAL at 08:49

## 2019-04-14 RX ADMIN — Medication 400 MILLIGRAM(S): at 17:26

## 2019-04-14 RX ADMIN — INSULIN GLARGINE 30 UNIT(S): 100 INJECTION, SOLUTION SUBCUTANEOUS at 20:58

## 2019-04-14 RX ADMIN — Medication 4 MILLIGRAM(S): at 08:49

## 2019-04-14 RX ADMIN — Medication 2: at 12:24

## 2019-04-14 RX ADMIN — CARVEDILOL PHOSPHATE 6.25 MILLIGRAM(S): 80 CAPSULE, EXTENDED RELEASE ORAL at 08:49

## 2019-04-14 RX ADMIN — Medication 0.5 MILLIGRAM(S): at 20:58

## 2019-04-14 RX ADMIN — Medication 1: at 17:24

## 2019-04-14 RX ADMIN — QUETIAPINE FUMARATE 100 MILLIGRAM(S): 200 TABLET, FILM COATED ORAL at 20:58

## 2019-04-14 RX ADMIN — PANTOPRAZOLE SODIUM 40 MILLIGRAM(S): 20 TABLET, DELAYED RELEASE ORAL at 06:56

## 2019-04-14 RX ADMIN — LAMOTRIGINE 25 MILLIGRAM(S): 25 TABLET, ORALLY DISINTEGRATING ORAL at 08:49

## 2019-04-14 RX ADMIN — CARVEDILOL PHOSPHATE 6.25 MILLIGRAM(S): 80 CAPSULE, EXTENDED RELEASE ORAL at 20:58

## 2019-04-14 RX ADMIN — Medication 3: at 08:49

## 2019-04-14 RX ADMIN — Medication 400 MILLIGRAM(S): at 18:32

## 2019-04-14 RX ADMIN — ATORVASTATIN CALCIUM 20 MILLIGRAM(S): 80 TABLET, FILM COATED ORAL at 20:58

## 2019-04-14 RX ADMIN — Medication 1 TABLET(S): at 08:50

## 2019-04-15 PROCEDURE — 99232 SBSQ HOSP IP/OBS MODERATE 35: CPT | Mod: 25

## 2019-04-15 PROCEDURE — 90853 GROUP PSYCHOTHERAPY: CPT

## 2019-04-15 RX ORDER — QUETIAPINE FUMARATE 200 MG/1
150 TABLET, FILM COATED ORAL AT BEDTIME
Qty: 0 | Refills: 0 | Status: DISCONTINUED | OUTPATIENT
Start: 2019-04-15 | End: 2019-04-17

## 2019-04-15 RX ADMIN — Medication 400 MILLIGRAM(S): at 01:32

## 2019-04-15 RX ADMIN — METFORMIN HYDROCHLORIDE 1000 MILLIGRAM(S): 850 TABLET ORAL at 08:29

## 2019-04-15 RX ADMIN — METFORMIN HYDROCHLORIDE 1000 MILLIGRAM(S): 850 TABLET ORAL at 21:23

## 2019-04-15 RX ADMIN — Medication 3: at 16:47

## 2019-04-15 RX ADMIN — Medication 1: at 08:29

## 2019-04-15 RX ADMIN — LOSARTAN POTASSIUM 100 MILLIGRAM(S): 100 TABLET, FILM COATED ORAL at 08:29

## 2019-04-15 RX ADMIN — Medication 1: at 11:41

## 2019-04-15 RX ADMIN — AMLODIPINE BESYLATE 10 MILLIGRAM(S): 2.5 TABLET ORAL at 08:28

## 2019-04-15 RX ADMIN — ATORVASTATIN CALCIUM 20 MILLIGRAM(S): 80 TABLET, FILM COATED ORAL at 20:57

## 2019-04-15 RX ADMIN — Medication 1 TABLET(S): at 08:29

## 2019-04-15 RX ADMIN — Medication 2 MILLIGRAM(S): at 13:45

## 2019-04-15 RX ADMIN — Medication 4 MILLIGRAM(S): at 08:28

## 2019-04-15 RX ADMIN — Medication 1: at 20:58

## 2019-04-15 RX ADMIN — LAMOTRIGINE 25 MILLIGRAM(S): 25 TABLET, ORALLY DISINTEGRATING ORAL at 08:29

## 2019-04-15 RX ADMIN — Medication 0.5 MILLIGRAM(S): at 20:57

## 2019-04-15 RX ADMIN — QUETIAPINE FUMARATE 150 MILLIGRAM(S): 200 TABLET, FILM COATED ORAL at 21:23

## 2019-04-15 RX ADMIN — CARVEDILOL PHOSPHATE 6.25 MILLIGRAM(S): 80 CAPSULE, EXTENDED RELEASE ORAL at 08:28

## 2019-04-15 RX ADMIN — Medication 400 MILLIGRAM(S): at 09:00

## 2019-04-15 RX ADMIN — Medication 400 MILLIGRAM(S): at 00:25

## 2019-04-15 RX ADMIN — CARVEDILOL PHOSPHATE 6.25 MILLIGRAM(S): 80 CAPSULE, EXTENDED RELEASE ORAL at 20:57

## 2019-04-15 RX ADMIN — INSULIN GLARGINE 30 UNIT(S): 100 INJECTION, SOLUTION SUBCUTANEOUS at 20:58

## 2019-04-15 RX ADMIN — PANTOPRAZOLE SODIUM 40 MILLIGRAM(S): 20 TABLET, DELAYED RELEASE ORAL at 08:29

## 2019-04-15 RX ADMIN — Medication 400 MILLIGRAM(S): at 10:00

## 2019-04-16 PROCEDURE — 99232 SBSQ HOSP IP/OBS MODERATE 35: CPT

## 2019-04-16 RX ORDER — VALACYCLOVIR 500 MG/1
500 TABLET, FILM COATED ORAL DAILY
Qty: 0 | Refills: 0 | Status: DISCONTINUED | OUTPATIENT
Start: 2019-04-16 | End: 2019-05-01

## 2019-04-16 RX ORDER — LAMOTRIGINE 25 MG/1
25 TABLET, ORALLY DISINTEGRATING ORAL DAILY
Qty: 0 | Refills: 0 | Status: DISCONTINUED | OUTPATIENT
Start: 2019-04-16 | End: 2019-04-18

## 2019-04-16 RX ADMIN — QUETIAPINE FUMARATE 150 MILLIGRAM(S): 200 TABLET, FILM COATED ORAL at 21:34

## 2019-04-16 RX ADMIN — Medication 2 MILLIGRAM(S): at 17:10

## 2019-04-16 RX ADMIN — Medication 400 MILLIGRAM(S): at 23:15

## 2019-04-16 RX ADMIN — INSULIN GLARGINE 30 UNIT(S): 100 INJECTION, SOLUTION SUBCUTANEOUS at 21:33

## 2019-04-16 RX ADMIN — ATORVASTATIN CALCIUM 20 MILLIGRAM(S): 80 TABLET, FILM COATED ORAL at 21:33

## 2019-04-16 RX ADMIN — CARVEDILOL PHOSPHATE 6.25 MILLIGRAM(S): 80 CAPSULE, EXTENDED RELEASE ORAL at 09:23

## 2019-04-16 RX ADMIN — PANTOPRAZOLE SODIUM 40 MILLIGRAM(S): 20 TABLET, DELAYED RELEASE ORAL at 09:24

## 2019-04-16 RX ADMIN — LAMOTRIGINE 25 MILLIGRAM(S): 25 TABLET, ORALLY DISINTEGRATING ORAL at 09:23

## 2019-04-16 RX ADMIN — LOSARTAN POTASSIUM 100 MILLIGRAM(S): 100 TABLET, FILM COATED ORAL at 09:23

## 2019-04-16 RX ADMIN — Medication 2: at 12:11

## 2019-04-16 RX ADMIN — METFORMIN HYDROCHLORIDE 1000 MILLIGRAM(S): 850 TABLET ORAL at 09:23

## 2019-04-16 RX ADMIN — Medication 0.5 MILLIGRAM(S): at 21:33

## 2019-04-16 RX ADMIN — CARVEDILOL PHOSPHATE 6.25 MILLIGRAM(S): 80 CAPSULE, EXTENDED RELEASE ORAL at 21:33

## 2019-04-16 RX ADMIN — METFORMIN HYDROCHLORIDE 1000 MILLIGRAM(S): 850 TABLET ORAL at 21:34

## 2019-04-16 RX ADMIN — Medication 2: at 09:20

## 2019-04-16 RX ADMIN — Medication 4 MILLIGRAM(S): at 09:23

## 2019-04-16 RX ADMIN — Medication 1 TABLET(S): at 09:24

## 2019-04-16 RX ADMIN — AMLODIPINE BESYLATE 10 MILLIGRAM(S): 2.5 TABLET ORAL at 09:22

## 2019-04-16 RX ADMIN — Medication 1: at 21:34

## 2019-04-16 RX ADMIN — Medication 50 MILLIGRAM(S): at 17:10

## 2019-04-17 PROCEDURE — 99232 SBSQ HOSP IP/OBS MODERATE 35: CPT

## 2019-04-17 RX ORDER — CLONAZEPAM 1 MG
0.5 TABLET ORAL AT BEDTIME
Qty: 0 | Refills: 0 | Status: DISCONTINUED | OUTPATIENT
Start: 2019-04-17 | End: 2019-04-23

## 2019-04-17 RX ORDER — QUETIAPINE FUMARATE 200 MG/1
200 TABLET, FILM COATED ORAL AT BEDTIME
Qty: 0 | Refills: 0 | Status: DISCONTINUED | OUTPATIENT
Start: 2019-04-17 | End: 2019-04-22

## 2019-04-17 RX ADMIN — Medication 3: at 12:00

## 2019-04-17 RX ADMIN — Medication 3: at 21:14

## 2019-04-17 RX ADMIN — Medication 1 TABLET(S): at 09:23

## 2019-04-17 RX ADMIN — PANTOPRAZOLE SODIUM 40 MILLIGRAM(S): 20 TABLET, DELAYED RELEASE ORAL at 13:57

## 2019-04-17 RX ADMIN — AMLODIPINE BESYLATE 10 MILLIGRAM(S): 2.5 TABLET ORAL at 09:12

## 2019-04-17 RX ADMIN — Medication 4 MILLIGRAM(S): at 09:12

## 2019-04-17 RX ADMIN — Medication 2 MILLIGRAM(S): at 13:00

## 2019-04-17 RX ADMIN — Medication 1: at 08:30

## 2019-04-17 RX ADMIN — INSULIN GLARGINE 30 UNIT(S): 100 INJECTION, SOLUTION SUBCUTANEOUS at 21:14

## 2019-04-17 RX ADMIN — Medication 0.5 MILLIGRAM(S): at 21:14

## 2019-04-17 RX ADMIN — Medication 50 MILLIGRAM(S): at 13:57

## 2019-04-17 RX ADMIN — Medication 2: at 17:40

## 2019-04-17 RX ADMIN — CARVEDILOL PHOSPHATE 6.25 MILLIGRAM(S): 80 CAPSULE, EXTENDED RELEASE ORAL at 21:14

## 2019-04-17 RX ADMIN — CARVEDILOL PHOSPHATE 6.25 MILLIGRAM(S): 80 CAPSULE, EXTENDED RELEASE ORAL at 09:12

## 2019-04-17 RX ADMIN — VALACYCLOVIR 500 MILLIGRAM(S): 500 TABLET, FILM COATED ORAL at 09:23

## 2019-04-17 RX ADMIN — METFORMIN HYDROCHLORIDE 1000 MILLIGRAM(S): 850 TABLET ORAL at 21:15

## 2019-04-17 RX ADMIN — LOSARTAN POTASSIUM 100 MILLIGRAM(S): 100 TABLET, FILM COATED ORAL at 09:23

## 2019-04-17 RX ADMIN — ATORVASTATIN CALCIUM 20 MILLIGRAM(S): 80 TABLET, FILM COATED ORAL at 21:14

## 2019-04-17 RX ADMIN — LAMOTRIGINE 25 MILLIGRAM(S): 25 TABLET, ORALLY DISINTEGRATING ORAL at 09:23

## 2019-04-17 RX ADMIN — QUETIAPINE FUMARATE 200 MILLIGRAM(S): 200 TABLET, FILM COATED ORAL at 21:16

## 2019-04-17 RX ADMIN — Medication 400 MILLIGRAM(S): at 00:15

## 2019-04-18 PROCEDURE — 90853 GROUP PSYCHOTHERAPY: CPT

## 2019-04-18 PROCEDURE — 99232 SBSQ HOSP IP/OBS MODERATE 35: CPT

## 2019-04-18 RX ORDER — LAMOTRIGINE 25 MG/1
50 TABLET, ORALLY DISINTEGRATING ORAL DAILY
Qty: 0 | Refills: 0 | Status: DISCONTINUED | OUTPATIENT
Start: 2019-04-18 | End: 2019-04-24

## 2019-04-18 RX ADMIN — Medication 30 MILLILITER(S): at 22:30

## 2019-04-18 RX ADMIN — Medication 2: at 08:26

## 2019-04-18 RX ADMIN — Medication 2: at 21:10

## 2019-04-18 RX ADMIN — INSULIN GLARGINE 30 UNIT(S): 100 INJECTION, SOLUTION SUBCUTANEOUS at 21:32

## 2019-04-18 RX ADMIN — Medication 3: at 12:00

## 2019-04-18 RX ADMIN — LOSARTAN POTASSIUM 100 MILLIGRAM(S): 100 TABLET, FILM COATED ORAL at 09:38

## 2019-04-18 RX ADMIN — AMLODIPINE BESYLATE 10 MILLIGRAM(S): 2.5 TABLET ORAL at 09:37

## 2019-04-18 RX ADMIN — Medication 2 MILLIGRAM(S): at 17:26

## 2019-04-18 RX ADMIN — CARVEDILOL PHOSPHATE 6.25 MILLIGRAM(S): 80 CAPSULE, EXTENDED RELEASE ORAL at 09:37

## 2019-04-18 RX ADMIN — METFORMIN HYDROCHLORIDE 1000 MILLIGRAM(S): 850 TABLET ORAL at 22:37

## 2019-04-18 RX ADMIN — QUETIAPINE FUMARATE 200 MILLIGRAM(S): 200 TABLET, FILM COATED ORAL at 22:37

## 2019-04-18 RX ADMIN — METFORMIN HYDROCHLORIDE 1000 MILLIGRAM(S): 850 TABLET ORAL at 09:38

## 2019-04-18 RX ADMIN — Medication 4 MILLIGRAM(S): at 09:38

## 2019-04-18 RX ADMIN — Medication 1 TABLET(S): at 09:38

## 2019-04-18 RX ADMIN — Medication 2: at 17:30

## 2019-04-18 RX ADMIN — VALACYCLOVIR 500 MILLIGRAM(S): 500 TABLET, FILM COATED ORAL at 09:38

## 2019-04-18 RX ADMIN — ATORVASTATIN CALCIUM 20 MILLIGRAM(S): 80 TABLET, FILM COATED ORAL at 22:32

## 2019-04-18 RX ADMIN — LAMOTRIGINE 25 MILLIGRAM(S): 25 TABLET, ORALLY DISINTEGRATING ORAL at 09:38

## 2019-04-18 RX ADMIN — Medication 0.5 MILLIGRAM(S): at 23:22

## 2019-04-18 RX ADMIN — CARVEDILOL PHOSPHATE 6.25 MILLIGRAM(S): 80 CAPSULE, EXTENDED RELEASE ORAL at 22:32

## 2019-04-19 PROCEDURE — 99233 SBSQ HOSP IP/OBS HIGH 50: CPT

## 2019-04-19 RX ORDER — BENZOCAINE AND MENTHOL 5; 1 G/100ML; G/100ML
1 LIQUID ORAL
Qty: 0 | Refills: 0 | Status: DISCONTINUED | OUTPATIENT
Start: 2019-04-19 | End: 2019-05-01

## 2019-04-19 RX ORDER — MECLIZINE HCL 12.5 MG
25 TABLET ORAL
Qty: 0 | Refills: 0 | Status: DISCONTINUED | OUTPATIENT
Start: 2019-04-19 | End: 2019-05-01

## 2019-04-19 RX ADMIN — AMLODIPINE BESYLATE 10 MILLIGRAM(S): 2.5 TABLET ORAL at 09:35

## 2019-04-19 RX ADMIN — Medication 2: at 09:00

## 2019-04-19 RX ADMIN — PANTOPRAZOLE SODIUM 40 MILLIGRAM(S): 20 TABLET, DELAYED RELEASE ORAL at 09:37

## 2019-04-19 RX ADMIN — ATORVASTATIN CALCIUM 20 MILLIGRAM(S): 80 TABLET, FILM COATED ORAL at 21:18

## 2019-04-19 RX ADMIN — CARVEDILOL PHOSPHATE 6.25 MILLIGRAM(S): 80 CAPSULE, EXTENDED RELEASE ORAL at 09:35

## 2019-04-19 RX ADMIN — INSULIN GLARGINE 30 UNIT(S): 100 INJECTION, SOLUTION SUBCUTANEOUS at 21:36

## 2019-04-19 RX ADMIN — VALACYCLOVIR 500 MILLIGRAM(S): 500 TABLET, FILM COATED ORAL at 09:37

## 2019-04-19 RX ADMIN — Medication 4 MILLIGRAM(S): at 09:35

## 2019-04-19 RX ADMIN — LAMOTRIGINE 50 MILLIGRAM(S): 25 TABLET, ORALLY DISINTEGRATING ORAL at 09:37

## 2019-04-19 RX ADMIN — METFORMIN HYDROCHLORIDE 1000 MILLIGRAM(S): 850 TABLET ORAL at 21:19

## 2019-04-19 RX ADMIN — Medication 1 TABLET(S): at 09:37

## 2019-04-19 RX ADMIN — CARVEDILOL PHOSPHATE 6.25 MILLIGRAM(S): 80 CAPSULE, EXTENDED RELEASE ORAL at 21:18

## 2019-04-19 RX ADMIN — Medication 30 MILLILITER(S): at 18:02

## 2019-04-19 RX ADMIN — Medication 0.5 MILLIGRAM(S): at 21:18

## 2019-04-19 RX ADMIN — Medication 1: at 17:10

## 2019-04-19 RX ADMIN — LOSARTAN POTASSIUM 100 MILLIGRAM(S): 100 TABLET, FILM COATED ORAL at 09:37

## 2019-04-19 RX ADMIN — PANTOPRAZOLE SODIUM 40 MILLIGRAM(S): 20 TABLET, DELAYED RELEASE ORAL at 09:52

## 2019-04-19 RX ADMIN — QUETIAPINE FUMARATE 200 MILLIGRAM(S): 200 TABLET, FILM COATED ORAL at 21:19

## 2019-04-19 RX ADMIN — Medication 3: at 12:30

## 2019-04-19 RX ADMIN — METFORMIN HYDROCHLORIDE 1000 MILLIGRAM(S): 850 TABLET ORAL at 09:37

## 2019-04-19 RX ADMIN — Medication 2 MILLIGRAM(S): at 14:38

## 2019-04-20 RX ADMIN — Medication 1: at 21:25

## 2019-04-20 RX ADMIN — Medication 1 TABLET(S): at 09:00

## 2019-04-20 RX ADMIN — ATORVASTATIN CALCIUM 20 MILLIGRAM(S): 80 TABLET, FILM COATED ORAL at 21:24

## 2019-04-20 RX ADMIN — VALACYCLOVIR 500 MILLIGRAM(S): 500 TABLET, FILM COATED ORAL at 09:00

## 2019-04-20 RX ADMIN — LOSARTAN POTASSIUM 100 MILLIGRAM(S): 100 TABLET, FILM COATED ORAL at 09:00

## 2019-04-20 RX ADMIN — CARVEDILOL PHOSPHATE 6.25 MILLIGRAM(S): 80 CAPSULE, EXTENDED RELEASE ORAL at 21:24

## 2019-04-20 RX ADMIN — Medication 4 MILLIGRAM(S): at 09:00

## 2019-04-20 RX ADMIN — LAMOTRIGINE 50 MILLIGRAM(S): 25 TABLET, ORALLY DISINTEGRATING ORAL at 09:00

## 2019-04-20 RX ADMIN — METFORMIN HYDROCHLORIDE 1000 MILLIGRAM(S): 850 TABLET ORAL at 21:25

## 2019-04-20 RX ADMIN — Medication 400 MILLIGRAM(S): at 21:25

## 2019-04-20 RX ADMIN — Medication 2: at 16:55

## 2019-04-20 RX ADMIN — Medication 0.5 MILLIGRAM(S): at 21:24

## 2019-04-20 RX ADMIN — Medication 4: at 12:59

## 2019-04-20 RX ADMIN — QUETIAPINE FUMARATE 200 MILLIGRAM(S): 200 TABLET, FILM COATED ORAL at 21:25

## 2019-04-20 RX ADMIN — METFORMIN HYDROCHLORIDE 1000 MILLIGRAM(S): 850 TABLET ORAL at 09:00

## 2019-04-20 RX ADMIN — AMLODIPINE BESYLATE 10 MILLIGRAM(S): 2.5 TABLET ORAL at 09:00

## 2019-04-20 RX ADMIN — Medication 1: at 09:00

## 2019-04-20 RX ADMIN — CARVEDILOL PHOSPHATE 6.25 MILLIGRAM(S): 80 CAPSULE, EXTENDED RELEASE ORAL at 09:00

## 2019-04-20 RX ADMIN — INSULIN GLARGINE 30 UNIT(S): 100 INJECTION, SOLUTION SUBCUTANEOUS at 21:24

## 2019-04-20 RX ADMIN — Medication 2 MILLIGRAM(S): at 21:25

## 2019-04-21 RX ORDER — BACITRACIN ZINC 500 UNIT/G
1 OINTMENT IN PACKET (EA) TOPICAL EVERY 8 HOURS
Qty: 0 | Refills: 0 | Status: DISCONTINUED | OUTPATIENT
Start: 2019-04-21 | End: 2019-05-01

## 2019-04-21 RX ADMIN — Medication 3: at 18:02

## 2019-04-21 RX ADMIN — LOSARTAN POTASSIUM 100 MILLIGRAM(S): 100 TABLET, FILM COATED ORAL at 08:48

## 2019-04-21 RX ADMIN — LAMOTRIGINE 50 MILLIGRAM(S): 25 TABLET, ORALLY DISINTEGRATING ORAL at 12:50

## 2019-04-21 RX ADMIN — Medication 2 MILLIGRAM(S): at 22:07

## 2019-04-21 RX ADMIN — Medication 2: at 12:32

## 2019-04-21 RX ADMIN — Medication 1 TABLET(S): at 08:48

## 2019-04-21 RX ADMIN — Medication 4 MILLIGRAM(S): at 08:48

## 2019-04-21 RX ADMIN — Medication 3: at 08:25

## 2019-04-21 RX ADMIN — ATORVASTATIN CALCIUM 20 MILLIGRAM(S): 80 TABLET, FILM COATED ORAL at 21:40

## 2019-04-21 RX ADMIN — PANTOPRAZOLE SODIUM 40 MILLIGRAM(S): 20 TABLET, DELAYED RELEASE ORAL at 08:48

## 2019-04-21 RX ADMIN — QUETIAPINE FUMARATE 200 MILLIGRAM(S): 200 TABLET, FILM COATED ORAL at 22:04

## 2019-04-21 RX ADMIN — VALACYCLOVIR 500 MILLIGRAM(S): 500 TABLET, FILM COATED ORAL at 08:48

## 2019-04-21 RX ADMIN — CARVEDILOL PHOSPHATE 6.25 MILLIGRAM(S): 80 CAPSULE, EXTENDED RELEASE ORAL at 08:48

## 2019-04-21 RX ADMIN — Medication 2 MILLIGRAM(S): at 08:49

## 2019-04-21 RX ADMIN — INSULIN GLARGINE 30 UNIT(S): 100 INJECTION, SOLUTION SUBCUTANEOUS at 21:41

## 2019-04-21 RX ADMIN — METFORMIN HYDROCHLORIDE 1000 MILLIGRAM(S): 850 TABLET ORAL at 08:48

## 2019-04-21 RX ADMIN — AMLODIPINE BESYLATE 10 MILLIGRAM(S): 2.5 TABLET ORAL at 08:49

## 2019-04-21 RX ADMIN — Medication 0.5 MILLIGRAM(S): at 21:41

## 2019-04-21 RX ADMIN — CARVEDILOL PHOSPHATE 6.25 MILLIGRAM(S): 80 CAPSULE, EXTENDED RELEASE ORAL at 21:40

## 2019-04-21 RX ADMIN — Medication 2: at 21:48

## 2019-04-21 RX ADMIN — METFORMIN HYDROCHLORIDE 1000 MILLIGRAM(S): 850 TABLET ORAL at 22:04

## 2019-04-22 ENCOUNTER — EMERGENCY (EMERGENCY)
Facility: HOSPITAL | Age: 59
LOS: 1 days | Discharge: HOME | End: 2019-04-22
Admitting: EMERGENCY MEDICINE
Payer: MEDICAID

## 2019-04-22 VITALS
HEART RATE: 93 BPM | DIASTOLIC BLOOD PRESSURE: 85 MMHG | OXYGEN SATURATION: 100 % | RESPIRATION RATE: 91 BRPM | TEMPERATURE: 98 F | SYSTOLIC BLOOD PRESSURE: 142 MMHG

## 2019-04-22 PROCEDURE — 99223 1ST HOSP IP/OBS HIGH 75: CPT

## 2019-04-22 PROCEDURE — 99284 EMERGENCY DEPT VISIT MOD MDM: CPT

## 2019-04-22 PROCEDURE — 72125 CT NECK SPINE W/O DYE: CPT | Mod: 26

## 2019-04-22 PROCEDURE — 70450 CT HEAD/BRAIN W/O DYE: CPT | Mod: 26

## 2019-04-22 RX ORDER — OXYCODONE AND ACETAMINOPHEN 5; 325 MG/1; MG/1
1 TABLET ORAL EVERY 6 HOURS
Qty: 0 | Refills: 0 | Status: DISCONTINUED | OUTPATIENT
Start: 2019-04-22 | End: 2019-04-22

## 2019-04-22 RX ORDER — QUETIAPINE FUMARATE 200 MG/1
300 TABLET, FILM COATED ORAL AT BEDTIME
Qty: 0 | Refills: 0 | Status: DISCONTINUED | OUTPATIENT
Start: 2019-04-22 | End: 2019-04-26

## 2019-04-22 RX ADMIN — Medication 4 MILLIGRAM(S): at 08:36

## 2019-04-22 RX ADMIN — Medication 1: at 12:12

## 2019-04-22 RX ADMIN — Medication 400 MILLIGRAM(S): at 01:50

## 2019-04-22 RX ADMIN — QUETIAPINE FUMARATE 300 MILLIGRAM(S): 200 TABLET, FILM COATED ORAL at 21:40

## 2019-04-22 RX ADMIN — INSULIN GLARGINE 30 UNIT(S): 100 INJECTION, SOLUTION SUBCUTANEOUS at 21:40

## 2019-04-22 RX ADMIN — CARVEDILOL PHOSPHATE 6.25 MILLIGRAM(S): 80 CAPSULE, EXTENDED RELEASE ORAL at 21:40

## 2019-04-22 RX ADMIN — METFORMIN HYDROCHLORIDE 1000 MILLIGRAM(S): 850 TABLET ORAL at 21:40

## 2019-04-22 RX ADMIN — OXYCODONE AND ACETAMINOPHEN 1 TABLET(S): 5; 325 TABLET ORAL at 14:45

## 2019-04-22 RX ADMIN — PANTOPRAZOLE SODIUM 40 MILLIGRAM(S): 20 TABLET, DELAYED RELEASE ORAL at 08:35

## 2019-04-22 RX ADMIN — VALACYCLOVIR 500 MILLIGRAM(S): 500 TABLET, FILM COATED ORAL at 08:36

## 2019-04-22 RX ADMIN — Medication 400 MILLIGRAM(S): at 00:52

## 2019-04-22 RX ADMIN — LOSARTAN POTASSIUM 100 MILLIGRAM(S): 100 TABLET, FILM COATED ORAL at 08:35

## 2019-04-22 RX ADMIN — CARVEDILOL PHOSPHATE 6.25 MILLIGRAM(S): 80 CAPSULE, EXTENDED RELEASE ORAL at 08:36

## 2019-04-22 RX ADMIN — Medication 400 MILLIGRAM(S): at 08:35

## 2019-04-22 RX ADMIN — Medication 1 TABLET(S): at 08:35

## 2019-04-22 RX ADMIN — OXYCODONE AND ACETAMINOPHEN 1 TABLET(S): 5; 325 TABLET ORAL at 14:00

## 2019-04-22 RX ADMIN — METFORMIN HYDROCHLORIDE 1000 MILLIGRAM(S): 850 TABLET ORAL at 08:35

## 2019-04-22 RX ADMIN — LAMOTRIGINE 50 MILLIGRAM(S): 25 TABLET, ORALLY DISINTEGRATING ORAL at 08:35

## 2019-04-22 RX ADMIN — Medication 0.5 MILLIGRAM(S): at 21:40

## 2019-04-22 RX ADMIN — AMLODIPINE BESYLATE 10 MILLIGRAM(S): 2.5 TABLET ORAL at 08:36

## 2019-04-22 RX ADMIN — Medication 1: at 08:20

## 2019-04-22 RX ADMIN — ATORVASTATIN CALCIUM 20 MILLIGRAM(S): 80 TABLET, FILM COATED ORAL at 21:40

## 2019-04-22 RX ADMIN — Medication 400 MILLIGRAM(S): at 09:20

## 2019-04-22 RX ADMIN — Medication 400 MILLIGRAM(S): at 21:42

## 2019-04-22 NOTE — ED PROVIDER NOTE - NSFOLLOWUPINSTRUCTIONS_ED_ALL_ED_FT
Take tylenol 650mg every 6-8 hours as needed for pain.  Heat packs to affected area.  Drink lots of fluids.  Follow up with PMD in 2 days.  Return to ED for any worsening pain, vomiting or any other concerning symptoms.
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
## 2019-04-23 RX ORDER — CLONAZEPAM 1 MG
0.5 TABLET ORAL AT BEDTIME
Qty: 0 | Refills: 0 | Status: DISCONTINUED | OUTPATIENT
Start: 2019-04-23 | End: 2019-04-30

## 2019-04-23 RX ADMIN — AMLODIPINE BESYLATE 10 MILLIGRAM(S): 2.5 TABLET ORAL at 08:35

## 2019-04-23 RX ADMIN — Medication 2: at 16:53

## 2019-04-23 RX ADMIN — Medication 4 MILLIGRAM(S): at 08:35

## 2019-04-23 RX ADMIN — Medication 400 MILLIGRAM(S): at 09:45

## 2019-04-23 RX ADMIN — Medication 2 MILLIGRAM(S): at 19:40

## 2019-04-23 RX ADMIN — CARVEDILOL PHOSPHATE 6.25 MILLIGRAM(S): 80 CAPSULE, EXTENDED RELEASE ORAL at 20:28

## 2019-04-23 RX ADMIN — LOSARTAN POTASSIUM 100 MILLIGRAM(S): 100 TABLET, FILM COATED ORAL at 08:35

## 2019-04-23 RX ADMIN — METFORMIN HYDROCHLORIDE 1000 MILLIGRAM(S): 850 TABLET ORAL at 08:34

## 2019-04-23 RX ADMIN — Medication 1 TABLET(S): at 08:34

## 2019-04-23 RX ADMIN — PANTOPRAZOLE SODIUM 40 MILLIGRAM(S): 20 TABLET, DELAYED RELEASE ORAL at 08:34

## 2019-04-23 RX ADMIN — METFORMIN HYDROCHLORIDE 1000 MILLIGRAM(S): 850 TABLET ORAL at 20:28

## 2019-04-23 RX ADMIN — LAMOTRIGINE 50 MILLIGRAM(S): 25 TABLET, ORALLY DISINTEGRATING ORAL at 08:35

## 2019-04-23 RX ADMIN — Medication 400 MILLIGRAM(S): at 08:35

## 2019-04-23 RX ADMIN — QUETIAPINE FUMARATE 300 MILLIGRAM(S): 200 TABLET, FILM COATED ORAL at 20:28

## 2019-04-23 RX ADMIN — ATORVASTATIN CALCIUM 20 MILLIGRAM(S): 80 TABLET, FILM COATED ORAL at 20:28

## 2019-04-23 RX ADMIN — INSULIN GLARGINE 30 UNIT(S): 100 INJECTION, SOLUTION SUBCUTANEOUS at 21:24

## 2019-04-23 RX ADMIN — Medication 400 MILLIGRAM(S): at 17:07

## 2019-04-23 RX ADMIN — CARVEDILOL PHOSPHATE 6.25 MILLIGRAM(S): 80 CAPSULE, EXTENDED RELEASE ORAL at 08:35

## 2019-04-23 RX ADMIN — Medication 1: at 08:20

## 2019-04-23 RX ADMIN — VALACYCLOVIR 500 MILLIGRAM(S): 500 TABLET, FILM COATED ORAL at 08:34

## 2019-04-23 RX ADMIN — Medication 400 MILLIGRAM(S): at 17:45

## 2019-04-23 RX ADMIN — Medication 0.5 MILLIGRAM(S): at 20:00

## 2019-04-24 PROCEDURE — 99232 SBSQ HOSP IP/OBS MODERATE 35: CPT

## 2019-04-24 RX ORDER — LORATADINE 10 MG/1
10 TABLET ORAL DAILY
Qty: 0 | Refills: 0 | Status: DISCONTINUED | OUTPATIENT
Start: 2019-04-24 | End: 2019-05-01

## 2019-04-24 RX ORDER — LAMOTRIGINE 25 MG/1
75 TABLET, ORALLY DISINTEGRATING ORAL DAILY
Qty: 0 | Refills: 0 | Status: DISCONTINUED | OUTPATIENT
Start: 2019-04-24 | End: 2019-05-01

## 2019-04-24 RX ADMIN — Medication 4 MILLIGRAM(S): at 08:43

## 2019-04-24 RX ADMIN — Medication 1 TABLET(S): at 08:43

## 2019-04-24 RX ADMIN — QUETIAPINE FUMARATE 300 MILLIGRAM(S): 200 TABLET, FILM COATED ORAL at 21:27

## 2019-04-24 RX ADMIN — LORATADINE 10 MILLIGRAM(S): 10 TABLET ORAL at 08:43

## 2019-04-24 RX ADMIN — Medication 1: at 21:20

## 2019-04-24 RX ADMIN — CARVEDILOL PHOSPHATE 6.25 MILLIGRAM(S): 80 CAPSULE, EXTENDED RELEASE ORAL at 21:26

## 2019-04-24 RX ADMIN — AMLODIPINE BESYLATE 10 MILLIGRAM(S): 2.5 TABLET ORAL at 08:43

## 2019-04-24 RX ADMIN — Medication 0.5 MILLIGRAM(S): at 21:26

## 2019-04-24 RX ADMIN — CARVEDILOL PHOSPHATE 6.25 MILLIGRAM(S): 80 CAPSULE, EXTENDED RELEASE ORAL at 08:43

## 2019-04-24 RX ADMIN — Medication 400 MILLIGRAM(S): at 08:51

## 2019-04-24 RX ADMIN — LOSARTAN POTASSIUM 100 MILLIGRAM(S): 100 TABLET, FILM COATED ORAL at 08:43

## 2019-04-24 RX ADMIN — Medication 1: at 08:17

## 2019-04-24 RX ADMIN — Medication 2 MILLIGRAM(S): at 16:54

## 2019-04-24 RX ADMIN — Medication 2: at 16:55

## 2019-04-24 RX ADMIN — METFORMIN HYDROCHLORIDE 1000 MILLIGRAM(S): 850 TABLET ORAL at 21:27

## 2019-04-24 RX ADMIN — LAMOTRIGINE 50 MILLIGRAM(S): 25 TABLET, ORALLY DISINTEGRATING ORAL at 08:43

## 2019-04-24 RX ADMIN — PANTOPRAZOLE SODIUM 40 MILLIGRAM(S): 20 TABLET, DELAYED RELEASE ORAL at 08:43

## 2019-04-24 RX ADMIN — VALACYCLOVIR 500 MILLIGRAM(S): 500 TABLET, FILM COATED ORAL at 08:43

## 2019-04-24 RX ADMIN — METFORMIN HYDROCHLORIDE 1000 MILLIGRAM(S): 850 TABLET ORAL at 08:43

## 2019-04-24 RX ADMIN — Medication 50 MILLIGRAM(S): at 15:35

## 2019-04-24 RX ADMIN — INSULIN GLARGINE 30 UNIT(S): 100 INJECTION, SOLUTION SUBCUTANEOUS at 21:23

## 2019-04-24 RX ADMIN — ATORVASTATIN CALCIUM 20 MILLIGRAM(S): 80 TABLET, FILM COATED ORAL at 21:26

## 2019-04-24 RX ADMIN — Medication 400 MILLIGRAM(S): at 22:16

## 2019-04-24 RX ADMIN — Medication 400 MILLIGRAM(S): at 09:32

## 2019-04-24 RX ADMIN — Medication 400 MILLIGRAM(S): at 21:27

## 2019-04-25 PROCEDURE — 99232 SBSQ HOSP IP/OBS MODERATE 35: CPT

## 2019-04-25 RX ORDER — KETOTIFEN FUMARATE 0.34 MG/ML
1 SOLUTION OPHTHALMIC EVERY 12 HOURS
Qty: 0 | Refills: 0 | Status: DISCONTINUED | OUTPATIENT
Start: 2019-04-25 | End: 2019-05-01

## 2019-04-25 RX ADMIN — METFORMIN HYDROCHLORIDE 1000 MILLIGRAM(S): 850 TABLET ORAL at 20:18

## 2019-04-25 RX ADMIN — Medication 2: at 12:20

## 2019-04-25 RX ADMIN — Medication 1: at 20:17

## 2019-04-25 RX ADMIN — Medication 1: at 08:06

## 2019-04-25 RX ADMIN — Medication 4 MILLIGRAM(S): at 08:13

## 2019-04-25 RX ADMIN — METFORMIN HYDROCHLORIDE 1000 MILLIGRAM(S): 850 TABLET ORAL at 08:13

## 2019-04-25 RX ADMIN — INSULIN GLARGINE 30 UNIT(S): 100 INJECTION, SOLUTION SUBCUTANEOUS at 20:17

## 2019-04-25 RX ADMIN — LORATADINE 10 MILLIGRAM(S): 10 TABLET ORAL at 08:13

## 2019-04-25 RX ADMIN — QUETIAPINE FUMARATE 300 MILLIGRAM(S): 200 TABLET, FILM COATED ORAL at 20:18

## 2019-04-25 RX ADMIN — PANTOPRAZOLE SODIUM 40 MILLIGRAM(S): 20 TABLET, DELAYED RELEASE ORAL at 08:13

## 2019-04-25 RX ADMIN — Medication 1 TABLET(S): at 08:13

## 2019-04-25 RX ADMIN — CARVEDILOL PHOSPHATE 6.25 MILLIGRAM(S): 80 CAPSULE, EXTENDED RELEASE ORAL at 20:18

## 2019-04-25 RX ADMIN — CARVEDILOL PHOSPHATE 6.25 MILLIGRAM(S): 80 CAPSULE, EXTENDED RELEASE ORAL at 08:13

## 2019-04-25 RX ADMIN — LAMOTRIGINE 75 MILLIGRAM(S): 25 TABLET, ORALLY DISINTEGRATING ORAL at 08:13

## 2019-04-25 RX ADMIN — AMLODIPINE BESYLATE 10 MILLIGRAM(S): 2.5 TABLET ORAL at 08:13

## 2019-04-25 RX ADMIN — Medication 2 MILLIGRAM(S): at 15:37

## 2019-04-25 RX ADMIN — ATORVASTATIN CALCIUM 20 MILLIGRAM(S): 80 TABLET, FILM COATED ORAL at 20:18

## 2019-04-25 RX ADMIN — KETOTIFEN FUMARATE 1 DROP(S): 0.34 SOLUTION OPHTHALMIC at 12:19

## 2019-04-25 RX ADMIN — VALACYCLOVIR 500 MILLIGRAM(S): 500 TABLET, FILM COATED ORAL at 08:13

## 2019-04-25 RX ADMIN — LOSARTAN POTASSIUM 100 MILLIGRAM(S): 100 TABLET, FILM COATED ORAL at 08:13

## 2019-04-25 RX ADMIN — Medication 0.5 MILLIGRAM(S): at 20:18

## 2019-04-25 RX ADMIN — Medication 1: at 17:03

## 2019-04-25 RX ADMIN — KETOTIFEN FUMARATE 1 DROP(S): 0.34 SOLUTION OPHTHALMIC at 20:18

## 2019-04-26 PROCEDURE — 99232 SBSQ HOSP IP/OBS MODERATE 35: CPT

## 2019-04-26 RX ORDER — QUETIAPINE FUMARATE 200 MG/1
400 TABLET, FILM COATED ORAL AT BEDTIME
Qty: 0 | Refills: 0 | Status: DISCONTINUED | OUTPATIENT
Start: 2019-04-26 | End: 2019-05-01

## 2019-04-26 RX ADMIN — INSULIN GLARGINE 30 UNIT(S): 100 INJECTION, SOLUTION SUBCUTANEOUS at 20:35

## 2019-04-26 RX ADMIN — QUETIAPINE FUMARATE 400 MILLIGRAM(S): 200 TABLET, FILM COATED ORAL at 20:38

## 2019-04-26 RX ADMIN — Medication 1 TABLET(S): at 08:23

## 2019-04-26 RX ADMIN — LOSARTAN POTASSIUM 100 MILLIGRAM(S): 100 TABLET, FILM COATED ORAL at 08:22

## 2019-04-26 RX ADMIN — Medication 2: at 07:40

## 2019-04-26 RX ADMIN — KETOTIFEN FUMARATE 1 DROP(S): 0.34 SOLUTION OPHTHALMIC at 08:21

## 2019-04-26 RX ADMIN — Medication 3: at 16:29

## 2019-04-26 RX ADMIN — PANTOPRAZOLE SODIUM 40 MILLIGRAM(S): 20 TABLET, DELAYED RELEASE ORAL at 08:23

## 2019-04-26 RX ADMIN — Medication 0.5 MILLIGRAM(S): at 20:38

## 2019-04-26 RX ADMIN — CARVEDILOL PHOSPHATE 6.25 MILLIGRAM(S): 80 CAPSULE, EXTENDED RELEASE ORAL at 20:38

## 2019-04-26 RX ADMIN — KETOTIFEN FUMARATE 1 DROP(S): 0.34 SOLUTION OPHTHALMIC at 20:38

## 2019-04-26 RX ADMIN — Medication 4 MILLIGRAM(S): at 08:21

## 2019-04-26 RX ADMIN — Medication 2: at 11:23

## 2019-04-26 RX ADMIN — ATORVASTATIN CALCIUM 20 MILLIGRAM(S): 80 TABLET, FILM COATED ORAL at 20:38

## 2019-04-26 RX ADMIN — METFORMIN HYDROCHLORIDE 1000 MILLIGRAM(S): 850 TABLET ORAL at 08:23

## 2019-04-26 RX ADMIN — CARVEDILOL PHOSPHATE 6.25 MILLIGRAM(S): 80 CAPSULE, EXTENDED RELEASE ORAL at 08:21

## 2019-04-26 RX ADMIN — LAMOTRIGINE 75 MILLIGRAM(S): 25 TABLET, ORALLY DISINTEGRATING ORAL at 08:22

## 2019-04-26 RX ADMIN — Medication 2 MILLIGRAM(S): at 13:46

## 2019-04-26 RX ADMIN — METFORMIN HYDROCHLORIDE 1000 MILLIGRAM(S): 850 TABLET ORAL at 20:38

## 2019-04-26 RX ADMIN — LORATADINE 10 MILLIGRAM(S): 10 TABLET ORAL at 08:22

## 2019-04-26 RX ADMIN — VALACYCLOVIR 500 MILLIGRAM(S): 500 TABLET, FILM COATED ORAL at 08:23

## 2019-04-26 RX ADMIN — Medication 0: at 23:17

## 2019-04-26 RX ADMIN — AMLODIPINE BESYLATE 10 MILLIGRAM(S): 2.5 TABLET ORAL at 08:21

## 2019-04-27 RX ADMIN — AMLODIPINE BESYLATE 10 MILLIGRAM(S): 2.5 TABLET ORAL at 08:47

## 2019-04-27 RX ADMIN — PANTOPRAZOLE SODIUM 40 MILLIGRAM(S): 20 TABLET, DELAYED RELEASE ORAL at 08:50

## 2019-04-27 RX ADMIN — Medication 2: at 11:58

## 2019-04-27 RX ADMIN — LOSARTAN POTASSIUM 100 MILLIGRAM(S): 100 TABLET, FILM COATED ORAL at 08:49

## 2019-04-27 RX ADMIN — METFORMIN HYDROCHLORIDE 1000 MILLIGRAM(S): 850 TABLET ORAL at 08:50

## 2019-04-27 RX ADMIN — Medication 3: at 16:47

## 2019-04-27 RX ADMIN — ATORVASTATIN CALCIUM 20 MILLIGRAM(S): 80 TABLET, FILM COATED ORAL at 20:43

## 2019-04-27 RX ADMIN — INSULIN GLARGINE 30 UNIT(S): 100 INJECTION, SOLUTION SUBCUTANEOUS at 20:44

## 2019-04-27 RX ADMIN — VALACYCLOVIR 500 MILLIGRAM(S): 500 TABLET, FILM COATED ORAL at 09:58

## 2019-04-27 RX ADMIN — CARVEDILOL PHOSPHATE 6.25 MILLIGRAM(S): 80 CAPSULE, EXTENDED RELEASE ORAL at 08:48

## 2019-04-27 RX ADMIN — QUETIAPINE FUMARATE 400 MILLIGRAM(S): 200 TABLET, FILM COATED ORAL at 20:45

## 2019-04-27 RX ADMIN — Medication 1 TABLET(S): at 08:50

## 2019-04-27 RX ADMIN — KETOTIFEN FUMARATE 1 DROP(S): 0.34 SOLUTION OPHTHALMIC at 20:45

## 2019-04-27 RX ADMIN — CARVEDILOL PHOSPHATE 6.25 MILLIGRAM(S): 80 CAPSULE, EXTENDED RELEASE ORAL at 20:43

## 2019-04-27 RX ADMIN — Medication 4 MILLIGRAM(S): at 08:48

## 2019-04-27 RX ADMIN — LORATADINE 10 MILLIGRAM(S): 10 TABLET ORAL at 08:49

## 2019-04-27 RX ADMIN — Medication 0.5 MILLIGRAM(S): at 20:43

## 2019-04-27 RX ADMIN — KETOTIFEN FUMARATE 1 DROP(S): 0.34 SOLUTION OPHTHALMIC at 08:48

## 2019-04-27 RX ADMIN — Medication 1: at 07:35

## 2019-04-27 RX ADMIN — METFORMIN HYDROCHLORIDE 1000 MILLIGRAM(S): 850 TABLET ORAL at 20:45

## 2019-04-27 RX ADMIN — LAMOTRIGINE 75 MILLIGRAM(S): 25 TABLET, ORALLY DISINTEGRATING ORAL at 08:49

## 2019-04-27 RX ADMIN — Medication 1: at 20:40

## 2019-04-28 RX ADMIN — Medication 2: at 07:45

## 2019-04-28 RX ADMIN — Medication 0.5 MILLIGRAM(S): at 20:30

## 2019-04-28 RX ADMIN — Medication 5: at 16:42

## 2019-04-28 RX ADMIN — Medication 400 MILLIGRAM(S): at 03:30

## 2019-04-28 RX ADMIN — CARVEDILOL PHOSPHATE 6.25 MILLIGRAM(S): 80 CAPSULE, EXTENDED RELEASE ORAL at 20:30

## 2019-04-28 RX ADMIN — PANTOPRAZOLE SODIUM 40 MILLIGRAM(S): 20 TABLET, DELAYED RELEASE ORAL at 08:52

## 2019-04-28 RX ADMIN — Medication 400 MILLIGRAM(S): at 23:10

## 2019-04-28 RX ADMIN — LAMOTRIGINE 75 MILLIGRAM(S): 25 TABLET, ORALLY DISINTEGRATING ORAL at 08:52

## 2019-04-28 RX ADMIN — Medication 4 MILLIGRAM(S): at 08:51

## 2019-04-28 RX ADMIN — ATORVASTATIN CALCIUM 20 MILLIGRAM(S): 80 TABLET, FILM COATED ORAL at 20:30

## 2019-04-28 RX ADMIN — INSULIN GLARGINE 30 UNIT(S): 100 INJECTION, SOLUTION SUBCUTANEOUS at 20:30

## 2019-04-28 RX ADMIN — LORATADINE 10 MILLIGRAM(S): 10 TABLET ORAL at 08:52

## 2019-04-28 RX ADMIN — METFORMIN HYDROCHLORIDE 1000 MILLIGRAM(S): 850 TABLET ORAL at 20:33

## 2019-04-28 RX ADMIN — METFORMIN HYDROCHLORIDE 1000 MILLIGRAM(S): 850 TABLET ORAL at 08:52

## 2019-04-28 RX ADMIN — Medication 400 MILLIGRAM(S): at 03:32

## 2019-04-28 RX ADMIN — Medication 4: at 12:11

## 2019-04-28 RX ADMIN — CARVEDILOL PHOSPHATE 6.25 MILLIGRAM(S): 80 CAPSULE, EXTENDED RELEASE ORAL at 08:51

## 2019-04-28 RX ADMIN — Medication 50 MILLIGRAM(S): at 14:38

## 2019-04-28 RX ADMIN — AMLODIPINE BESYLATE 10 MILLIGRAM(S): 2.5 TABLET ORAL at 08:51

## 2019-04-28 RX ADMIN — KETOTIFEN FUMARATE 1 DROP(S): 0.34 SOLUTION OPHTHALMIC at 20:31

## 2019-04-28 RX ADMIN — VALACYCLOVIR 500 MILLIGRAM(S): 500 TABLET, FILM COATED ORAL at 08:52

## 2019-04-28 RX ADMIN — KETOTIFEN FUMARATE 1 DROP(S): 0.34 SOLUTION OPHTHALMIC at 08:52

## 2019-04-28 RX ADMIN — Medication 2 MILLIGRAM(S): at 19:00

## 2019-04-28 RX ADMIN — LOSARTAN POTASSIUM 100 MILLIGRAM(S): 100 TABLET, FILM COATED ORAL at 08:52

## 2019-04-28 RX ADMIN — Medication 1 TABLET(S): at 08:52

## 2019-04-28 RX ADMIN — QUETIAPINE FUMARATE 400 MILLIGRAM(S): 200 TABLET, FILM COATED ORAL at 20:33

## 2019-04-29 PROCEDURE — 99231 SBSQ HOSP IP/OBS SF/LOW 25: CPT

## 2019-04-29 RX ORDER — INSULIN LISPRO 100/ML
VIAL (ML) SUBCUTANEOUS
Qty: 0 | Refills: 0 | Status: DISCONTINUED | OUTPATIENT
Start: 2019-04-29 | End: 2019-05-01

## 2019-04-29 RX ORDER — INSULIN LISPRO 100/ML
VIAL (ML) SUBCUTANEOUS AT BEDTIME
Qty: 0 | Refills: 0 | Status: DISCONTINUED | OUTPATIENT
Start: 2019-04-29 | End: 2019-05-01

## 2019-04-29 RX ADMIN — PANTOPRAZOLE SODIUM 40 MILLIGRAM(S): 20 TABLET, DELAYED RELEASE ORAL at 08:07

## 2019-04-29 RX ADMIN — QUETIAPINE FUMARATE 400 MILLIGRAM(S): 200 TABLET, FILM COATED ORAL at 20:13

## 2019-04-29 RX ADMIN — Medication 4 MILLIGRAM(S): at 08:06

## 2019-04-29 RX ADMIN — Medication 6: at 16:35

## 2019-04-29 RX ADMIN — Medication 1: at 08:06

## 2019-04-29 RX ADMIN — Medication 2: at 12:32

## 2019-04-29 RX ADMIN — LAMOTRIGINE 75 MILLIGRAM(S): 25 TABLET, ORALLY DISINTEGRATING ORAL at 08:07

## 2019-04-29 RX ADMIN — Medication 0.5 MILLIGRAM(S): at 20:13

## 2019-04-29 RX ADMIN — KETOTIFEN FUMARATE 1 DROP(S): 0.34 SOLUTION OPHTHALMIC at 08:07

## 2019-04-29 RX ADMIN — Medication 50 MILLIGRAM(S): at 16:34

## 2019-04-29 RX ADMIN — Medication 30 MILLILITER(S): at 08:34

## 2019-04-29 RX ADMIN — METFORMIN HYDROCHLORIDE 1000 MILLIGRAM(S): 850 TABLET ORAL at 20:13

## 2019-04-29 RX ADMIN — METFORMIN HYDROCHLORIDE 1000 MILLIGRAM(S): 850 TABLET ORAL at 08:07

## 2019-04-29 RX ADMIN — Medication 4: at 20:51

## 2019-04-29 RX ADMIN — CARVEDILOL PHOSPHATE 6.25 MILLIGRAM(S): 80 CAPSULE, EXTENDED RELEASE ORAL at 08:06

## 2019-04-29 RX ADMIN — LOSARTAN POTASSIUM 100 MILLIGRAM(S): 100 TABLET, FILM COATED ORAL at 08:07

## 2019-04-29 RX ADMIN — VALACYCLOVIR 500 MILLIGRAM(S): 500 TABLET, FILM COATED ORAL at 08:07

## 2019-04-29 RX ADMIN — CARVEDILOL PHOSPHATE 6.25 MILLIGRAM(S): 80 CAPSULE, EXTENDED RELEASE ORAL at 20:13

## 2019-04-29 RX ADMIN — AMLODIPINE BESYLATE 10 MILLIGRAM(S): 2.5 TABLET ORAL at 08:58

## 2019-04-29 RX ADMIN — LORATADINE 10 MILLIGRAM(S): 10 TABLET ORAL at 08:07

## 2019-04-29 RX ADMIN — INSULIN GLARGINE 30 UNIT(S): 100 INJECTION, SOLUTION SUBCUTANEOUS at 20:51

## 2019-04-29 RX ADMIN — Medication 1 TABLET(S): at 08:07

## 2019-04-29 RX ADMIN — KETOTIFEN FUMARATE 1 DROP(S): 0.34 SOLUTION OPHTHALMIC at 20:13

## 2019-04-29 RX ADMIN — ATORVASTATIN CALCIUM 20 MILLIGRAM(S): 80 TABLET, FILM COATED ORAL at 20:13

## 2019-04-30 PROCEDURE — 99232 SBSQ HOSP IP/OBS MODERATE 35: CPT

## 2019-04-30 RX ORDER — CLONAZEPAM 1 MG
0.5 TABLET ORAL AT BEDTIME
Qty: 0 | Refills: 0 | Status: DISCONTINUED | OUTPATIENT
Start: 2019-04-30 | End: 2019-05-01

## 2019-04-30 RX ADMIN — KETOTIFEN FUMARATE 1 DROP(S): 0.34 SOLUTION OPHTHALMIC at 09:09

## 2019-04-30 RX ADMIN — LOSARTAN POTASSIUM 100 MILLIGRAM(S): 100 TABLET, FILM COATED ORAL at 09:09

## 2019-04-30 RX ADMIN — Medication 2: at 20:35

## 2019-04-30 RX ADMIN — Medication 4 MILLIGRAM(S): at 08:26

## 2019-04-30 RX ADMIN — Medication 400 MILLIGRAM(S): at 01:37

## 2019-04-30 RX ADMIN — METFORMIN HYDROCHLORIDE 1000 MILLIGRAM(S): 850 TABLET ORAL at 08:26

## 2019-04-30 RX ADMIN — Medication 6: at 12:27

## 2019-04-30 RX ADMIN — ATORVASTATIN CALCIUM 20 MILLIGRAM(S): 80 TABLET, FILM COATED ORAL at 20:35

## 2019-04-30 RX ADMIN — PANTOPRAZOLE SODIUM 40 MILLIGRAM(S): 20 TABLET, DELAYED RELEASE ORAL at 08:26

## 2019-04-30 RX ADMIN — CARVEDILOL PHOSPHATE 6.25 MILLIGRAM(S): 80 CAPSULE, EXTENDED RELEASE ORAL at 20:35

## 2019-04-30 RX ADMIN — Medication 400 MILLIGRAM(S): at 04:23

## 2019-04-30 RX ADMIN — LORATADINE 10 MILLIGRAM(S): 10 TABLET ORAL at 08:26

## 2019-04-30 RX ADMIN — LAMOTRIGINE 75 MILLIGRAM(S): 25 TABLET, ORALLY DISINTEGRATING ORAL at 08:26

## 2019-04-30 RX ADMIN — Medication 2 MILLIGRAM(S): at 01:37

## 2019-04-30 RX ADMIN — INSULIN GLARGINE 30 UNIT(S): 100 INJECTION, SOLUTION SUBCUTANEOUS at 20:35

## 2019-04-30 RX ADMIN — METFORMIN HYDROCHLORIDE 1000 MILLIGRAM(S): 850 TABLET ORAL at 20:36

## 2019-04-30 RX ADMIN — Medication 1 TABLET(S): at 08:26

## 2019-04-30 RX ADMIN — Medication 0.5 MILLIGRAM(S): at 20:35

## 2019-04-30 RX ADMIN — Medication 6: at 17:14

## 2019-04-30 RX ADMIN — VALACYCLOVIR 500 MILLIGRAM(S): 500 TABLET, FILM COATED ORAL at 09:09

## 2019-04-30 RX ADMIN — AMLODIPINE BESYLATE 10 MILLIGRAM(S): 2.5 TABLET ORAL at 09:09

## 2019-04-30 RX ADMIN — CARVEDILOL PHOSPHATE 6.25 MILLIGRAM(S): 80 CAPSULE, EXTENDED RELEASE ORAL at 09:09

## 2019-04-30 RX ADMIN — QUETIAPINE FUMARATE 400 MILLIGRAM(S): 200 TABLET, FILM COATED ORAL at 20:36

## 2019-05-01 VITALS — TEMPERATURE: 98 F

## 2019-05-01 PROCEDURE — 99239 HOSP IP/OBS DSCHRG MGMT >30: CPT

## 2019-05-01 RX ORDER — INSULIN GLARGINE 100 [IU]/ML
30 INJECTION, SOLUTION SUBCUTANEOUS
Qty: 5 | Refills: 0
Start: 2019-05-01 | End: 2019-05-14

## 2019-05-01 RX ORDER — IBUPROFEN 200 MG
1 TABLET ORAL
Qty: 0 | Refills: 0 | DISCHARGE
Start: 2019-05-01

## 2019-05-01 RX ORDER — CLONAZEPAM 1 MG
1 TABLET ORAL
Qty: 15 | Refills: 0
Start: 2019-05-01 | End: 2019-05-15

## 2019-05-01 RX ORDER — BACITRACIN ZINC 500 UNIT/G
1 OINTMENT IN PACKET (EA) TOPICAL
Qty: 0 | Refills: 0 | DISCHARGE
Start: 2019-05-01

## 2019-05-01 RX ORDER — ATORVASTATIN CALCIUM 80 MG/1
1 TABLET, FILM COATED ORAL
Qty: 15 | Refills: 0
Start: 2019-05-01 | End: 2019-05-15

## 2019-05-01 RX ORDER — METFORMIN HYDROCHLORIDE 850 MG/1
1 TABLET ORAL
Qty: 30 | Refills: 0
Start: 2019-05-01 | End: 2019-05-15

## 2019-05-01 RX ORDER — LAMOTRIGINE 25 MG/1
3 TABLET, ORALLY DISINTEGRATING ORAL
Qty: 45 | Refills: 0
Start: 2019-05-01 | End: 2019-05-15

## 2019-05-01 RX ORDER — QUETIAPINE FUMARATE 200 MG/1
1 TABLET, FILM COATED ORAL
Qty: 15 | Refills: 0
Start: 2019-05-01 | End: 2019-05-15

## 2019-05-01 RX ORDER — CARVEDILOL PHOSPHATE 80 MG/1
1 CAPSULE, EXTENDED RELEASE ORAL
Qty: 30 | Refills: 0
Start: 2019-05-01 | End: 2019-05-15

## 2019-05-01 RX ORDER — KETOTIFEN FUMARATE 0.34 MG/ML
1 SOLUTION OPHTHALMIC
Qty: 1 | Refills: 0
Start: 2019-05-01

## 2019-05-01 RX ORDER — LOSARTAN POTASSIUM 100 MG/1
1 TABLET, FILM COATED ORAL
Qty: 15 | Refills: 0
Start: 2019-05-01 | End: 2019-05-15

## 2019-05-01 RX ORDER — GLIMEPIRIDE 1 MG
1 TABLET ORAL
Qty: 15 | Refills: 0
Start: 2019-05-01 | End: 2019-05-15

## 2019-05-01 RX ORDER — GLIMEPIRIDE 1 MG
1 TABLET ORAL
Qty: 0 | Refills: 0 | COMMUNITY

## 2019-05-01 RX ORDER — LORATADINE 10 MG/1
1 TABLET ORAL
Qty: 15 | Refills: 0
Start: 2019-05-01 | End: 2019-05-15

## 2019-05-01 RX ORDER — VALACYCLOVIR 500 MG/1
1 TABLET, FILM COATED ORAL
Qty: 15 | Refills: 0
Start: 2019-05-01 | End: 2019-05-15

## 2019-05-01 RX ORDER — PANTOPRAZOLE SODIUM 20 MG/1
1 TABLET, DELAYED RELEASE ORAL
Qty: 15 | Refills: 0
Start: 2019-05-01 | End: 2019-05-15

## 2019-05-01 RX ORDER — ENOXAPARIN SODIUM 100 MG/ML
30 INJECTION SUBCUTANEOUS
Qty: 5 | Refills: 0 | OUTPATIENT
Start: 2019-05-01 | End: 2019-05-14

## 2019-05-01 RX ORDER — MECLIZINE HCL 12.5 MG
1 TABLET ORAL
Qty: 0 | Refills: 0 | DISCHARGE
Start: 2019-05-01

## 2019-05-01 RX ORDER — ATORVASTATIN CALCIUM 80 MG/1
1 TABLET, FILM COATED ORAL
Qty: 15 | Refills: 0 | OUTPATIENT

## 2019-05-01 RX ORDER — AMLODIPINE BESYLATE 2.5 MG/1
1 TABLET ORAL
Qty: 15 | Refills: 0
Start: 2019-05-01 | End: 2019-05-15

## 2019-05-01 RX ADMIN — AMLODIPINE BESYLATE 10 MILLIGRAM(S): 2.5 TABLET ORAL at 09:20

## 2019-05-01 RX ADMIN — LORATADINE 10 MILLIGRAM(S): 10 TABLET ORAL at 09:20

## 2019-05-01 RX ADMIN — Medication 4 MILLIGRAM(S): at 08:14

## 2019-05-01 RX ADMIN — Medication 4: at 12:33

## 2019-05-01 RX ADMIN — VALACYCLOVIR 500 MILLIGRAM(S): 500 TABLET, FILM COATED ORAL at 09:20

## 2019-05-01 RX ADMIN — CARVEDILOL PHOSPHATE 6.25 MILLIGRAM(S): 80 CAPSULE, EXTENDED RELEASE ORAL at 09:20

## 2019-05-01 RX ADMIN — METFORMIN HYDROCHLORIDE 1000 MILLIGRAM(S): 850 TABLET ORAL at 09:20

## 2019-05-01 RX ADMIN — Medication 1 TABLET(S): at 09:20

## 2019-05-01 RX ADMIN — Medication 400 MILLIGRAM(S): at 12:30

## 2019-05-01 RX ADMIN — LOSARTAN POTASSIUM 100 MILLIGRAM(S): 100 TABLET, FILM COATED ORAL at 09:20

## 2019-05-01 RX ADMIN — PANTOPRAZOLE SODIUM 40 MILLIGRAM(S): 20 TABLET, DELAYED RELEASE ORAL at 08:14

## 2019-05-01 RX ADMIN — LAMOTRIGINE 75 MILLIGRAM(S): 25 TABLET, ORALLY DISINTEGRATING ORAL at 09:20

## 2019-05-01 RX ADMIN — KETOTIFEN FUMARATE 1 DROP(S): 0.34 SOLUTION OPHTHALMIC at 09:20

## 2019-05-01 RX ADMIN — Medication 2: at 07:55

## 2019-05-09 ENCOUNTER — INPATIENT (INPATIENT)
Facility: HOSPITAL | Age: 59
LOS: 7 days | Discharge: HOME | End: 2019-05-17
Attending: PSYCHIATRY & NEUROLOGY | Admitting: PSYCHIATRY & NEUROLOGY
Payer: MEDICAID

## 2019-05-09 VITALS — TEMPERATURE: 96 F

## 2019-05-09 DIAGNOSIS — F31.9 BIPOLAR DISORDER, UNSPECIFIED: ICD-10-CM

## 2019-05-09 LAB — GLUCOSE BLDC GLUCOMTR-MCNC: 220 MG/DL — HIGH (ref 70–99)

## 2019-05-09 PROCEDURE — 99222 1ST HOSP IP/OBS MODERATE 55: CPT

## 2019-05-09 RX ORDER — DEXTROSE 50 % IN WATER 50 %
15 SYRINGE (ML) INTRAVENOUS ONCE
Refills: 0 | Status: DISCONTINUED | OUTPATIENT
Start: 2019-05-09 | End: 2019-05-10

## 2019-05-09 RX ORDER — AMLODIPINE BESYLATE 2.5 MG/1
10 TABLET ORAL DAILY
Refills: 0 | Status: DISCONTINUED | OUTPATIENT
Start: 2019-05-09 | End: 2019-05-17

## 2019-05-09 RX ORDER — DIPHENHYDRAMINE HCL 50 MG
50 CAPSULE ORAL EVERY 6 HOURS
Refills: 0 | Status: DISCONTINUED | OUTPATIENT
Start: 2019-05-09 | End: 2019-05-17

## 2019-05-09 RX ORDER — HALOPERIDOL DECANOATE 100 MG/ML
5 INJECTION INTRAMUSCULAR ONCE
Refills: 0 | Status: DISCONTINUED | OUTPATIENT
Start: 2019-05-09 | End: 2019-05-17

## 2019-05-09 RX ORDER — DEXTROSE 50 % IN WATER 50 %
12.5 SYRINGE (ML) INTRAVENOUS ONCE
Refills: 0 | Status: DISCONTINUED | OUTPATIENT
Start: 2019-05-09 | End: 2019-05-10

## 2019-05-09 RX ORDER — INSULIN GLARGINE 100 [IU]/ML
30 INJECTION, SOLUTION SUBCUTANEOUS EVERY MORNING
Refills: 0 | Status: DISCONTINUED | OUTPATIENT
Start: 2019-05-10 | End: 2019-05-10

## 2019-05-09 RX ORDER — INSULIN LISPRO 100/ML
VIAL (ML) SUBCUTANEOUS
Refills: 0 | Status: DISCONTINUED | OUTPATIENT
Start: 2019-05-09 | End: 2019-05-09

## 2019-05-09 RX ORDER — DEXTROSE 50 % IN WATER 50 %
25 SYRINGE (ML) INTRAVENOUS ONCE
Refills: 0 | Status: DISCONTINUED | OUTPATIENT
Start: 2019-05-09 | End: 2019-05-10

## 2019-05-09 RX ORDER — MECLIZINE HCL 12.5 MG
25 TABLET ORAL
Refills: 0 | Status: DISCONTINUED | OUTPATIENT
Start: 2019-05-09 | End: 2019-05-17

## 2019-05-09 RX ORDER — LORATADINE 10 MG/1
10 TABLET ORAL DAILY
Refills: 0 | Status: DISCONTINUED | OUTPATIENT
Start: 2019-05-09 | End: 2019-05-17

## 2019-05-09 RX ORDER — HALOPERIDOL DECANOATE 100 MG/ML
5 INJECTION INTRAMUSCULAR EVERY 6 HOURS
Refills: 0 | Status: DISCONTINUED | OUTPATIENT
Start: 2019-05-09 | End: 2019-05-17

## 2019-05-09 RX ORDER — CLONAZEPAM 1 MG
0.5 TABLET ORAL AT BEDTIME
Refills: 0 | Status: DISCONTINUED | OUTPATIENT
Start: 2019-05-09 | End: 2019-05-13

## 2019-05-09 RX ORDER — PANTOPRAZOLE SODIUM 20 MG/1
40 TABLET, DELAYED RELEASE ORAL
Refills: 0 | Status: DISCONTINUED | OUTPATIENT
Start: 2019-05-09 | End: 2019-05-17

## 2019-05-09 RX ORDER — VALACYCLOVIR 500 MG/1
500 TABLET, FILM COATED ORAL DAILY
Refills: 0 | Status: DISCONTINUED | OUTPATIENT
Start: 2019-05-09 | End: 2019-05-17

## 2019-05-09 RX ORDER — LAMOTRIGINE 25 MG/1
75 TABLET, ORALLY DISINTEGRATING ORAL DAILY
Refills: 0 | Status: DISCONTINUED | OUTPATIENT
Start: 2019-05-09 | End: 2019-05-10

## 2019-05-09 RX ORDER — GLIMEPIRIDE 1 MG
4 TABLET ORAL
Refills: 0 | Status: DISCONTINUED | OUTPATIENT
Start: 2019-05-09 | End: 2019-05-17

## 2019-05-09 RX ORDER — CARVEDILOL PHOSPHATE 80 MG/1
6.25 CAPSULE, EXTENDED RELEASE ORAL EVERY 12 HOURS
Refills: 0 | Status: DISCONTINUED | OUTPATIENT
Start: 2019-05-09 | End: 2019-05-17

## 2019-05-09 RX ORDER — SODIUM CHLORIDE 9 MG/ML
1000 INJECTION, SOLUTION INTRAVENOUS
Refills: 0 | Status: DISCONTINUED | OUTPATIENT
Start: 2019-05-09 | End: 2019-05-10

## 2019-05-09 RX ORDER — ATORVASTATIN CALCIUM 80 MG/1
20 TABLET, FILM COATED ORAL AT BEDTIME
Refills: 0 | Status: DISCONTINUED | OUTPATIENT
Start: 2019-05-09 | End: 2019-05-17

## 2019-05-09 RX ORDER — LOSARTAN POTASSIUM 100 MG/1
100 TABLET, FILM COATED ORAL DAILY
Refills: 0 | Status: DISCONTINUED | OUTPATIENT
Start: 2019-05-09 | End: 2019-05-17

## 2019-05-09 RX ORDER — GLUCAGON INJECTION, SOLUTION 0.5 MG/.1ML
1 INJECTION, SOLUTION SUBCUTANEOUS ONCE
Refills: 0 | Status: DISCONTINUED | OUTPATIENT
Start: 2019-05-09 | End: 2019-05-17

## 2019-05-09 RX ORDER — QUETIAPINE FUMARATE 200 MG/1
400 TABLET, FILM COATED ORAL AT BEDTIME
Refills: 0 | Status: DISCONTINUED | OUTPATIENT
Start: 2019-05-09 | End: 2019-05-17

## 2019-05-09 RX ORDER — INSULIN LISPRO 100/ML
VIAL (ML) SUBCUTANEOUS
Refills: 0 | Status: DISCONTINUED | OUTPATIENT
Start: 2019-05-09 | End: 2019-05-10

## 2019-05-09 RX ORDER — INSULIN LISPRO 100/ML
15 VIAL (ML) SUBCUTANEOUS
Refills: 0 | Status: DISCONTINUED | OUTPATIENT
Start: 2019-05-09 | End: 2019-05-09

## 2019-05-09 RX ADMIN — Medication 0.5 MILLIGRAM(S): at 21:07

## 2019-05-09 RX ADMIN — CARVEDILOL PHOSPHATE 6.25 MILLIGRAM(S): 80 CAPSULE, EXTENDED RELEASE ORAL at 21:23

## 2019-05-09 RX ADMIN — QUETIAPINE FUMARATE 400 MILLIGRAM(S): 200 TABLET, FILM COATED ORAL at 21:23

## 2019-05-09 RX ADMIN — ATORVASTATIN CALCIUM 20 MILLIGRAM(S): 80 TABLET, FILM COATED ORAL at 21:09

## 2019-05-09 NOTE — CHART NOTE - NSCHARTNOTEFT_GEN_A_CORE
Screening Medical Evaluation  Patient Admitted from: [facility] admitting diagnosis: Bipolar affective disorder    PAST MEDICAL & SURGICAL HISTORY:  Bipolar mood disorder  Osteoarthritis  HTN (hypertension)  DM (diabetes mellitus)  Depression  No significant past surgical history        Allergies    No Known Allergies    Intolerances        Social History:     FAMILY HISTORY:  No pertinent family history in first degree relatives      MEDICATIONS  (STANDING):  amLODIPine   Tablet 10 milliGRAM(s) Oral daily  atorvastatin 20 milliGRAM(s) Oral at bedtime  carvedilol 6.25 milliGRAM(s) Oral every 12 hours  clonazePAM  Tablet 0.5 milliGRAM(s) Oral at bedtime  dextrose 5%. 1000 milliLiter(s) (50 mL/Hr) IV Continuous <Continuous>  dextrose 50% Injectable 12.5 Gram(s) IV Push once  dextrose 50% Injectable 25 Gram(s) IV Push once  dextrose 50% Injectable 25 Gram(s) IV Push once  glimepiride. 4 milliGRAM(s) Oral with breakfast  insulin lispro (HumaLOG) corrective regimen sliding scale   SubCutaneous three times a day before meals  lamoTRIgine 75 milliGRAM(s) Oral daily  loratadine 10 milliGRAM(s) Oral daily  losartan 100 milliGRAM(s) Oral daily  pantoprazole    Tablet 40 milliGRAM(s) Oral before breakfast  QUEtiapine 400 milliGRAM(s) Oral at bedtime  valACYclovir 500 milliGRAM(s) Oral daily    MEDICATIONS  (PRN):  aluminum hydroxide/magnesium hydroxide/simethicone Suspension 30 milliLiter(s) Oral every 6 hours PRN Dyspepsia  dextrose 40% Gel 15 Gram(s) Oral once PRN Blood Glucose LESS THAN 70 milliGRAM(s)/deciliter  diphenhydrAMINE 50 milliGRAM(s) Oral every 6 hours PRN Extrapyramidal prophylaxis  diphenhydrAMINE   Injectable 50 milliGRAM(s) IntraMuscular every 6 hours PRN eps  glucagon  Injectable 1 milliGRAM(s) IntraMuscular once PRN Glucose LESS THAN 70 milligrams/deciliter  haloperidol     Tablet 5 milliGRAM(s) Oral every 6 hours PRN agitation  haloperidol    Injectable 5 milliGRAM(s) IntraMuscular once PRN agitation  LORazepam     Tablet 2 milliGRAM(s) Oral every 6 hours PRN Agitation  LORazepam   Injectable 2 milliGRAM(s) IntraMuscular once PRN Agitation  meclizine 25 milliGRAM(s) Oral four times a day PRN Dizziness      Vital Signs Last 24 Hrs  T(C): 35.8 (09 May 2019 21:21), Max: 35.8 (09 May 2019 21:21)  T(F): 96.5 (09 May 2019 21:21), Max: 96.5 (09 May 2019 21:21)  HR: 107  BP: 151/95  RR: 18  SpO2: --  CAPILLARY BLOOD GLUCOSE      POCT Blood Glucose.: 220 mg/dL (09 May 2019 20:05)        PHYSICAL EXAM:  GENERAL: NAD, well-developed  HEAD:  Atraumatic, Normocephalic  EYES: EOMI, PERRLA, conjunctiva and sclera clear  NECK: Supple.  CHEST/LUNG: Clear to auscultation bilaterally; No wheeze  HEART: Regular rate and rhythm; No murmurs, rubs, or gallops  ABDOMEN: Soft, Nontender, Nondistended; Bowel sounds present  EXTREMITIES:  2+ Peripheral Pulses, No clubbing, cyanosis, or edema  PSYCH: AAOx3  NEUROLOGY: non-focal  SKIN: No rashes or lesions    LABS:                    RADIOLOGY & ADDITIONAL TESTS:    Assessment and Plan:  59 year old female presenting today from [facility] to [facility] with admitting diagnosis of Bipolar affective disorder with PMH of CVA 3 months ago, HTN, HLD, DM, sleep apnea, and genital herpes. Denies any fever, chills, headache, chest pain, SOB, abdominal pain, N/V/D/C, dysuria.  1) Bipolar affective disorder: follow care plan as per primary psych team.   2) Herpes: continue valtrex 500mg daily.  3) DM: Continue Lantus 30 units every morning, glimepiride 4mg oral with breakfast, and Humalog ISS during meals.  4) HTN: Continue Norvasc 10mg oral daily, and Cozaar 100mg oral daily.  5) HLD: Continue Lipitor 20 mg at bedtime.  6) Allergies: Continue Claritin 10mg oral daily.  7) Dizziness: continue Meclizine 25mg oral QID prn for dizziness.  8) ERIK: Continue overnight CPAP.

## 2019-05-10 LAB
CHOLEST SERPL-MCNC: 176 MG/DL — SIGNIFICANT CHANGE UP (ref 120–199)
GLUCOSE BLDC GLUCOMTR-MCNC: 194 MG/DL — HIGH (ref 70–99)
GLUCOSE BLDC GLUCOMTR-MCNC: 247 MG/DL — HIGH (ref 70–99)
GLUCOSE BLDC GLUCOMTR-MCNC: 271 MG/DL — HIGH (ref 70–99)
GLUCOSE BLDC GLUCOMTR-MCNC: 300 MG/DL — HIGH (ref 70–99)
HDLC SERPL-MCNC: 64 MG/DL — SIGNIFICANT CHANGE UP (ref 45–65)
LIPID PNL WITH DIRECT LDL SERPL: 99 MG/DL — SIGNIFICANT CHANGE UP
TRIGL SERPL-MCNC: 157 MG/DL — HIGH (ref 10–149)

## 2019-05-10 PROCEDURE — 90853 GROUP PSYCHOTHERAPY: CPT

## 2019-05-10 PROCEDURE — 99232 SBSQ HOSP IP/OBS MODERATE 35: CPT

## 2019-05-10 RX ORDER — INSULIN LISPRO 100/ML
VIAL (ML) SUBCUTANEOUS AT BEDTIME
Refills: 0 | Status: DISCONTINUED | OUTPATIENT
Start: 2019-05-10 | End: 2019-05-17

## 2019-05-10 RX ORDER — IBUPROFEN 200 MG
400 TABLET ORAL EVERY 6 HOURS
Refills: 0 | Status: DISCONTINUED | OUTPATIENT
Start: 2019-05-10 | End: 2019-05-17

## 2019-05-10 RX ORDER — LAMOTRIGINE 25 MG/1
100 TABLET, ORALLY DISINTEGRATING ORAL DAILY
Refills: 0 | Status: DISCONTINUED | OUTPATIENT
Start: 2019-05-10 | End: 2019-05-17

## 2019-05-10 RX ORDER — INSULIN GLARGINE 100 [IU]/ML
30 INJECTION, SOLUTION SUBCUTANEOUS AT BEDTIME
Refills: 0 | Status: DISCONTINUED | OUTPATIENT
Start: 2019-05-10 | End: 2019-05-17

## 2019-05-10 RX ORDER — INSULIN LISPRO 100/ML
VIAL (ML) SUBCUTANEOUS
Refills: 0 | Status: DISCONTINUED | OUTPATIENT
Start: 2019-05-10 | End: 2019-05-17

## 2019-05-10 RX ORDER — METFORMIN HYDROCHLORIDE 850 MG/1
1000 TABLET ORAL
Refills: 0 | Status: DISCONTINUED | OUTPATIENT
Start: 2019-05-10 | End: 2019-05-17

## 2019-05-10 RX ADMIN — ATORVASTATIN CALCIUM 20 MILLIGRAM(S): 80 TABLET, FILM COATED ORAL at 20:17

## 2019-05-10 RX ADMIN — CARVEDILOL PHOSPHATE 6.25 MILLIGRAM(S): 80 CAPSULE, EXTENDED RELEASE ORAL at 20:17

## 2019-05-10 RX ADMIN — Medication 400 MILLIGRAM(S): at 15:08

## 2019-05-10 RX ADMIN — Medication 0.5 MILLIGRAM(S): at 20:18

## 2019-05-10 RX ADMIN — CARVEDILOL PHOSPHATE 6.25 MILLIGRAM(S): 80 CAPSULE, EXTENDED RELEASE ORAL at 08:35

## 2019-05-10 RX ADMIN — VALACYCLOVIR 500 MILLIGRAM(S): 500 TABLET, FILM COATED ORAL at 08:34

## 2019-05-10 RX ADMIN — Medication 1: at 08:34

## 2019-05-10 RX ADMIN — Medication 4: at 16:51

## 2019-05-10 RX ADMIN — Medication 2: at 21:00

## 2019-05-10 RX ADMIN — INSULIN GLARGINE 30 UNIT(S): 100 INJECTION, SOLUTION SUBCUTANEOUS at 21:00

## 2019-05-10 RX ADMIN — PANTOPRAZOLE SODIUM 40 MILLIGRAM(S): 20 TABLET, DELAYED RELEASE ORAL at 08:34

## 2019-05-10 RX ADMIN — INSULIN GLARGINE 30 UNIT(S): 100 INJECTION, SOLUTION SUBCUTANEOUS at 08:35

## 2019-05-10 RX ADMIN — Medication 4 MILLIGRAM(S): at 08:35

## 2019-05-10 RX ADMIN — Medication 3: at 11:55

## 2019-05-10 RX ADMIN — LOSARTAN POTASSIUM 100 MILLIGRAM(S): 100 TABLET, FILM COATED ORAL at 08:34

## 2019-05-10 RX ADMIN — Medication 400 MILLIGRAM(S): at 16:19

## 2019-05-10 RX ADMIN — LORATADINE 10 MILLIGRAM(S): 10 TABLET ORAL at 08:34

## 2019-05-10 RX ADMIN — METFORMIN HYDROCHLORIDE 1000 MILLIGRAM(S): 850 TABLET ORAL at 20:18

## 2019-05-10 RX ADMIN — AMLODIPINE BESYLATE 10 MILLIGRAM(S): 2.5 TABLET ORAL at 08:35

## 2019-05-10 RX ADMIN — LAMOTRIGINE 75 MILLIGRAM(S): 25 TABLET, ORALLY DISINTEGRATING ORAL at 08:34

## 2019-05-10 RX ADMIN — QUETIAPINE FUMARATE 400 MILLIGRAM(S): 200 TABLET, FILM COATED ORAL at 20:18

## 2019-05-11 LAB
GLUCOSE BLDC GLUCOMTR-MCNC: 180 MG/DL — HIGH (ref 70–99)
GLUCOSE BLDC GLUCOMTR-MCNC: 227 MG/DL — HIGH (ref 70–99)
GLUCOSE BLDC GLUCOMTR-MCNC: 227 MG/DL — HIGH (ref 70–99)
GLUCOSE BLDC GLUCOMTR-MCNC: 252 MG/DL — HIGH (ref 70–99)

## 2019-05-11 PROCEDURE — 99232 SBSQ HOSP IP/OBS MODERATE 35: CPT

## 2019-05-11 RX ADMIN — LAMOTRIGINE 100 MILLIGRAM(S): 25 TABLET, ORALLY DISINTEGRATING ORAL at 09:42

## 2019-05-11 RX ADMIN — CARVEDILOL PHOSPHATE 6.25 MILLIGRAM(S): 80 CAPSULE, EXTENDED RELEASE ORAL at 09:40

## 2019-05-11 RX ADMIN — Medication 50 MILLIGRAM(S): at 09:11

## 2019-05-11 RX ADMIN — Medication 50 MILLIGRAM(S): at 16:12

## 2019-05-11 RX ADMIN — METFORMIN HYDROCHLORIDE 1000 MILLIGRAM(S): 850 TABLET ORAL at 20:37

## 2019-05-11 RX ADMIN — LORATADINE 10 MILLIGRAM(S): 10 TABLET ORAL at 09:41

## 2019-05-11 RX ADMIN — Medication 2: at 07:45

## 2019-05-11 RX ADMIN — Medication 400 MILLIGRAM(S): at 09:41

## 2019-05-11 RX ADMIN — INSULIN GLARGINE 30 UNIT(S): 100 INJECTION, SOLUTION SUBCUTANEOUS at 20:36

## 2019-05-11 RX ADMIN — Medication 400 MILLIGRAM(S): at 09:11

## 2019-05-11 RX ADMIN — METFORMIN HYDROCHLORIDE 1000 MILLIGRAM(S): 850 TABLET ORAL at 09:41

## 2019-05-11 RX ADMIN — Medication 4: at 16:37

## 2019-05-11 RX ADMIN — PANTOPRAZOLE SODIUM 40 MILLIGRAM(S): 20 TABLET, DELAYED RELEASE ORAL at 09:41

## 2019-05-11 RX ADMIN — LOSARTAN POTASSIUM 100 MILLIGRAM(S): 100 TABLET, FILM COATED ORAL at 09:41

## 2019-05-11 RX ADMIN — Medication 4: at 11:48

## 2019-05-11 RX ADMIN — QUETIAPINE FUMARATE 400 MILLIGRAM(S): 200 TABLET, FILM COATED ORAL at 20:37

## 2019-05-11 RX ADMIN — VALACYCLOVIR 500 MILLIGRAM(S): 500 TABLET, FILM COATED ORAL at 09:41

## 2019-05-11 RX ADMIN — Medication 4 MILLIGRAM(S): at 09:40

## 2019-05-11 RX ADMIN — ATORVASTATIN CALCIUM 20 MILLIGRAM(S): 80 TABLET, FILM COATED ORAL at 20:37

## 2019-05-11 RX ADMIN — CARVEDILOL PHOSPHATE 6.25 MILLIGRAM(S): 80 CAPSULE, EXTENDED RELEASE ORAL at 20:37

## 2019-05-11 RX ADMIN — AMLODIPINE BESYLATE 10 MILLIGRAM(S): 2.5 TABLET ORAL at 09:40

## 2019-05-11 RX ADMIN — Medication 2: at 20:32

## 2019-05-11 RX ADMIN — Medication 0.5 MILLIGRAM(S): at 20:37

## 2019-05-12 LAB
GLUCOSE BLDC GLUCOMTR-MCNC: 149 MG/DL — HIGH (ref 70–99)
GLUCOSE BLDC GLUCOMTR-MCNC: 239 MG/DL — HIGH (ref 70–99)
GLUCOSE BLDC GLUCOMTR-MCNC: 248 MG/DL — HIGH (ref 70–99)
GLUCOSE BLDC GLUCOMTR-MCNC: 274 MG/DL — HIGH (ref 70–99)

## 2019-05-12 RX ADMIN — Medication 2 MILLIGRAM(S): at 02:40

## 2019-05-12 RX ADMIN — LAMOTRIGINE 100 MILLIGRAM(S): 25 TABLET, ORALLY DISINTEGRATING ORAL at 09:15

## 2019-05-12 RX ADMIN — CARVEDILOL PHOSPHATE 6.25 MILLIGRAM(S): 80 CAPSULE, EXTENDED RELEASE ORAL at 09:15

## 2019-05-12 RX ADMIN — LORATADINE 10 MILLIGRAM(S): 10 TABLET ORAL at 09:15

## 2019-05-12 RX ADMIN — Medication 0.5 MILLIGRAM(S): at 20:24

## 2019-05-12 RX ADMIN — PANTOPRAZOLE SODIUM 40 MILLIGRAM(S): 20 TABLET, DELAYED RELEASE ORAL at 09:15

## 2019-05-12 RX ADMIN — Medication 4: at 12:13

## 2019-05-12 RX ADMIN — INSULIN GLARGINE 30 UNIT(S): 100 INJECTION, SOLUTION SUBCUTANEOUS at 20:24

## 2019-05-12 RX ADMIN — METFORMIN HYDROCHLORIDE 1000 MILLIGRAM(S): 850 TABLET ORAL at 09:15

## 2019-05-12 RX ADMIN — VALACYCLOVIR 500 MILLIGRAM(S): 500 TABLET, FILM COATED ORAL at 09:15

## 2019-05-12 RX ADMIN — Medication 4: at 16:56

## 2019-05-12 RX ADMIN — ATORVASTATIN CALCIUM 20 MILLIGRAM(S): 80 TABLET, FILM COATED ORAL at 20:24

## 2019-05-12 RX ADMIN — LOSARTAN POTASSIUM 100 MILLIGRAM(S): 100 TABLET, FILM COATED ORAL at 09:15

## 2019-05-12 RX ADMIN — METFORMIN HYDROCHLORIDE 1000 MILLIGRAM(S): 850 TABLET ORAL at 20:25

## 2019-05-12 RX ADMIN — QUETIAPINE FUMARATE 400 MILLIGRAM(S): 200 TABLET, FILM COATED ORAL at 20:25

## 2019-05-12 RX ADMIN — Medication 4 MILLIGRAM(S): at 09:15

## 2019-05-12 RX ADMIN — CARVEDILOL PHOSPHATE 6.25 MILLIGRAM(S): 80 CAPSULE, EXTENDED RELEASE ORAL at 20:24

## 2019-05-12 RX ADMIN — AMLODIPINE BESYLATE 10 MILLIGRAM(S): 2.5 TABLET ORAL at 09:15

## 2019-05-12 RX ADMIN — Medication 2: at 20:24

## 2019-05-13 LAB
GLUCOSE BLDC GLUCOMTR-MCNC: 161 MG/DL — HIGH (ref 70–99)
GLUCOSE BLDC GLUCOMTR-MCNC: 170 MG/DL — HIGH (ref 70–99)
GLUCOSE BLDC GLUCOMTR-MCNC: 189 MG/DL — HIGH (ref 70–99)
GLUCOSE BLDC GLUCOMTR-MCNC: 264 MG/DL — HIGH (ref 70–99)

## 2019-05-13 PROCEDURE — 73110 X-RAY EXAM OF WRIST: CPT | Mod: 26,RT

## 2019-05-13 PROCEDURE — 99232 SBSQ HOSP IP/OBS MODERATE 35: CPT

## 2019-05-13 PROCEDURE — 90853 GROUP PSYCHOTHERAPY: CPT

## 2019-05-13 RX ORDER — CLONAZEPAM 1 MG
0.5 TABLET ORAL AT BEDTIME
Refills: 0 | Status: DISCONTINUED | OUTPATIENT
Start: 2019-05-13 | End: 2019-05-17

## 2019-05-13 RX ADMIN — Medication 2: at 11:57

## 2019-05-13 RX ADMIN — QUETIAPINE FUMARATE 400 MILLIGRAM(S): 200 TABLET, FILM COATED ORAL at 20:52

## 2019-05-13 RX ADMIN — INSULIN GLARGINE 30 UNIT(S): 100 INJECTION, SOLUTION SUBCUTANEOUS at 20:50

## 2019-05-13 RX ADMIN — AMLODIPINE BESYLATE 10 MILLIGRAM(S): 2.5 TABLET ORAL at 09:10

## 2019-05-13 RX ADMIN — PANTOPRAZOLE SODIUM 40 MILLIGRAM(S): 20 TABLET, DELAYED RELEASE ORAL at 08:50

## 2019-05-13 RX ADMIN — CARVEDILOL PHOSPHATE 6.25 MILLIGRAM(S): 80 CAPSULE, EXTENDED RELEASE ORAL at 09:10

## 2019-05-13 RX ADMIN — METFORMIN HYDROCHLORIDE 1000 MILLIGRAM(S): 850 TABLET ORAL at 08:50

## 2019-05-13 RX ADMIN — Medication 0.5 MILLIGRAM(S): at 20:50

## 2019-05-13 RX ADMIN — Medication 2: at 08:23

## 2019-05-13 RX ADMIN — LOSARTAN POTASSIUM 100 MILLIGRAM(S): 100 TABLET, FILM COATED ORAL at 09:10

## 2019-05-13 RX ADMIN — CARVEDILOL PHOSPHATE 6.25 MILLIGRAM(S): 80 CAPSULE, EXTENDED RELEASE ORAL at 20:50

## 2019-05-13 RX ADMIN — LAMOTRIGINE 100 MILLIGRAM(S): 25 TABLET, ORALLY DISINTEGRATING ORAL at 08:50

## 2019-05-13 RX ADMIN — Medication 50 MILLIGRAM(S): at 14:04

## 2019-05-13 RX ADMIN — METFORMIN HYDROCHLORIDE 1000 MILLIGRAM(S): 850 TABLET ORAL at 20:52

## 2019-05-13 RX ADMIN — Medication 4 MILLIGRAM(S): at 08:50

## 2019-05-13 RX ADMIN — ATORVASTATIN CALCIUM 20 MILLIGRAM(S): 80 TABLET, FILM COATED ORAL at 20:50

## 2019-05-13 RX ADMIN — LORATADINE 10 MILLIGRAM(S): 10 TABLET ORAL at 08:50

## 2019-05-13 RX ADMIN — VALACYCLOVIR 500 MILLIGRAM(S): 500 TABLET, FILM COATED ORAL at 08:50

## 2019-05-13 RX ADMIN — Medication 6: at 16:59

## 2019-05-14 LAB
GLUCOSE BLDC GLUCOMTR-MCNC: 139 MG/DL — HIGH (ref 70–99)
GLUCOSE BLDC GLUCOMTR-MCNC: 182 MG/DL — HIGH (ref 70–99)
GLUCOSE BLDC GLUCOMTR-MCNC: 223 MG/DL — HIGH (ref 70–99)
GLUCOSE BLDC GLUCOMTR-MCNC: 311 MG/DL — HIGH (ref 70–99)

## 2019-05-14 PROCEDURE — 99232 SBSQ HOSP IP/OBS MODERATE 35: CPT

## 2019-05-14 RX ADMIN — CARVEDILOL PHOSPHATE 6.25 MILLIGRAM(S): 80 CAPSULE, EXTENDED RELEASE ORAL at 08:13

## 2019-05-14 RX ADMIN — LORATADINE 10 MILLIGRAM(S): 10 TABLET ORAL at 08:12

## 2019-05-14 RX ADMIN — ATORVASTATIN CALCIUM 20 MILLIGRAM(S): 80 TABLET, FILM COATED ORAL at 20:40

## 2019-05-14 RX ADMIN — Medication 4: at 20:42

## 2019-05-14 RX ADMIN — METFORMIN HYDROCHLORIDE 1000 MILLIGRAM(S): 850 TABLET ORAL at 08:12

## 2019-05-14 RX ADMIN — Medication 4: at 16:40

## 2019-05-14 RX ADMIN — VALACYCLOVIR 500 MILLIGRAM(S): 500 TABLET, FILM COATED ORAL at 08:12

## 2019-05-14 RX ADMIN — AMLODIPINE BESYLATE 10 MILLIGRAM(S): 2.5 TABLET ORAL at 08:13

## 2019-05-14 RX ADMIN — CARVEDILOL PHOSPHATE 6.25 MILLIGRAM(S): 80 CAPSULE, EXTENDED RELEASE ORAL at 20:40

## 2019-05-14 RX ADMIN — LAMOTRIGINE 100 MILLIGRAM(S): 25 TABLET, ORALLY DISINTEGRATING ORAL at 08:12

## 2019-05-14 RX ADMIN — QUETIAPINE FUMARATE 400 MILLIGRAM(S): 200 TABLET, FILM COATED ORAL at 20:40

## 2019-05-14 RX ADMIN — METFORMIN HYDROCHLORIDE 1000 MILLIGRAM(S): 850 TABLET ORAL at 20:40

## 2019-05-14 RX ADMIN — PANTOPRAZOLE SODIUM 40 MILLIGRAM(S): 20 TABLET, DELAYED RELEASE ORAL at 08:12

## 2019-05-14 RX ADMIN — INSULIN GLARGINE 30 UNIT(S): 100 INJECTION, SOLUTION SUBCUTANEOUS at 20:35

## 2019-05-14 RX ADMIN — LOSARTAN POTASSIUM 100 MILLIGRAM(S): 100 TABLET, FILM COATED ORAL at 08:12

## 2019-05-14 RX ADMIN — Medication 0.5 MILLIGRAM(S): at 20:40

## 2019-05-14 RX ADMIN — Medication 4 MILLIGRAM(S): at 08:13

## 2019-05-14 RX ADMIN — Medication 2: at 12:23

## 2019-05-15 LAB
GLUCOSE BLDC GLUCOMTR-MCNC: 137 MG/DL — HIGH (ref 70–99)
GLUCOSE BLDC GLUCOMTR-MCNC: 163 MG/DL — HIGH (ref 70–99)
GLUCOSE BLDC GLUCOMTR-MCNC: 179 MG/DL — HIGH (ref 70–99)
GLUCOSE BLDC GLUCOMTR-MCNC: 236 MG/DL — HIGH (ref 70–99)

## 2019-05-15 PROCEDURE — 99232 SBSQ HOSP IP/OBS MODERATE 35: CPT

## 2019-05-15 RX ADMIN — Medication 4: at 17:09

## 2019-05-15 RX ADMIN — QUETIAPINE FUMARATE 400 MILLIGRAM(S): 200 TABLET, FILM COATED ORAL at 20:50

## 2019-05-15 RX ADMIN — CARVEDILOL PHOSPHATE 6.25 MILLIGRAM(S): 80 CAPSULE, EXTENDED RELEASE ORAL at 08:47

## 2019-05-15 RX ADMIN — VALACYCLOVIR 500 MILLIGRAM(S): 500 TABLET, FILM COATED ORAL at 08:47

## 2019-05-15 RX ADMIN — LORATADINE 10 MILLIGRAM(S): 10 TABLET ORAL at 08:47

## 2019-05-15 RX ADMIN — CARVEDILOL PHOSPHATE 6.25 MILLIGRAM(S): 80 CAPSULE, EXTENDED RELEASE ORAL at 20:49

## 2019-05-15 RX ADMIN — AMLODIPINE BESYLATE 10 MILLIGRAM(S): 2.5 TABLET ORAL at 08:47

## 2019-05-15 RX ADMIN — METFORMIN HYDROCHLORIDE 1000 MILLIGRAM(S): 850 TABLET ORAL at 08:47

## 2019-05-15 RX ADMIN — METFORMIN HYDROCHLORIDE 1000 MILLIGRAM(S): 850 TABLET ORAL at 20:50

## 2019-05-15 RX ADMIN — LOSARTAN POTASSIUM 100 MILLIGRAM(S): 100 TABLET, FILM COATED ORAL at 08:47

## 2019-05-15 RX ADMIN — Medication 2: at 12:08

## 2019-05-15 RX ADMIN — Medication 4 MILLIGRAM(S): at 08:47

## 2019-05-15 RX ADMIN — LAMOTRIGINE 100 MILLIGRAM(S): 25 TABLET, ORALLY DISINTEGRATING ORAL at 08:47

## 2019-05-15 RX ADMIN — Medication 0.5 MILLIGRAM(S): at 20:49

## 2019-05-15 RX ADMIN — Medication 2: at 07:50

## 2019-05-15 RX ADMIN — ATORVASTATIN CALCIUM 20 MILLIGRAM(S): 80 TABLET, FILM COATED ORAL at 20:49

## 2019-05-15 RX ADMIN — PANTOPRAZOLE SODIUM 40 MILLIGRAM(S): 20 TABLET, DELAYED RELEASE ORAL at 08:47

## 2019-05-15 RX ADMIN — INSULIN GLARGINE 30 UNIT(S): 100 INJECTION, SOLUTION SUBCUTANEOUS at 20:49

## 2019-05-16 LAB
GLUCOSE BLDC GLUCOMTR-MCNC: 148 MG/DL — HIGH (ref 70–99)
GLUCOSE BLDC GLUCOMTR-MCNC: 185 MG/DL — HIGH (ref 70–99)
GLUCOSE BLDC GLUCOMTR-MCNC: 225 MG/DL — HIGH (ref 70–99)
GLUCOSE BLDC GLUCOMTR-MCNC: 236 MG/DL — HIGH (ref 70–99)

## 2019-05-16 PROCEDURE — 99232 SBSQ HOSP IP/OBS MODERATE 35: CPT

## 2019-05-16 RX ADMIN — CARVEDILOL PHOSPHATE 6.25 MILLIGRAM(S): 80 CAPSULE, EXTENDED RELEASE ORAL at 08:06

## 2019-05-16 RX ADMIN — QUETIAPINE FUMARATE 400 MILLIGRAM(S): 200 TABLET, FILM COATED ORAL at 20:09

## 2019-05-16 RX ADMIN — Medication 4: at 11:26

## 2019-05-16 RX ADMIN — Medication 4: at 17:05

## 2019-05-16 RX ADMIN — LOSARTAN POTASSIUM 100 MILLIGRAM(S): 100 TABLET, FILM COATED ORAL at 08:06

## 2019-05-16 RX ADMIN — CARVEDILOL PHOSPHATE 6.25 MILLIGRAM(S): 80 CAPSULE, EXTENDED RELEASE ORAL at 20:08

## 2019-05-16 RX ADMIN — ATORVASTATIN CALCIUM 20 MILLIGRAM(S): 80 TABLET, FILM COATED ORAL at 20:08

## 2019-05-16 RX ADMIN — PANTOPRAZOLE SODIUM 40 MILLIGRAM(S): 20 TABLET, DELAYED RELEASE ORAL at 08:06

## 2019-05-16 RX ADMIN — METFORMIN HYDROCHLORIDE 1000 MILLIGRAM(S): 850 TABLET ORAL at 20:09

## 2019-05-16 RX ADMIN — LAMOTRIGINE 100 MILLIGRAM(S): 25 TABLET, ORALLY DISINTEGRATING ORAL at 08:06

## 2019-05-16 RX ADMIN — Medication 0.5 MILLIGRAM(S): at 20:09

## 2019-05-16 RX ADMIN — LORATADINE 10 MILLIGRAM(S): 10 TABLET ORAL at 08:06

## 2019-05-16 RX ADMIN — AMLODIPINE BESYLATE 10 MILLIGRAM(S): 2.5 TABLET ORAL at 08:06

## 2019-05-16 RX ADMIN — METFORMIN HYDROCHLORIDE 1000 MILLIGRAM(S): 850 TABLET ORAL at 08:06

## 2019-05-16 RX ADMIN — INSULIN GLARGINE 30 UNIT(S): 100 INJECTION, SOLUTION SUBCUTANEOUS at 20:17

## 2019-05-16 RX ADMIN — Medication 4 MILLIGRAM(S): at 08:06

## 2019-05-16 RX ADMIN — VALACYCLOVIR 500 MILLIGRAM(S): 500 TABLET, FILM COATED ORAL at 08:06

## 2019-05-17 VITALS — TEMPERATURE: 98 F

## 2019-05-17 LAB
GLUCOSE BLDC GLUCOMTR-MCNC: 149 MG/DL — HIGH (ref 70–99)
GLUCOSE BLDC GLUCOMTR-MCNC: 223 MG/DL — HIGH (ref 70–99)

## 2019-05-17 RX ORDER — LOSARTAN POTASSIUM 100 MG/1
1 TABLET, FILM COATED ORAL
Qty: 30 | Refills: 0
Start: 2019-05-17 | End: 2019-06-15

## 2019-05-17 RX ORDER — KETOTIFEN FUMARATE 0.34 MG/ML
1 SOLUTION OPHTHALMIC
Qty: 1 | Refills: 0
Start: 2019-05-17 | End: 2019-06-15

## 2019-05-17 RX ORDER — ENOXAPARIN SODIUM 100 MG/ML
30 INJECTION SUBCUTANEOUS
Qty: 1 | Refills: 0
Start: 2019-05-17 | End: 2019-06-15

## 2019-05-17 RX ORDER — VALACYCLOVIR 500 MG/1
1 TABLET, FILM COATED ORAL
Qty: 30 | Refills: 0
Start: 2019-05-17 | End: 2019-06-15

## 2019-05-17 RX ORDER — ATORVASTATIN CALCIUM 80 MG/1
1 TABLET, FILM COATED ORAL
Qty: 30 | Refills: 0
Start: 2019-05-17 | End: 2019-06-15

## 2019-05-17 RX ORDER — CARVEDILOL PHOSPHATE 80 MG/1
1 CAPSULE, EXTENDED RELEASE ORAL
Qty: 60 | Refills: 0
Start: 2019-05-17 | End: 2019-06-15

## 2019-05-17 RX ORDER — GLIMEPIRIDE 1 MG
1 TABLET ORAL
Qty: 30 | Refills: 0
Start: 2019-05-17 | End: 2019-06-15

## 2019-05-17 RX ORDER — PANTOPRAZOLE SODIUM 20 MG/1
1 TABLET, DELAYED RELEASE ORAL
Qty: 30 | Refills: 0
Start: 2019-05-17 | End: 2019-06-15

## 2019-05-17 RX ORDER — LAMOTRIGINE 25 MG/1
1 TABLET, ORALLY DISINTEGRATING ORAL
Qty: 30 | Refills: 0
Start: 2019-05-17 | End: 2019-06-15

## 2019-05-17 RX ORDER — LORATADINE 10 MG/1
1 TABLET ORAL
Qty: 30 | Refills: 0
Start: 2019-05-17 | End: 2019-06-15

## 2019-05-17 RX ORDER — QUETIAPINE FUMARATE 200 MG/1
1 TABLET, FILM COATED ORAL
Qty: 30 | Refills: 0
Start: 2019-05-17 | End: 2019-06-15

## 2019-05-17 RX ORDER — AMLODIPINE BESYLATE 2.5 MG/1
1 TABLET ORAL
Qty: 30 | Refills: 0
Start: 2019-05-17 | End: 2019-06-15

## 2019-05-17 RX ORDER — CLONAZEPAM 1 MG
1 TABLET ORAL
Qty: 14 | Refills: 0
Start: 2019-05-17 | End: 2019-05-30

## 2019-05-17 RX ORDER — METFORMIN HYDROCHLORIDE 850 MG/1
1 TABLET ORAL
Qty: 60 | Refills: 0
Start: 2019-05-17 | End: 2019-06-15

## 2019-05-17 RX ADMIN — Medication 4: at 08:02

## 2019-05-17 RX ADMIN — Medication 4 MILLIGRAM(S): at 08:44

## 2019-05-17 RX ADMIN — METFORMIN HYDROCHLORIDE 1000 MILLIGRAM(S): 850 TABLET ORAL at 08:44

## 2019-05-17 RX ADMIN — PANTOPRAZOLE SODIUM 40 MILLIGRAM(S): 20 TABLET, DELAYED RELEASE ORAL at 08:44

## 2019-05-17 RX ADMIN — LORATADINE 10 MILLIGRAM(S): 10 TABLET ORAL at 08:44

## 2019-05-17 RX ADMIN — VALACYCLOVIR 500 MILLIGRAM(S): 500 TABLET, FILM COATED ORAL at 08:45

## 2019-05-17 RX ADMIN — LOSARTAN POTASSIUM 100 MILLIGRAM(S): 100 TABLET, FILM COATED ORAL at 08:44

## 2019-05-17 RX ADMIN — LAMOTRIGINE 100 MILLIGRAM(S): 25 TABLET, ORALLY DISINTEGRATING ORAL at 08:44

## 2019-05-17 RX ADMIN — CARVEDILOL PHOSPHATE 6.25 MILLIGRAM(S): 80 CAPSULE, EXTENDED RELEASE ORAL at 08:44

## 2019-05-17 RX ADMIN — AMLODIPINE BESYLATE 10 MILLIGRAM(S): 2.5 TABLET ORAL at 08:44
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
## 2020-03-06 NOTE — ED ADULT TRIAGE NOTE - PAIN: PRESENCE, MLM
Writer called pt with results and recommendations.  Pt verbalized understanding. Pt only takes ibuprofen occasionally.  Pt states tylenol does not work.  Writer advised pt use ibuprofen very sparingly.  Pt verbalized understanding.      Pt asked if Dr. Szymanski was able to get in touch with Dr. Alves about a medication she wanted to try.  Writer told pt that not at this time and the provider is out of the office. Writer will discuss with  and with Dr. Szymanski or writer  will be in touch with her to let her know.     denies pain/discomfort

## 2021-01-26 NOTE — ED PROVIDER NOTE - CPE EDP CARDIAC NORM
Problem: Wound:  Goal: Will show signs of wound healing; wound closure and no evidence of infection  Description: Will show signs of wound healing; wound closure and no evidence of infection  Outcome: Ongoing     Problem: Falls - Risk of:  Goal: Will remain free from falls  Description: Will remain free from falls  Outcome: Ongoing     Problem: Pain:  Goal: Pain level will decrease  Description: Pain level will decrease  Outcome: Ongoing  Goal: Control of acute pain  Description: Control of acute pain  Outcome: Ongoing  Goal: Control of chronic pain  Description: Control of chronic pain  Outcome: Ongoing normal...

## 2021-03-06 ENCOUNTER — EMERGENCY (EMERGENCY)
Facility: HOSPITAL | Age: 61
LOS: 1 days | Discharge: HOME | End: 2021-03-06
Attending: EMERGENCY MEDICINE | Admitting: EMERGENCY MEDICINE
Payer: MEDICAID

## 2021-03-06 VITALS
SYSTOLIC BLOOD PRESSURE: 151 MMHG | HEIGHT: 64 IN | RESPIRATION RATE: 16 BRPM | HEART RATE: 89 BPM | DIASTOLIC BLOOD PRESSURE: 81 MMHG | OXYGEN SATURATION: 97 % | TEMPERATURE: 98 F

## 2021-03-06 DIAGNOSIS — F31.9 BIPOLAR DISORDER, UNSPECIFIED: ICD-10-CM

## 2021-03-06 PROCEDURE — 90792 PSYCH DIAG EVAL W/MED SRVCS: CPT

## 2021-03-06 PROCEDURE — 99284 EMERGENCY DEPT VISIT MOD MDM: CPT

## 2021-03-06 NOTE — ED PROVIDER NOTE - CLINICAL SUMMARY MEDICAL DECISION MAKING FREE TEXT BOX
61F with recent psychiatric admission and hx of bipolar d/o with no meds since dc yest a.m. and stayed up all night, per  refused to come to bed. In ED calm and alert, endorses some manic sx and to ED with EMS. Medically cleared for psych eval., await S.W. for more collateral from .

## 2021-03-06 NOTE — ED ADULT NURSE NOTE - OBJECTIVE STATEMENT
discharged from [facility] yesterday. As per EMS ,  states pt refusing to take her meds. Pt states she did take her meds. Pt states did not sleep at all last night. Pt denies SI/HI. Hx Bipolar,  Patient to . Patient is calm and cooperative but c/o being tired. Pt offered and accepted breakfast. Patient being evaluated by . Waiting for further orders and disposition.   GABRIELA [place]

## 2021-03-06 NOTE — ED BEHAVIORAL HEALTH ASSESSMENT NOTE - CASE SUMMARY
62 y/o  female, , unemployed, domiciled with , hx of bipolar 1 disorder, reported hx of over 20 lifetime inpatient psychiatric hospitalizations (most recent in [place] d/c yesterday 3/5/21; no known hx of violence/substance abuse, as per prior note 1 remote self-aborted suicide attempt, significant fam hx of suicide, and complicated  PMhx of CVA, sleep apnea, HLD, DM, OA, HTN, HSV-2, BIB EMS activated by  for not sleeping and med noncompliance 24 hours after discharged from inpatient.  She presents to the ED after verbal discourse with  in the context of interpersonal conflict, and having insomnia to cook for her birthday. At this time there is no concern for an acute manic episode on exam, as her behavior is not the result of a bipolar decompensation;  and no substance use. At present pt has been calm, cooperative and in good behavioral control in the ED. Pt is not psychotic/ manic; she denies SI/HI and is requesting to be d/c home. Collateral from  verbalized no acute safety concerns. Pt is not presenting as an imminent risk for harm to self or others at this time, and does not meet criteria for involuntary in-patient hospitalization.  Plan: follow [facility] discharge plan, continue home meds, return to ED if symptoms worsen

## 2021-03-06 NOTE — ED PROVIDER NOTE - NSFOLLOWUPINSTRUCTIONS_ED_ALL_ED_FT
You should continue your medications as prescribed.  Follow up with your doctor or psychiatrist.  You can also follow up with [facility] as appointment or walk-in : [phone].  Return for any worsening pain, fever, weakness, numbness, thoughts of hurting yourself or others, or any signs of distress.

## 2021-03-06 NOTE — ED BEHAVIORAL HEALTH NOTE - BEHAVIORAL HEALTH NOTE
Writer called pt’s  [facility] [phone].  He states pt was discharged yesterday from the hospital.  He states when she left after 15 days she was “normal”, then last night she was restless trying to organize the house.  He wanted her to relax.  She cooked then cleaned the kitchen then would get up and start doing some other cleaning in the house.  She cooked a pot of rice to have in the refrigerator.  She had been complaining for several days of body pain, she continued to complain about pain when he asked her where she was having pain she became upset.  He wanted her to lay down and rest, but she would only relax temporarily.  He rubbed her head with alcohol to help in case she had a headache.  She told him to leave her alone and became angry because he kept telling her to lay down and go to sleep.  He states she stayed up all night getting up frequently.  He denies she did anything dangerous.  He denies any drug use.  He states pt fills prescription at [facility] [phone] [address] [place] [address] but states she didn’t receive medication at discharge.    Writer called [facility]  pt is on Trazadone 50mg, Metformin 1000 bid, Lamictal 50 mg bid, Amlodipine 10mg qd, Seroquel 250 qd, Pantoprazole 40mg, Losartan 50 QD, Docusate 100 bid, Plavix QD, Carvedilol 6.25 bid, Atorvastatin 20mg qd, Aripiprazole 20mg qd, Lantis injection 50unit once daily, Abilify 400mg injection every 4 weeks.  Pt has not picked up her medications that were sent to the pharmacy yesterday. Writer called pt’s  [facility] [phone].  He states pt was discharged yesterday from the hospital.  He states when she left after 15 days she was “normal”, then last night she was restless trying to organize the house.  He wanted her to relax.  She cooked then cleaned the kitchen then would get up and start doing some other cleaning in the house.  She cooked a pot of rice to have in the refrigerator.  She had been complaining for several days of body pain, she continued to complain about pain when he asked her where she was having pain she became upset.  He wanted her to lay down and rest, but she would only relax temporarily.  He rubbed her head with alcohol to help in case she had a headache.  She told him to leave her alone and became angry because he kept telling her to lay down and go to sleep.  He states she stayed up all night getting up frequently.  He denies she did anything dangerous.  He denies any drug use.  He states pt fills prescription at [facility] [phone] [facility] [place] [address] but states she didn’t receive medication at discharge.    Writer called [facility]  pt is on Trazadone 50mg, Metformin 1000 bid, Lamictal 50 mg bid, Amlodipine 10mg qd, Seroquel 250 qd, Pantoprazole 40mg, Losartan 50 QD, Docusate 100 bid, Plavix QD, Carvedilol 6.25 bid, Atorvastatin 20mg qd, Aripiprazole 20mg qd, Lantis injection 50unit once daily, Abilify 400mg injection every 4 weeks.  Pt has not picked up her medications that were sent to the pharmacy yesterday.  Pt's  states he is on his way to transport pt home from the ED.
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
## 2021-03-06 NOTE — ED ADULT NURSE NOTE - CHIEF COMPLAINT QUOTE
discharged from [facility] yesterday. As per EMS ,  states pt refusing to take her meds. Pt states she did take her meds. Pt states did not sleep at all last night. Pt denies SI/HI. Hx Bipolar,

## 2021-03-06 NOTE — ED ADULT TRIAGE NOTE - CHIEF COMPLAINT QUOTE
discharged from [facility] yesterday. As per EMS ,  states pt refusing to take her meds. Pt states she did take her meds. Pt states did not sleep at all last night. Pt denies SI/HI. Hx Bipolar,

## 2021-03-06 NOTE — ED BEHAVIORAL HEALTH ASSESSMENT NOTE - NSSUICRSKFACTOR_PSY_ALL_CORE
Current and Past Psychiatric Diagnoses/Historical Factors/Treatment Related Factors/Activating Events/Stressors

## 2021-03-06 NOTE — ED BEHAVIORAL HEALTH ASSESSMENT NOTE - SAFETY PLAN ADDT'L DETAILS
Safety plan discussed with.../Education provided regarding environmental safety / lethal means restriction/Provision of National Suicide Prevention Lifeline 8-471-879-EBTI (3244)
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
## 2021-03-06 NOTE — ED BEHAVIORAL HEALTH ASSESSMENT NOTE - SUMMARY
Pt is a 62 y/o  female, , unemployed, domiciled with , hx of bipolar 1 disorder, reported hx of over 20 lifetime inpatient psychiatric hospitalizations (most recent in [place] d/c yesterday 3/5/21; no known hx of violence/substance abuse, as per prior note 1 remote self-aborted suicide attempt, significant fam hx of suicide, and complicated  PMhx of CVA, sleep apnea, HLD, DM, OA, HTN, HSV-2, BIB EMS activated by  for not sleeping and med non compliance.     Pt present to the ED after verbal discourse with  in the context of interpersonal conflict. At this time there is no concern for an acute manic episode on exam, as her behavior is not the result of a bipolar decompensation; d/c from [place] yesterday, no substance use. At present pt has been calm, cooperative and in good behavioral control in the ED. Pt is not psychotic/ manic; she denies SI/HI and is requesting to be d/c home. Collateral from  verbalized no acute safety concerns. Pt is not presenting as an imminent risk for harm to self or others at this time, and does not meet criteria for involuntary in-patient hospitalization.

## 2021-03-06 NOTE — ED ADULT NURSE NOTE - NSIMPLEMENTINTERV_GEN_ALL_ED
Implemented All Universal Safety Interventions:  [place] to call system. Call bell, personal items and telephone within reach. Instruct patient to call for assistance. Room bathroom lighting operational. Non-slip footwear when patient is off stretcher. Physically safe environment: no spills, clutter or unnecessary equipment. Stretcher in lowest position, wheels locked, appropriate side rails in place.

## 2021-03-06 NOTE — ED BEHAVIORAL HEALTH ASSESSMENT NOTE - CURRENT MEDICATION
As per [facility]  pt is on Trazadone 50mg, Metformin 1000 bid, Lamictal 50 mg bid, Amlodipine 10mg qd, Seroquel 250 qd, Pantoprazole 40mg, Losartan 50 QD, Docusate 100 bid, Plavix QD, Carvedilol 6.25 bid, Atorvastatin 20mg qd, Aripiprazole 20mg qd, Lantis injection 50unit once daily, Abilify 400mg injection every 4 weeks

## 2021-03-06 NOTE — ED BEHAVIORAL HEALTH ASSESSMENT NOTE - RISK ASSESSMENT
Low Acute Suicide Risk Acute risk- low mood, recent admission  she has chronic risk factors that place her at chronic risk including h/o self aborted suicide, bipolar d/o, multiple hospitalizations. However she has multiple protective factors which mitigate her risk including good insight, wants to get better, is future oriented, no SI/HI. She is not at acute risk to self or others and would not benefit from inpatient psychiatric hospitalization. Her needs would best be met with outgoing outpatient care.

## 2021-03-06 NOTE — ED PROVIDER NOTE - PATIENT PORTAL LINK FT
You can access the FollowMyHealth Patient Portal offered by [facility] by registering at the following website: [email or web address]. By joining [facility]’s FollowMyHealth portal, you will also be able to view your health information using other applications (apps) compatible with our system.

## 2021-03-06 NOTE — ED BEHAVIORAL HEALTH NOTE - BEHAVIORAL HEALTH NOTE
[facility] [phone] called to obtain discharged and treatment information, but the nursing staff and  where unable to provide information as the records are purged in the EMR and printed and stored in the  office. No  staff available.    She was discharged on 3/5 from unit  1

## 2021-03-06 NOTE — ED BEHAVIORAL HEALTH ASSESSMENT NOTE - HPI (INCLUDE ILLNESS QUALITY, SEVERITY, DURATION, TIMING, CONTEXT, MODIFYING FACTORS, ASSOCIATED SIGNS AND SYMPTOMS)
Pt is a 62 y/o  female, , unemployed, domiciled with , hx of bipolar 1 disorder, reported hx of over 20 lifetime inpatient psychiatric hospitalizations (most recent in [place] d/ yesterday 3/5/21; no known hx of violence/substance abuse, as per prior note 1 remote self-aborted suicide attempt, significant fam hx of suicide, and complicated  PMhx of CVA, sleep apnea, HLD, DM, OA, HTN, HSV-2, BIB EMS activated by  for not sleeping and med non compliance.     On assessment pt appears drowsy, calm, cooperative, thought process is linear and does not appear acutely manic or psychotic.  Pt reports she came home from the hospital yesterday and found the house “a mess”, “ don't do nothing”.  She reports that she was up all-night cleaning, removing spoiled food from the refrigerator and preparing meal for her upcoming birthday party.  Pt reports that her  was insisting that she go to sleep. She reports that this resulted in a verbal altercation, denies any physical aggression. Pt reports that she took her medications this am (Seroquel, Lamictal, not sure of doses). Pt reports of feeling depress, “always feels depress” denies harboring any current suicidal thoughts, denies HI or violent thoughts towards her . She denies any recent abnormal elevated mood, or energy, decrease need for sleep, grandiosity, racing thoughts or grandiosity. Denies psychotic symptoms including paranoia, illusions or delusions, denies AVH. She is future oriented and is requesting to go home as she feels tired and wants to get some rest.      See  note from  [place] [phone].

## 2021-03-06 NOTE — ED PROVIDER NOTE - PSYCHIATRIC [+], MLM
Quality 474: Zoster Vaccination Status: Shingrix Vaccination Administered or Previously Received Quality 110: Preventive Care And Screening: Influenza Immunization: Influenza Immunization Administered during Influenza season Detail Level: Detailed INSOMNIA

## 2021-03-06 NOTE — ED BEHAVIORAL HEALTH ASSESSMENT NOTE - DESCRIPTION
sleep apnea, HLD, DM, OA, HTN, HSV-2 During course of ED visit patient was calm and cooperative. Patient has not tested limits, has maintained appropriate boundaries, has been easily directed. Patient was not aggressive or violent and did not require PRN medications.   Vital Signs Last 24 Hrs  T(C): 36.8 (06 Mar 2021 09:00), Max: 36.8 (06 Mar 2021 09:00)  T(F): 98.2 (06 Mar 2021 09:00), Max: 98.2 (06 Mar 2021 09:00)  HR: 89 (06 Mar 2021 09:00) (89 - 89)  BP: 151/81 (06 Mar 2021 09:00) (151/81 - 151/81)  BP(mean): --  RR: 16 (06 Mar 2021 09:00) (16 - 16)  SpO2: 97% (06 Mar 2021 09:00) (97% - 97%) Pt is  F, [place] , [place] age 22, currently resides with

## 2021-03-06 NOTE — ED BEHAVIORAL HEALTH ASSESSMENT NOTE - OTHER PAST PSYCHIATRIC HISTORY (INCLUDE DETAILS REGARDING ONSET, COURSE OF ILLNESS, INPATIENT/OUTPATIENT TREATMENT)
Pt has hx of bipolar disorder with multiple admissions > 20 times, pt denies any hx of suicidal behavior or self injurious behavior. Pt was d/c yesterday (3/5/21) from [facility] after a 2 wk stay. She reports of compliance with meds, unable to gather outpatient referral from  and [facility].

## 2021-06-12 NOTE — ED ADULT NURSE NOTE - HEART RATE (BEATS/MIN)
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  Minimal activity in the right parotid.  Some uptake in the right humerus and sternum but he is not symptomatic at these sites.  For now would recommend observation.  We will send his pathology for next generation sequencing to see if there is any role for any maintenance therapy.  We will also review case at tumor conference to see if there would be any role in treating the primary site with radiation therapy.  He does have MRI of the C-spine and follow-up with neurosurgery in a few weeks.    Currently he is having no pain issues.    Continues to smoke and I have asked him to stop completely.    We will monitor and replace electrolytes.    Rash is resolved.    We did do testing for EBV DNA which was positive prior to starting chemotherapy but this is now negative.    Plan: Follow-up in 4 weeks with repeat labs  We will send tumor for next generation sequencing  Review case at tumor conference for consideration of radiation therapy to the primary site  Continue potassium and magnesium supplementation  Follow-up MRI of the C-spine      Measurable disease: PET scan    Current therapy: Zometa every 6 weeks last dose was October 1, 2020  Previously on denosumab      Treatment history:     Cisplatin and gemcitabine, day 1, day 8 q. 21   Cycle 6 September 23 and October 1  Cycle 5,  9/3/2020  Cycle 4, 8/13/2020  cycle 3, July 3 and July 10  Cycle 2 June 8 and Sharon 15  First cycle started May 19, 2020  Denosumab every 4 weeks, first dose May 18, 2020      Palliative radiation, 3000cGY in 10 fractions, 7/23-8/5 between cycle 3 and 4 of chemotherapy    ABVD for 4 cycles, last December 26, 2019  Cycle 3 a delayed by 1 week for a viral syndrome      ECOG Performance   ECOG Performance Status: 1    Distress Assessment  Distress Assessment Score: No distress    Pain         Problem List    1. Lymphoepithelioma (H)  HM1(CBC and Differential)    Comprehensive Metabolic Panel    Magnesium    Pathology Additional Testing        CC: Provider, No Primary Care    ______________________________________________________________________________    History of Present Illness    Mr. Victoriano Schmidt returns for follow-up.  He was seen 3 weeks ago.  He completed cycle 6 of treatment.  Fair tolerance.  Overall is feeling fine.  No headaches or dizziness.  No fever or mouth sores.  No dysphasia or done aphasia.  Appetite and weight are stable.  No shortness of breath or cough.  No new bone or abdominal pain.  No change with bowels or urine.  No bleeding or rash.  ECOG status is 0.  No pain issues.      Pain Status  Currently in Pain: No/denies    Review of Systems    Constitutional  Constitutional (WDL): Exceptions to WDL  Fatigue: Fatigue not relieved by rest - Limiting instrumental ADL  Neurosensory  Neurosensory (WDL): Exceptions to WDL  Peripheral Sensory Neuropathy: Asymptomatic, loss of deep tendon reflexes or paresthesia(Slight numbness to hands and feet)  Cardiovascular  Cardiovascular (WDL): Exceptions to WDL  Edema: Yes(Slight to feet)  Pulmonary  Respiratory (WDL): Exceptions to WDL  Cough: Mild symptoms, nonprescription intervention indicated(Smoker's cough)  Dyspnea: Shortness of breath with moderate exertion  Gastrointestinal  Gastrointestinal (WDL): Exceptions to WDL  Dysgeusia: Altered taste but no  change in diet  Dry Mouth: Symptomatic (e.g., dry or thick saliva) without significant dietary alteration, unstimulated saliva flow >0.2 ml/min  Genitourinary  Genitourinary (WDL): All genitourinary elements are within defined limits  Integumentary  Integumentary (WDL): All integumentary elements are within defined limits  Patient Coping  Patient Coping: Accepting  Distress Assessment  Distress Assessment Score: No distress  Accompanied by  Accompanied by: Alone    Past History  Past Medical History:   Diagnosis Date     Cancer (H)      Cervical radiculopathy      Constipation      Lymphoma (H)      Shortness of breath     with exertion     Spine metastasis (H)          Past Surgical History:   Procedure Laterality Date     CT BIOPSY BONE  5/27/2020     IR PORT PLACEMENT >5 YEARS  9/10/2019     US BIOPSY FINE NEEDLE ASPIRATION LYMPH NODE  7/29/2019     US HEAD NECK THORAX SOFT TISSUE BIOPSY  5/1/2020       Physical Exam    Recent Vitals 10/13/2020   Weight 234 lbs 5 oz   BSA (m2) 2.22 m2   /111   Pulse 114   Temp 99.1   Temp src 1   SpO2 98   Some recent data might be hidden       GENERAL: Alert and oriented to time place and person. Seated comfortably. In no distress.    HEAD: Atraumatic and normocephalic.    EYES: MATT, EOMI.  No pallor.  No icterus.    Oral cavity: no mucosal lesion or tonsillar enlargement.    NECK: supple. JVP normal.  No thyroid enlargement.    LYMPH NODES: No palpable, cervical, axillary or inguinal lymphadenopathy.    Right parotid mass and associated adenopathy has resolved.    CHEST: clear to auscultation bilaterally.  Resonant to percussion throughout bilaterally.  Symmetrical breath movements bilaterally.    CVS: S1 and S2 are heard. Regular rate and rhythm.  No murmur or gallop or rub heard.  No peripheral edema.    ABDOMEN: Soft. Not tender. Not distended.  No palpable hepatomegaly or splenomegaly.  No other mass palpable.  Bowel sounds heard.    EXTREMITIES: Warm.    SKIN: no  rash, or bruising or purpura.  Has a full head of hair.    CNS: Nonfocal.  Normal sensory exam.  Normal power in both extremities.      Lab Results    Recent Results (from the past 168 hour(s))   POCT Glucose    Specimen: Capillary; Blood   Result Value Ref Range    Glucose 101 70 - 139 mg/dL   HM1 (CBC with Diff)   Result Value Ref Range    WBC 5.5 4.0 - 11.0 thou/uL    RBC 3.98 (L) 4.40 - 6.20 mill/uL    Hemoglobin 10.9 (L) 14.0 - 18.0 g/dL    Hematocrit 33.5 (L) 40.0 - 54.0 %    MCV 84 80 - 100 fL    MCH 27.4 27.0 - 34.0 pg    MCHC 32.5 32.0 - 36.0 g/dL    RDW 17.7 (H) 11.0 - 14.5 %    Platelets 131 (L) 140 - 440 thou/uL    MPV 9.8 8.5 - 12.5 fL    Neutrophils % 68 50 - 70 %    Lymphocytes % 11 (L) 20 - 40 %    Monocytes % 19 (H) 2 - 10 %    Eosinophils % 0 0 - 6 %    Basophils % 0 0 - 2 %    Immature Granulocyte % 1 (H) <=0 %    Neutrophils Absolute 3.8 2.0 - 7.7 thou/uL    Lymphocytes Absolute 0.6 (L) 0.8 - 4.4 thou/uL    Monocytes Absolute 1.0 (H) 0.0 - 0.9 thou/uL    Eosinophils Absolute 0.0 0.0 - 0.4 thou/uL    Basophils Absolute 0.0 0.0 - 0.2 thou/uL    Immature Granulocyte Absolute 0.1 (H) <=0.0 thou/uL       Imaging    Nm Pet Ct Skull To Mid Thigh    Result Date: 10/9/2020  EXAM: NM PET CT SKULL TO MID THIGH LOCATION: [facility] DATE/TIME: 10/9/2020 3:18 PM INDICATION: Malignant neoplasm of the parotid gland, right, restaging. Lymphoepithelioma of right parotid gland status post chemotherapy. Hodgkin lymphoma, unspecified, extranodal and solid organ sites. Status post radiation therapy. Interval chemotherapy. Subsequent treatment strategy. COMPARISON: PET/CT 06/25/2020. PET/CT 05/06/2020 reviewed. TECHNIQUE: Serum glucose level 101 mg/dL. One hour post intravenous administration of 10.5 mCi F-18 FDG, PET imaging was performed from the skull base to the mid thighs utilizing attenuation correction with concurrent axial CT and PET/CT image fusion. Dose reduction techniques were used.  FINDINGS: Since 06/25/2020, ill-defined mass in the deep right parotid gland has decreased in size estimated at 1.8 cm, previously 2.2 cm, with decrease in mild uptake (SUVmax 2.7, previously 3.8). Prior right cervical adenopathy has resolved. A new focus of uptake has developed in the proximal right humeral diaphysis (SUVmax 5.5) with underlying density on CT; additionally, two new small foci of uptake have developed in the lower sternum, largest on the right (SUVmax 4.7); findings suggest new bone  metastases. Some additional scattered sclerotic lesions in the skeleton are not FDG avid suggesting sites of treated inactive disease. Left IJ Port-A-Cath with tip near the SVC/RA junction. Few colonic diverticula.     1. Partial favorable treatment response of deep right parotid mass. 2. Complete favorable treatment response of prior cervical adenopathy/kirt metastases. 3. A few new suspected bone metastases have developed in the proximal right humerus and the lower sternum.        Signed by: Charlotte Clements MD   80

## 2021-08-01 PROCEDURE — G9005: CPT

## 2022-07-07 NOTE — ED PROVIDER NOTE - PHYSICAL EXAMINATION
VS:  unremarkable except mild htn    GEN - mild agitation; A+O x3   HEAD - NC/AT     ENT - PEERL, EOMI, mucous membranes  moist , no discharge      NECK: Neck supple, non-tender without lymphadenopathy, no masses, no JVD  PULM - CTA b/l,  symmetric breath sounds  COR -  normal heart sounds    ABD - , ND, NT, soft,  BACK - no CVA tenderness, nontender spine     EXTREMS - no edema, no deformity, warm and well perfused    SKIN - no rash or bruising      NEUROLOGIC - alert, CN 2-12 intact, sensation nl, motor no focal deficit. 1

## 2022-10-12 NOTE — ED ADULT NURSE NOTE - NS PRO AD BILL OF RIGHTS
10/12/22 Janine Medrano PT REQUESTED THAT ALL PATHOLOGY BE SENT TO [facility] FOR PROCESSING.
JESSICA TOMPKINS CHARGE NURSE CALLED [facility] TO HAVE A  SERVICE
PICK THEM UP HERE AND TAKE THEM TO [facility]. Yes

## 2023-07-10 NOTE — ED BEHAVIORAL HEALTH ASSESSMENT NOTE - PATIENT SEEN BY
Complex Repair Preamble Text (Leave Blank If You Do Not Want): Extensive wide undermining was performed. Attending Psychiatrist without NP/Trainee

## 2023-11-06 NOTE — ED ADULT TRIAGE NOTE - TEMPERATURE IN FAHRENHEIT (DEGREES F)
The office order for PCP removal request is Denied and finalized on November 6, 2023. [facility] Attributed PCP is Dr. Vianca Valverde, please contact patient to verify PCP. Inform patient they must contact Dillon Gutierrez to provide the name of current PCP. [facility] Members can change their PCP on the [employer] website, by emailing [email or web address]   [facility] Members can call [employer] customer service at [phone]  [facility] [place] is handled by Dillon Gutierrez, not [employer] should also mail patient a letter to contact office to schedule an appointment or contact Dillon Gutierrez to update their PCP.     After office has verified patient is no longer listed on the [place] [facility] monthly eligibility list and not assigned to Dr. Desire Velazquez then office must resubmit a new order for PCP removal request.      Thanks,  [id number] [email or web address] Team
98.5

## 2024-07-28 NOTE — ED ADULT NURSE NOTE - CHIEF COMPLAINT QUOTE
Pt c/o increasing depression, but states she will "never act on it." Pt intermittently crying in triage. oj/ BELKYS Mitchell

## 2025-02-15 NOTE — ED BEHAVIORAL HEALTH ASSESSMENT NOTE - ORIENTED TO TIME
Pt presents to ED with family member x1, via POV. Pt wheeled to pt's room, ambulates with cane.     Pt reports that she has been coughing x1 week and bilateral ear aches. Pt states she feels like she feels short of breath at times when she is coughing.     Bronchiole scar tissue and ongoing pneumonia in 1995, with left side lobectomy.   Yes

## 2025-07-23 NOTE — ED BEHAVIORAL HEALTH ASSESSMENT NOTE - NS ED BHA PLAN HANDOFF TO INPATIENT PROVIDER YESNO
Group Topic: BH Therapeutic Activity    Date: 7/23/2025  Start Time: 1030  End Time: 1155  Facilitators: Yuan Malagon OT    Focus: Distress Tolerance  Number in attendance: 10    Method: Group  Attendance: Present  Participation: Active  Patient Response: Attentive  Mood: Depressed  Affect: Type: Depressed             Range: Full (normal)             Congruency: Congruent             Stability: Stable  Behavior/Socialization: Appropriate to group  Thought Process: Tracking  Task Performance: Follows directions  Patient Evaluation: Independent - full participation   Yes